# Patient Record
Sex: MALE | Race: WHITE | NOT HISPANIC OR LATINO | Employment: FULL TIME | URBAN - METROPOLITAN AREA
[De-identification: names, ages, dates, MRNs, and addresses within clinical notes are randomized per-mention and may not be internally consistent; named-entity substitution may affect disease eponyms.]

---

## 2017-01-03 RX ORDER — METOPROLOL SUCCINATE 25 MG/1
TABLET, EXTENDED RELEASE ORAL
Qty: 30 TABLET | Refills: 0 | Status: SHIPPED | OUTPATIENT
Start: 2017-01-03 | End: 2017-03-06 | Stop reason: SDUPTHER

## 2017-02-20 DIAGNOSIS — E55.9 VITAMIN D DEFICIENCY: ICD-10-CM

## 2017-02-20 RX ORDER — ERGOCALCIFEROL 1.25 MG/1
CAPSULE ORAL
Qty: 12 CAPSULE | Refills: 0 | OUTPATIENT
Start: 2017-02-20

## 2017-02-20 RX ORDER — FEBUXOSTAT 80 MG/1
TABLET ORAL
Qty: 30 TABLET | Refills: 0 | OUTPATIENT
Start: 2017-02-20

## 2017-02-20 RX ORDER — METOPROLOL SUCCINATE 25 MG/1
TABLET, EXTENDED RELEASE ORAL
Qty: 30 TABLET | Refills: 0 | OUTPATIENT
Start: 2017-02-20

## 2017-03-06 DIAGNOSIS — E78.5 HYPERLIPIDEMIA, UNSPECIFIED HYPERLIPIDEMIA TYPE: ICD-10-CM

## 2017-03-06 RX ORDER — METOPROLOL SUCCINATE 25 MG/1
25 TABLET, EXTENDED RELEASE ORAL DAILY
Qty: 30 TABLET | Refills: 0 | Status: SHIPPED | OUTPATIENT
Start: 2017-03-06 | End: 2017-03-10 | Stop reason: SDUPTHER

## 2017-03-06 RX ORDER — EZETIMIBE AND SIMVASTATIN 10; 80 MG/1; MG/1
1 TABLET ORAL NIGHTLY
Qty: 30 TABLET | Refills: 0 | Status: SHIPPED | OUTPATIENT
Start: 2017-03-06 | End: 2017-03-10 | Stop reason: SDUPTHER

## 2017-03-10 DIAGNOSIS — E55.9 VITAMIN D DEFICIENCY: ICD-10-CM

## 2017-03-10 DIAGNOSIS — E78.5 HYPERLIPIDEMIA, UNSPECIFIED HYPERLIPIDEMIA TYPE: ICD-10-CM

## 2017-03-10 RX ORDER — FEBUXOSTAT 80 MG/1
TABLET ORAL
Qty: 30 TABLET | Refills: 0 | Status: SHIPPED | OUTPATIENT
Start: 2017-03-10 | End: 2017-03-10 | Stop reason: SDUPTHER

## 2017-03-10 RX ORDER — ERGOCALCIFEROL 1.25 MG/1
CAPSULE ORAL
Qty: 12 CAPSULE | Refills: 0 | Status: SHIPPED | OUTPATIENT
Start: 2017-03-10 | End: 2017-06-07 | Stop reason: SDUPTHER

## 2017-03-10 RX ORDER — METOPROLOL SUCCINATE 25 MG/1
25 TABLET, EXTENDED RELEASE ORAL DAILY
Qty: 30 TABLET | Refills: 0 | Status: SHIPPED | OUTPATIENT
Start: 2017-03-10 | End: 2017-06-07 | Stop reason: SDUPTHER

## 2017-03-10 RX ORDER — CHOLECALCIFEROL (VITAMIN D3) 1250 MCG
CAPSULE ORAL
Qty: 12 CAPSULE | Refills: 0 | Status: SHIPPED | OUTPATIENT
Start: 2017-03-10 | End: 2019-02-17 | Stop reason: SDUPTHER

## 2017-03-10 RX ORDER — FEBUXOSTAT 80 MG/1
80 TABLET, FILM COATED ORAL DAILY
Qty: 30 TABLET | Refills: 0 | Status: SHIPPED | OUTPATIENT
Start: 2017-03-10 | End: 2017-04-20 | Stop reason: SDUPTHER

## 2017-03-10 RX ORDER — EZETIMIBE AND SIMVASTATIN 10; 80 MG/1; MG/1
1 TABLET ORAL NIGHTLY
Qty: 30 TABLET | Refills: 0 | Status: SHIPPED | OUTPATIENT
Start: 2017-03-10 | End: 2017-03-13

## 2017-03-13 ENCOUNTER — TELEPHONE (OUTPATIENT)
Dept: INTERNAL MEDICINE | Facility: CLINIC | Age: 47
End: 2017-03-13

## 2017-03-13 RX ORDER — SILDENAFIL CITRATE 100 MG
TABLET ORAL
Qty: 10 TABLET | Refills: 3 | OUTPATIENT
Start: 2017-03-13 | End: 2019-01-08 | Stop reason: SDUPTHER

## 2017-03-13 RX ORDER — EZETIMIBE AND SIMVASTATIN 10; 40 MG/1; MG/1
1 TABLET ORAL NIGHTLY
Qty: 90 TABLET | Refills: 1 | Status: SHIPPED | OUTPATIENT
Start: 2017-03-13 | End: 2017-06-08 | Stop reason: SDUPTHER

## 2017-03-13 NOTE — TELEPHONE ENCOUNTER
Pt insurance will not cover the Vytorin 10/80 1/2 tablet qd.  Pt only takes vytorin 10/40 anyway.  He has to pay whatever his part is of the medicine but it does not need PA

## 2017-03-14 ENCOUNTER — CONVERSION ENCOUNTER (OUTPATIENT)
Dept: INTERNAL MEDICINE | Facility: CLINIC | Age: 47
End: 2017-03-14

## 2017-03-17 LAB
CONV COMMENT: ABNORMAL
TESTOST SERPL-MCNC: 347 NG/DL (ref 348–1197)
TESTOSTERONE.FREE+WB MFR SERPL: 22.2 % (ref 9–46)
TESTOSTERONE.FREE+WB SERPL-MCNC: 77 NG/DL (ref 40–250)

## 2017-03-21 ENCOUNTER — OFFICE VISIT (OUTPATIENT)
Dept: INTERNAL MEDICINE | Facility: CLINIC | Age: 47
End: 2017-03-21

## 2017-03-21 VITALS
WEIGHT: 299 LBS | HEART RATE: 98 BPM | BODY MASS INDEX: 39.63 KG/M2 | SYSTOLIC BLOOD PRESSURE: 112 MMHG | DIASTOLIC BLOOD PRESSURE: 74 MMHG | HEIGHT: 73 IN | OXYGEN SATURATION: 96 %

## 2017-03-21 DIAGNOSIS — Z51.81 THERAPEUTIC DRUG MONITORING: ICD-10-CM

## 2017-03-21 DIAGNOSIS — G47.33 OBSTRUCTIVE SLEEP APNEA: Chronic | ICD-10-CM

## 2017-03-21 DIAGNOSIS — E55.9 VITAMIN D DEFICIENCY: Chronic | ICD-10-CM

## 2017-03-21 DIAGNOSIS — E79.0 ASYMPTOMATIC HYPERURICEMIA: Chronic | ICD-10-CM

## 2017-03-21 DIAGNOSIS — I10 BENIGN ESSENTIAL HYPERTENSION: Chronic | ICD-10-CM

## 2017-03-21 DIAGNOSIS — E29.1 HYPOGONADISM MALE: Primary | Chronic | ICD-10-CM

## 2017-03-21 DIAGNOSIS — R73.01 IMPAIRED FASTING GLUCOSE: Chronic | ICD-10-CM

## 2017-03-21 DIAGNOSIS — E78.5 HYPERLIPIDEMIA, UNSPECIFIED HYPERLIPIDEMIA TYPE: Chronic | ICD-10-CM

## 2017-03-21 DIAGNOSIS — Z23 NEED FOR INFLUENZA VACCINATION: ICD-10-CM

## 2017-03-21 DIAGNOSIS — Z00.00 ROUTINE PHYSICAL EXAMINATION: ICD-10-CM

## 2017-03-21 DIAGNOSIS — N52.9 MALE ERECTILE DISORDER: Chronic | ICD-10-CM

## 2017-03-21 DIAGNOSIS — Z23 NEED FOR TDAP VACCINATION: ICD-10-CM

## 2017-03-21 PROCEDURE — 99213 OFFICE O/P EST LOW 20 MIN: CPT | Performed by: INTERNAL MEDICINE

## 2017-03-21 PROCEDURE — 90656 IIV3 VACC NO PRSV 0.5 ML IM: CPT | Performed by: INTERNAL MEDICINE

## 2017-03-21 PROCEDURE — 90471 IMMUNIZATION ADMIN: CPT | Performed by: INTERNAL MEDICINE

## 2017-03-21 PROCEDURE — 90472 IMMUNIZATION ADMIN EACH ADD: CPT | Performed by: INTERNAL MEDICINE

## 2017-03-21 PROCEDURE — 90715 TDAP VACCINE 7 YRS/> IM: CPT | Performed by: INTERNAL MEDICINE

## 2017-03-21 NOTE — PROGRESS NOTES
03/21/2017    Patient Information  Victoriano Rowe                                                                                          [email or web address]  [facility] [address]      1970  [phone]    Chief Complaint:     Follow-up questionable hypogonadism, erectile dysfunction.  No new acute complaints.    History of Present Illness:    Patient with a history of allergic rhinitis/environmental allergies, hyperuricemia, hypertension, hyperlipidemia, hypogonadism, impaired fasting glucose, male erectile disorder, sleep apnea, history of nephrolithiasis.  He presents today to follow-up questionable hypogonadism and the history regarding this will be described in detail below.  Patient is currently feeling well and has no new acute complaints.  Past medical history extensively reviewed including health maintenance parameters and this reveals that patient needs his influenza vaccination and also needs a TDaP.    Review of Systems   Constitution: Negative.   HENT: Negative.    Eyes: Negative.    Cardiovascular: Negative.    Respiratory: Negative.    Endocrine: Negative.    Hematologic/Lymphatic: Negative.    Skin: Negative.    Musculoskeletal: Negative.    Gastrointestinal: Negative.    Genitourinary: Negative.    Neurological: Negative.    Psychiatric/Behavioral: Negative.    Allergic/Immunologic: Negative.        Active Problems:    Patient Active Problem List   Diagnosis   • Allergic rhinitis   • Asymptomatic hyperuricemia   • Benign essential hypertension   • Chronic lower back pain   • Hyperlipidemia   • Borderline Hypogonadism male   • Impaired fasting glucose   • Male erectile disorder   • Morbid obesity   • Obstructive sleep apnea, 2008--tolerates CPAP well.   • Vitamin D deficiency   • History of nephrolithiasis   • Therapeutic drug monitoring   • Routine physical examination   • Multiple environmental allergies         Past Medical History   Diagnosis Date   • History  of cardiovascular stress test 02/15/2014     02/15/2014--Patient subsequently discontinued caffeine from his diet and all of his symptoms have resolved. 07/27/2012--patient was seen in evaluation by the cardiologist. He was complaining of some dizziness and his metoprolol was decreased from 25 mg per day to 12.5 mg per day which resulted in improvement of his symptoms. He was continuing to experience chronic chest discomfort as well as palp   • History of chest pain 10/03/2014     10/03/2014--Patient subsequently discontinued caffeine from his diet and all of his symptoms have resolved.   07/27/2012--patient was seen in evaluation by the cardiologist. He was complaining of some dizziness and his metoprolol was decreased from 25 mg per day to 12.5 mg per day which resulted in improvement of his symptoms. He was continuing to experience chronic chest discomfort as well as pa   • History of echocardiogram 10/01/2008     10/01/2008--echocardiogram reveals left ventricular chamber dimensions normal with mild concentric hypertrophy. Wall motion appears normal. Left atrium mildly dilated. Right ventricle not well seen but appears to contract normally. Right atrium is normal size by visual estimate. The mitral valve leaflets are mobile. The inflow pattern suggests abnormal relaxation. No significant regurgitation appr   • History of gastroesophageal reflux (GERD) 12/28/2011 12/28/2011--treatment for gastroesophageal reflux begun with Nexium. This was later discontinued and patient remains asymptomatic.   • History of Holter monitoring 07/27/2012 07/27/2012--Holter monitor revealed 10 isolated PVCs without couplets or episodes of ventricular tachycardia. There were rare PACs. No heart block. Heart palpitations were noted by the patient on 3 occasions. No arrhythmia was noted at any of these times. There were no ST segment changes.   03/04/2012--Holter monitor revealed rare ventricular ectopy without evidence of  ventricular tachycardia. Rar   • History of nephrolithiasis 7/19/2011 08/25/2011-- allopurinol initiated for hyperuricemia.  07/19/2011--patient presented with right flank pain with radiation into the right back. CT scan of the abdomen revealed a 4 mm stone in the dependent aspect of the urinary bladder.  Likely recent passage from the right ureter where there is mild right hydronephrosis.  1.6 cm nonobstructing stone at the lower pole of the left kidney and a punctate nonobstructing stone at the upper pole of the left kidney.  Patient passed the stone spontaneously.  Calcium oxalate.   • History of orthostatic hypotension 1/25/2016 03/16/2016--patient seen in follow-up and reports his orthostatic symptoms have totally resolved off of Benicar.  Blood pressure is excellent at 120/80.  02/16/2016--patient seen in follow-up and feels much better.  Blood pressure is still somewhat low at 104/64.  I personally checked his blood pressure and it returned 110/69.  Benicar discontinued altogether.  We will give it about one month to wash out and then reassess.  01/25/2016--patient has lost 60 pounds over the past year and he reports that he gets lightheaded when he stands up from time to time.  Indeed his blood pressure is low at 100/60.  Benicar HCT 40/25 reduced to a half a pill per day.  Reassess in 3 weeks.   • History of palpitations 02/15/2014     02/15/2014--Patient subsequently discontinued caffeine from his diet and all of his symptoms have resolved.   07/27/2012--patient was seen in evaluation by the cardiologist. He was complaining of some dizziness and his metoprolol was decreased from 25 mg per day to 12.5 mg per day which resulted in improvement of his symptoms. He was continuing to experience chronic chest discomfort as well as pa         Past Surgical History   Procedure Laterality Date   • Kidney surgery Left      Age [age].  Patient had some sort of swelling of his left kidney, possibly a cyst.   Surgical resection.  No further problems.         Allergies   Allergen Reactions   • Penicillins            Current Outpatient Prescriptions:   •  aspirin 81 MG tablet, Take 1 tablet by mouth daily., Disp: , Rfl:   •  Cholecalciferol (VITAMIN D3) 82034 UNITS capsule, Take 1 by mouth 3 times a week on Monday Wednesday and Friday., Disp: 12 capsule, Rfl: 0  •  ezetimibe-simvastatin (VYTORIN) 10-40 MG per tablet, Take 1 tablet by mouth Every Night., Disp: 90 tablet, Rfl: 1  •  febuxostat (ULORIC) 80 MG tablet tablet, Take 1 tablet by mouth Daily., Disp: 30 tablet, Rfl: 0  •  metoprolol succinate XL (TOPROL-XL) 25 MG 24 hr tablet, Take 1 tablet by mouth Daily., Disp: 30 tablet, Rfl: 0  •  Multiple Vitamins tablet, Take 1 tablet by mouth daily., Disp: , Rfl:   •  sildenafil (VIAGRA) 100 MG tablet, Take 1 tablet by mouth as needed. TAKE APPROXIMATELY ONE HOUR BEFORE NEEDED, Disp: , Rfl:   •  sildenafil (VIAGRA) 100 MG tablet, Take 1 tablet (100 mg total) by mouth daily as needed for erectile dysfunction, Disp: 40 tablet, Rfl: 3  •  tadalafil (CIALIS) 5 MG tablet, Take 1 tablet (5 mg total) by mouth daily as needed for erectile dysfunction, Disp: 30 tablet, Rfl: 1  •  VIAGRA 100 MG tablet, TAKE ONE TABLET BY MOUTH APPROXIMATELY ONE HOUR BEFORE NEEDED, Disp: 10 tablet, Rfl: 3  •  vitamin D (ERGOCALCIFEROL) 82072 UNITS capsule capsule, TAKE ONE CAPSULE BY MOUTH THREE TIMES A WEEK ON MONDAY, WEDNESDAY AND FRIDAY, Disp: 12 capsule, Rfl: 0      Family History   Problem Relation Age of Onset   • Alcohol abuse Father    • Heart failure Father    • Emphysema Father    • Diabetes Father    • Diabetes Sister          Social History     Social History   • Marital status:      Spouse name: N/A   • Number of children: N/A   • Years of education: N/A     Occupational History   •  for an [employer]      Social History Main Topics   • Smoking status: Never Smoker   • Smokeless tobacco: Never Used   • Alcohol use Yes       "Comment: Social alcohol consumption   • Drug use: No   • Sexual activity: Yes     Partners: Female     Other Topics Concern   • Not on file     Social History Narrative         Vitals:    03/21/17 1440   BP: 112/74   Pulse: 98   SpO2: 96%   Weight: 299 lb (136 kg)   Height: 73\" (185.4 cm)          Physical Exam:    General: Alert and oriented x 3.  No acute distress. Obese. Normal affect.  HEENT: Pupils equal, round, reactive to light; extraocular movements intact; sclerae nonicteric; pharynx, ear canals and TMs normal.  Neck: Without JVD, thyromegaly, bruit, or adenopathy.  Lungs: Clear to auscultation in all fields.  Heart: Regular rate and rhythm without murmur, rub, gallop, or click.  Abdomen: Soft, nontender, without hepatosplenomegaly or hernia.  Bowel sounds normal.  : Deferred.  Rectal: Deferred.  Extremities: Without clubbing, cyanosis, edema, or pulse deficit.  Neurologic: Intact without focal deficit.  Normal station and gait observed during ingress and egress from the examination room.  Skin: Without significant lesion.  Musculoskeletal: Unremarkable.      Lab/other results:    Total testosterone is borderline low at 347.  Free and weakly bound testosterone normal at 77.0.    Assessment/Plan:     Diagnosis Plan   1. Borderline Hypogonadism male     2. Benign essential hypertension     3. Hyperlipidemia, unspecified hyperlipidemia type     4. Impaired fasting glucose     5. Male erectile disorder     6. Obstructive sleep apnea, 2008--tolerates CPAP well.     7. Vitamin D deficiency     8. Therapeutic drug monitoring     9. Asymptomatic hyperuricemia     10. Routine physical examination         Patient has borderline hypogonadism and TRT is not indicated.  He has hypertension and it appears to be in good control.  Also note that several diagnoses were added to the assessment and plan that were not formally evaluated today.  This was done to facilitate ordering his lab work for his physical in the near " future.    Plan is as follows: No change in current medical regimen.  TDaP and influenza vaccinations given today.  Patient will follow-up after 06/30/2017 for his annual exam with lab prior.          Procedures

## 2017-03-22 ENCOUNTER — RESULTS ENCOUNTER (OUTPATIENT)
Dept: INTERNAL MEDICINE | Facility: CLINIC | Age: 47
End: 2017-03-22

## 2017-03-22 DIAGNOSIS — E78.5 HYPERLIPIDEMIA, UNSPECIFIED HYPERLIPIDEMIA TYPE: Chronic | ICD-10-CM

## 2017-03-22 DIAGNOSIS — R73.01 IMPAIRED FASTING GLUCOSE: Chronic | ICD-10-CM

## 2017-03-22 DIAGNOSIS — E55.9 VITAMIN D DEFICIENCY: Chronic | ICD-10-CM

## 2017-03-22 DIAGNOSIS — Z00.00 ROUTINE PHYSICAL EXAMINATION: ICD-10-CM

## 2017-03-22 DIAGNOSIS — E79.0 ASYMPTOMATIC HYPERURICEMIA: Chronic | ICD-10-CM

## 2017-03-22 DIAGNOSIS — E29.1 HYPOGONADISM MALE: Chronic | ICD-10-CM

## 2017-04-20 RX ORDER — FEBUXOSTAT 80 MG/1
TABLET ORAL
Qty: 30 TABLET | Refills: 1 | Status: SHIPPED | OUTPATIENT
Start: 2017-04-20 | End: 2017-07-05 | Stop reason: SDUPTHER

## 2017-06-07 DIAGNOSIS — E55.9 VITAMIN D DEFICIENCY: ICD-10-CM

## 2017-06-07 RX ORDER — METOPROLOL SUCCINATE 25 MG/1
TABLET, EXTENDED RELEASE ORAL
Qty: 30 TABLET | Refills: 0 | Status: SHIPPED | OUTPATIENT
Start: 2017-06-07 | End: 2017-07-05 | Stop reason: SDUPTHER

## 2017-06-07 RX ORDER — ERGOCALCIFEROL 1.25 MG/1
CAPSULE ORAL
Qty: 12 CAPSULE | Refills: 0 | Status: SHIPPED | OUTPATIENT
Start: 2017-06-07 | End: 2017-07-18 | Stop reason: SDUPTHER

## 2017-06-08 RX ORDER — EZETIMIBE AND SIMVASTATIN 10; 40 MG/1; MG/1
1 TABLET ORAL NIGHTLY
Qty: 90 TABLET | Refills: 3 | Status: SHIPPED | OUTPATIENT
Start: 2017-06-08 | End: 2018-02-01 | Stop reason: SDUPTHER

## 2017-07-05 DIAGNOSIS — E55.9 VITAMIN D DEFICIENCY: ICD-10-CM

## 2017-07-05 RX ORDER — METOPROLOL SUCCINATE 25 MG/1
TABLET, EXTENDED RELEASE ORAL
Qty: 30 TABLET | Refills: 0 | Status: SHIPPED | OUTPATIENT
Start: 2017-07-05 | End: 2017-08-10 | Stop reason: SDUPTHER

## 2017-07-05 RX ORDER — FEBUXOSTAT 80 MG/1
TABLET ORAL
Qty: 30 TABLET | Refills: 0 | Status: SHIPPED | OUTPATIENT
Start: 2017-07-05 | End: 2017-08-10 | Stop reason: SDUPTHER

## 2017-07-06 RX ORDER — ERGOCALCIFEROL 1.25 MG/1
CAPSULE ORAL
Qty: 12 CAPSULE | Refills: 0 | OUTPATIENT
Start: 2017-07-06

## 2017-07-18 DIAGNOSIS — E55.9 VITAMIN D DEFICIENCY: ICD-10-CM

## 2017-07-19 RX ORDER — ERGOCALCIFEROL 1.25 MG/1
CAPSULE ORAL
Qty: 12 CAPSULE | Refills: 0 | Status: SHIPPED | OUTPATIENT
Start: 2017-07-19 | End: 2017-08-10 | Stop reason: SDUPTHER

## 2017-07-25 LAB
25(OH)D3+25(OH)D2 SERPL-MCNC: 39.5 NG/ML (ref 30–100)
ALBUMIN SERPL-MCNC: 4.6 G/DL (ref 3.5–5.2)
ALBUMIN/GLOB SERPL: 1.5 G/DL
ALP SERPL-CCNC: 58 U/L (ref 39–117)
ALT SERPL-CCNC: 97 U/L (ref 1–41)
APPEARANCE UR: CLEAR
AST SERPL-CCNC: 52 U/L (ref 1–40)
BILIRUB SERPL-MCNC: 0.5 MG/DL (ref 0.1–1.2)
BILIRUB UR QL STRIP: NEGATIVE
BUN SERPL-MCNC: 15 MG/DL (ref 6–20)
BUN/CREAT SERPL: 16.1 (ref 7–25)
CALCIUM SERPL-MCNC: 9.7 MG/DL (ref 8.6–10.5)
CHLORIDE SERPL-SCNC: 104 MMOL/L (ref 98–107)
CHOLEST SERPL-MCNC: 92 MG/DL (ref 100–199)
CK SERPL-CCNC: 125 U/L (ref 20–200)
CO2 SERPL-SCNC: 27.5 MMOL/L (ref 22–29)
COLOR UR: YELLOW
CREAT SERPL-MCNC: 0.93 MG/DL (ref 0.76–1.27)
ERYTHROCYTE [DISTWIDTH] IN BLOOD BY AUTOMATED COUNT: 13.8 % (ref 11.5–14.5)
GLOBULIN SER CALC-MCNC: 3.1 GM/DL
GLUCOSE SERPL-MCNC: 100 MG/DL (ref 65–99)
GLUCOSE UR QL: NEGATIVE
HBA1C MFR BLD: 5.2 % (ref 4.8–5.6)
HCT VFR BLD AUTO: 45.7 % (ref 40.4–52.2)
HDL SERPL-SCNC: 33.7 UMOL/L
HDLC SERPL-MCNC: 36 MG/DL
HGB BLD-MCNC: 14.4 G/DL (ref 13.7–17.6)
HGB UR QL STRIP: NEGATIVE
KETONES UR QL STRIP: NEGATIVE
LDL SERPL QN: 19.7 NM
LDL SERPL-SCNC: 464 NMOL/L
LDL SMALL SERPL-SCNC: 364 NMOL/L
LDLC SERPL CALC-MCNC: 30 MG/DL (ref 0–99)
LEUKOCYTE ESTERASE UR QL STRIP: NEGATIVE
MCH RBC QN AUTO: 29.1 PG (ref 27–32.7)
MCHC RBC AUTO-ENTMCNC: 31.5 G/DL (ref 32.6–36.4)
MCV RBC AUTO: 92.3 FL (ref 79.8–96.2)
NITRITE UR QL STRIP: NEGATIVE
PH UR STRIP: 5.5 [PH] (ref 5–8)
PLATELET # BLD AUTO: 168 10*3/MM3 (ref 140–500)
POTASSIUM SERPL-SCNC: 4.8 MMOL/L (ref 3.5–5.2)
PROT SERPL-MCNC: 7.7 G/DL (ref 6–8.5)
PROT UR QL STRIP: ABNORMAL
PSA SERPL-MCNC: 0.42 NG/ML (ref 0–4)
RBC # BLD AUTO: 4.95 10*6/MM3 (ref 4.6–6)
SODIUM SERPL-SCNC: 144 MMOL/L (ref 136–145)
SP GR UR: 1.02 (ref 1–1.03)
T3FREE SERPL-MCNC: 3.2 PG/ML (ref 2–4.4)
T4 FREE SERPL-MCNC: 1.26 NG/DL (ref 0.93–1.7)
TESTOST SERPL-MCNC: 292 NG/DL (ref 264–916)
TESTOSTERONE.FREE+WB MFR SERPL: 26.9 % (ref 9–46)
TESTOSTERONE.FREE+WB SERPL-MCNC: 78.5 NG/DL (ref 40–250)
TRIGL SERPL-MCNC: 128 MG/DL (ref 0–149)
TSH SERPL DL<=0.005 MIU/L-ACNC: 3.96 MIU/ML (ref 0.27–4.2)
URATE SERPL-MCNC: 4.6 MG/DL (ref 3.4–7)
UROBILINOGEN UR STRIP-MCNC: ABNORMAL MG/DL
WBC # BLD AUTO: 5.1 10*3/MM3 (ref 4.5–10.7)

## 2017-07-27 ENCOUNTER — OFFICE VISIT (OUTPATIENT)
Dept: INTERNAL MEDICINE | Facility: CLINIC | Age: 47
End: 2017-07-27

## 2017-07-27 VITALS
SYSTOLIC BLOOD PRESSURE: 116 MMHG | BODY MASS INDEX: 41.07 KG/M2 | OXYGEN SATURATION: 93 % | HEIGHT: 73 IN | DIASTOLIC BLOOD PRESSURE: 64 MMHG | HEART RATE: 108 BPM | WEIGHT: 309.9 LBS

## 2017-07-27 DIAGNOSIS — E79.0 ASYMPTOMATIC HYPERURICEMIA: Chronic | ICD-10-CM

## 2017-07-27 DIAGNOSIS — E29.1 HYPOGONADISM MALE: Chronic | ICD-10-CM

## 2017-07-27 DIAGNOSIS — M54.5 CHRONIC LOW BACK PAIN, UNSPECIFIED BACK PAIN LATERALITY, WITH SCIATICA PRESENCE UNSPECIFIED: Chronic | ICD-10-CM

## 2017-07-27 DIAGNOSIS — I10 BENIGN ESSENTIAL HYPERTENSION: Chronic | ICD-10-CM

## 2017-07-27 DIAGNOSIS — E55.9 VITAMIN D DEFICIENCY: Chronic | ICD-10-CM

## 2017-07-27 DIAGNOSIS — G47.33 OBSTRUCTIVE SLEEP APNEA: Chronic | ICD-10-CM

## 2017-07-27 DIAGNOSIS — Z51.81 THERAPEUTIC DRUG MONITORING: ICD-10-CM

## 2017-07-27 DIAGNOSIS — R73.01 IMPAIRED FASTING GLUCOSE: Chronic | ICD-10-CM

## 2017-07-27 DIAGNOSIS — E78.5 HYPERLIPIDEMIA, UNSPECIFIED HYPERLIPIDEMIA TYPE: Chronic | ICD-10-CM

## 2017-07-27 DIAGNOSIS — R74.8 ELEVATED LIVER ENZYMES: ICD-10-CM

## 2017-07-27 DIAGNOSIS — Z87.442 HISTORY OF NEPHROLITHIASIS: Chronic | ICD-10-CM

## 2017-07-27 DIAGNOSIS — G89.29 CHRONIC LOW BACK PAIN, UNSPECIFIED BACK PAIN LATERALITY, WITH SCIATICA PRESENCE UNSPECIFIED: Chronic | ICD-10-CM

## 2017-07-27 DIAGNOSIS — Z00.00 ROUTINE PHYSICAL EXAMINATION: Primary | ICD-10-CM

## 2017-07-27 DIAGNOSIS — Z91.09 MULTIPLE ENVIRONMENTAL ALLERGIES: Chronic | ICD-10-CM

## 2017-07-27 PROCEDURE — 99396 PREV VISIT EST AGE 40-64: CPT | Performed by: INTERNAL MEDICINE

## 2017-07-27 PROCEDURE — 99213 OFFICE O/P EST LOW 20 MIN: CPT | Performed by: INTERNAL MEDICINE

## 2017-07-27 NOTE — PROGRESS NOTES
07/27/2017    Patient Information  Victoriano Rowe                                                                                          [id number]  [facility] [address]      1970  [phone]    Chief Complaint:     Routine physical examination and follow-up lab work.  No new acute complaints.    History of Present Illness:    Patient with a history of impaired fasting glucose, hyperlipidemia, borderline hypogonadism, hypertension, asymptomatic hyperuricemia and history of kidney stones, sleep apnea, environmental allergies, vitamin D deficiency, chronic lower back pain.  He presents today for a routine physical with lab prior in order to monitor his chronic medical issues.  He is tolerating his medications well and has no new acute complaints.  Past medical history reviewed and updated where necessary including health maintenance parameters.  This reveals he is up-to-date.    Review of Systems   Constitution: Negative.   HENT: Negative.    Eyes: Negative.    Cardiovascular: Negative.    Respiratory: Negative.    Endocrine: Negative.    Hematologic/Lymphatic: Negative.    Skin: Negative.    Musculoskeletal: Negative.    Gastrointestinal: Negative.    Genitourinary: Negative.    Neurological: Negative.    Psychiatric/Behavioral: Negative.    Allergic/Immunologic: Negative.        Active Problems:    Patient Active Problem List   Diagnosis   • Allergic rhinitis   • Asymptomatic hyperuricemia   • Benign essential hypertension   • Chronic lower back pain   • Hyperlipidemia   • Borderline Hypogonadism male   • Impaired fasting glucose   • Male erectile disorder   • Morbid obesity   • Obstructive sleep apnea, 2008--tolerates CPAP well.   • Vitamin D deficiency   • History of nephrolithiasis   • Therapeutic drug monitoring   • Routine physical examination   • Multiple environmental allergies   • Elevated liver enzymes         Past Medical History:   Diagnosis Date   • History  of cardiovascular stress test 02/15/2014    02/15/2014--Patient subsequently discontinued caffeine from his diet and all of his symptoms have resolved. 07/27/2012--patient was seen in evaluation by the cardiologist. He was complaining of some dizziness and his metoprolol was decreased from 25 mg per day to 12.5 mg per day which resulted in improvement of his symptoms. He was continuing to experience chronic chest discomfort as well as palp   • History of chest pain 10/03/2014    10/03/2014--Patient subsequently discontinued caffeine from his diet and all of his symptoms have resolved.   07/27/2012--patient was seen in evaluation by the cardiologist. He was complaining of some dizziness and his metoprolol was decreased from 25 mg per day to 12.5 mg per day which resulted in improvement of his symptoms. He was continuing to experience chronic chest discomfort as well as pa   • History of echocardiogram 10/01/2008    10/01/2008--echocardiogram reveals left ventricular chamber dimensions normal with mild concentric hypertrophy. Wall motion appears normal. Left atrium mildly dilated. Right ventricle not well seen but appears to contract normally. Right atrium is normal size by visual estimate. The mitral valve leaflets are mobile. The inflow pattern suggests abnormal relaxation. No significant regurgitation appr   • History of gastroesophageal reflux (GERD) 12/28/2011 12/28/2011--treatment for gastroesophageal reflux begun with Nexium. This was later discontinued and patient remains asymptomatic.   • History of Holter monitoring 07/27/2012 07/27/2012--Holter monitor revealed 10 isolated PVCs without couplets or episodes of ventricular tachycardia. There were rare PACs. No heart block. Heart palpitations were noted by the patient on 3 occasions. No arrhythmia was noted at any of these times. There were no ST segment changes.   03/04/2012--Holter monitor revealed rare ventricular ectopy without evidence of  ventricular tachycardia. Rar   • History of nephrolithiasis 7/19/2011 08/25/2011-- allopurinol initiated for hyperuricemia.  07/19/2011--patient presented with right flank pain with radiation into the right back. CT scan of the abdomen revealed a 4 mm stone in the dependent aspect of the urinary bladder.  Likely recent passage from the right ureter where there is mild right hydronephrosis.  1.6 cm nonobstructing stone at the lower pole of the left kidney and a punctate nonobstructing stone at the upper pole of the left kidney.  Patient passed the stone spontaneously.  Calcium oxalate.   • History of orthostatic hypotension 1/25/2016 03/16/2016--patient seen in follow-up and reports his orthostatic symptoms have totally resolved off of Benicar.  Blood pressure is excellent at 120/80.  02/16/2016--patient seen in follow-up and feels much better.  Blood pressure is still somewhat low at 104/64.  I personally checked his blood pressure and it returned 110/69.  Benicar discontinued altogether.  We will give it about one month to wash out and then reassess.  01/25/2016--patient has lost 60 pounds over the past year and he reports that he gets lightheaded when he stands up from time to time.  Indeed his blood pressure is low at 100/60.  Benicar HCT 40/25 reduced to a half a pill per day.  Reassess in 3 weeks.   • History of palpitations 02/15/2014    02/15/2014--Patient subsequently discontinued caffeine from his diet and all of his symptoms have resolved.   07/27/2012--patient was seen in evaluation by the cardiologist. He was complaining of some dizziness and his metoprolol was decreased from 25 mg per day to 12.5 mg per day which resulted in improvement of his symptoms. He was continuing to experience chronic chest discomfort as well as pa         Past Surgical History:   Procedure Laterality Date   • KIDNEY SURGERY Left     Age [age].  Patient had some sort of swelling of his left kidney, possibly a cyst.   Surgical resection.  No further problems.         Allergies   Allergen Reactions   • Penicillins            Current Outpatient Prescriptions:   •  aspirin 81 MG tablet, Take 1 tablet by mouth daily., Disp: , Rfl:   •  Cholecalciferol (VITAMIN D3) 66552 UNITS capsule, Take 1 by mouth 3 times a week on Monday Wednesday and Friday., Disp: 12 capsule, Rfl: 0  •  ezetimibe-simvastatin (VYTORIN) 10-40 MG per tablet, Take 1 tablet by mouth Every Night., Disp: 90 tablet, Rfl: 3  •  metoprolol succinate XL (TOPROL-XL) 25 MG 24 hr tablet, TAKE ONE TABLET BY MOUTH ONCE DAILY, Disp: 30 tablet, Rfl: 0  •  Multiple Vitamins tablet, Take 1 tablet by mouth daily., Disp: , Rfl:   •  sildenafil (VIAGRA) 100 MG tablet, Take 1 tablet (100 mg total) by mouth daily as needed for erectile dysfunction, Disp: 40 tablet, Rfl: 3  •  ULORIC 80 MG tablet tablet, TAKE ONE TABLET BY MOUTH ONCE DAILY, Disp: 30 tablet, Rfl: 0  •  tadalafil (CIALIS) 5 MG tablet, Take 1 tablet (5 mg total) by mouth daily as needed for erectile dysfunction, Disp: 30 tablet, Rfl: 1  •  VIAGRA 100 MG tablet, TAKE ONE TABLET BY MOUTH APPROXIMATELY ONE HOUR BEFORE NEEDED, Disp: 10 tablet, Rfl: 3  •  vitamin D (ERGOCALCIFEROL) 27939 UNITS capsule capsule, TAKE ONE CAPSULE BY MOUTH THREE TIMES A WEEK ON MONDAY, WEDNESDAY AND FRIDAY, Disp: 12 capsule, Rfl: 0      Family History   Problem Relation Age of Onset   • Alcohol abuse Father    • Heart failure Father    • Emphysema Father    • Diabetes Father    • Diabetes Sister          Social History     Social History   • Marital status:      Spouse name: N/A   • Number of children: N/A   • Years of education: N/A     Occupational History   •  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      Social History Main Topics   • Smoking status: Never Smoker   • Smokeless tobacco: Never Used   • Alcohol use Yes      Comment: Social alcohol consumption   • Drug use: No   • Sexual activity: Yes     Partners: Female     Other Topics Concern   •  "Not on file     Social History Narrative         Vitals:    07/27/17 0739   BP: 116/64   BP Location: Right arm   Patient Position: Sitting   Cuff Size: Large Adult   Pulse: 108   SpO2: 93%   Weight: (!) 309 lb 14.4 oz (141 kg)   Height: 73\" (185.4 cm)          Physical Exam:    General: Alert and oriented x 3, with appropriate affect; no acute distress.  HEENT: pupils equal, round, and reactive to light; extraocular movements intact; sclera nonicteric; nasal mucosa normal; pharynx normal; tympanic membranes and ear canals normal.  Neck: without JVD, thyromegaly, bruit, or adenopathy.  Lungs: clear to auscultation in all fields.  Heart: auscultation reveals regular rate and rhythm without murmur, rub, gallop, or click.  Abdomen: is soft and nontender, without hepato-splenomegaly, mass or hernia. Normal bowel sounds; .  Urologic exam: reveals normal male genitalia without testicular mass or penile/scrotal lesion.  Digital rectal exam and Prostate: deferred.  Extremities: are without clubbing, cyanosis, or edema.  Vascular: no signs of peripheral arterial disease or venous insufficiency/varicosities.  Neurological: intact without focal deficit, including cranial and peripheral nerves.  Station and gait observed to be normal during ingress and egress from the examination area.  Sensation and deep tendon reflexes tested if clinically indicated and are normal.  Musculoskeletal: exam is normal, without signs of synovitis, significant degeneration or deformity. Skin examination: without rash or significant lesions.      Lab/other results:    NMR reveals total cholesterol 92.  Triglycerides 128.  LDL particle number excellent at 464.  Small LDL particle number excellent at 364.  HDL particle number normal at 33.7.  CMP normal except blood sugar elevated at 100, AST elevated at 52, ALT elevated at 97.  Urinalysis normal.  CBC normal.  Total testosterone normal at 292.  Free and weakly bound testosterone normal at 78.5.  " Hemoglobin A1c 5.2.  Thyroid function tests normal.  PSA normal at 0.417.  Vitamin D normal.  Uric acid normal at 4.6.  CPK normal.    Assessment/Plan:     Diagnosis Plan   1. Routine physical examination     2. Impaired fasting glucose     3. Hyperlipidemia, unspecified hyperlipidemia type     4. Borderline Hypogonadism male     5. Benign essential hypertension     6. Asymptomatic hyperuricemia     7. Obstructive sleep apnea, 2008--tolerates CPAP well.     8. Multiple environmental allergies     9. History of nephrolithiasis     10. Vitamin D deficiency     11. Therapeutic drug monitoring     12. Chronic low back pain, unspecified back pain laterality, with sciatica presence unspecified     13. Elevated liver enzymes         Patient presents with essentially normal physical except for the following issues: He has very mild impaired fasting glucose it does not require medication.  Hyperlipidemia is under excellent control.  His testosterone levels are now not borderline given the fact that they recently lowered the reference range.  Uric acid levels are in the normal range which is important given his nephrolithiasis.  He has not had recurrence of kidney stones in some time now.  He has sleep apnea and tolerate CPAP well.  Environmental allergies are currently not much of an issue.  Vitamin D is therapeutic.  This back pain is much better as well.  Patient is a new elevation of liver enzymes that needs further evaluation.    Plan is as follows: Check hepatitis C antibody screening, hepatitis B surface antigen, repeat CMP.  Ultrasound liver ordered.  I'll have patient follow-up on the phone for the results unless there is a reason we need to make face-to-face.  The results of the studies will also determine if we will see him in one year for his annual exam or check him in 6 months with lab prior.          Procedures

## 2017-07-28 LAB
ALBUMIN SERPL-MCNC: 4.7 G/DL (ref 3.5–5.2)
ALBUMIN/GLOB SERPL: 1.6 G/DL
ALP SERPL-CCNC: 62 U/L (ref 39–117)
ALT SERPL-CCNC: 49 U/L (ref 1–41)
AST SERPL-CCNC: 25 U/L (ref 1–40)
BILIRUB SERPL-MCNC: 0.6 MG/DL (ref 0.1–1.2)
BUN SERPL-MCNC: 16 MG/DL (ref 6–20)
BUN/CREAT SERPL: 17.2 (ref 7–25)
CALCIUM SERPL-MCNC: 10 MG/DL (ref 8.6–10.5)
CHLORIDE SERPL-SCNC: 103 MMOL/L (ref 98–107)
CO2 SERPL-SCNC: 27.3 MMOL/L (ref 22–29)
CREAT SERPL-MCNC: 0.93 MG/DL (ref 0.76–1.27)
GLOBULIN SER CALC-MCNC: 3 GM/DL
GLUCOSE SERPL-MCNC: 109 MG/DL (ref 65–99)
HBV SURFACE AG SERPL QL IA: NEGATIVE
HCV AB S/CO SERPL IA: 0.1 S/CO RATIO (ref 0–0.9)
POTASSIUM SERPL-SCNC: 4.6 MMOL/L (ref 3.5–5.2)
PROT SERPL-MCNC: 7.7 G/DL (ref 6–8.5)
SODIUM SERPL-SCNC: 144 MMOL/L (ref 136–145)

## 2017-08-06 ENCOUNTER — HOSPITAL ENCOUNTER (OUTPATIENT)
Dept: ULTRASOUND IMAGING | Facility: HOSPITAL | Age: 47
Discharge: HOME | End: 2017-08-06
Admitting: INTERNAL MEDICINE

## 2017-08-06 DIAGNOSIS — R74.8 ELEVATED LIVER ENZYMES: ICD-10-CM

## 2017-08-06 PROCEDURE — 76705 ECHO EXAM OF ABDOMEN: CPT

## 2017-08-10 DIAGNOSIS — E55.9 VITAMIN D DEFICIENCY: ICD-10-CM

## 2017-08-11 RX ORDER — METOPROLOL SUCCINATE 25 MG/1
TABLET, EXTENDED RELEASE ORAL
Qty: 30 TABLET | Refills: 5 | Status: SHIPPED | OUTPATIENT
Start: 2017-08-11 | End: 2018-02-09 | Stop reason: SDUPTHER

## 2017-08-11 RX ORDER — FEBUXOSTAT 80 MG/1
TABLET ORAL
Qty: 30 TABLET | Refills: 5 | Status: SHIPPED | OUTPATIENT
Start: 2017-08-11 | End: 2018-04-23 | Stop reason: SDUPTHER

## 2017-08-11 RX ORDER — ERGOCALCIFEROL 1.25 MG/1
CAPSULE ORAL
Qty: 12 CAPSULE | Refills: 5 | Status: SHIPPED | OUTPATIENT
Start: 2017-08-11 | End: 2018-02-09 | Stop reason: SDUPTHER

## 2018-02-01 RX ORDER — EZETIMIBE AND SIMVASTATIN 10; 40 MG/1; MG/1
1 TABLET ORAL NIGHTLY
Qty: 90 TABLET | Refills: 1 | Status: SHIPPED | OUTPATIENT
Start: 2018-02-01 | End: 2018-07-24 | Stop reason: SDUPTHER

## 2018-02-09 DIAGNOSIS — E55.9 VITAMIN D DEFICIENCY: ICD-10-CM

## 2018-02-09 RX ORDER — ERGOCALCIFEROL 1.25 MG/1
50000 CAPSULE ORAL 3 TIMES WEEKLY
Qty: 36 CAPSULE | Refills: 1 | Status: SHIPPED | OUTPATIENT
Start: 2018-02-09 | End: 2018-02-13 | Stop reason: SDUPTHER

## 2018-02-09 RX ORDER — METOPROLOL SUCCINATE 25 MG/1
25 TABLET, EXTENDED RELEASE ORAL DAILY
Qty: 90 TABLET | Refills: 1 | Status: SHIPPED | OUTPATIENT
Start: 2018-02-09 | End: 2018-02-13 | Stop reason: SDUPTHER

## 2018-02-13 DIAGNOSIS — E55.9 VITAMIN D DEFICIENCY: ICD-10-CM

## 2018-02-13 RX ORDER — ERGOCALCIFEROL 1.25 MG/1
50000 CAPSULE ORAL 3 TIMES WEEKLY
Qty: 36 CAPSULE | Refills: 1 | Status: SHIPPED | OUTPATIENT
Start: 2018-02-14 | End: 2018-04-26 | Stop reason: SDUPTHER

## 2018-02-13 RX ORDER — METOPROLOL SUCCINATE 25 MG/1
25 TABLET, EXTENDED RELEASE ORAL DAILY
Qty: 90 TABLET | Refills: 1 | Status: SHIPPED | OUTPATIENT
Start: 2018-02-13 | End: 2019-05-13 | Stop reason: SDUPTHER

## 2018-02-15 ENCOUNTER — TELEPHONE (OUTPATIENT)
Dept: INTERNAL MEDICINE | Facility: CLINIC | Age: 48
End: 2018-02-15

## 2018-04-23 RX ORDER — FEBUXOSTAT 80 MG/1
TABLET ORAL
Qty: 30 TABLET | Refills: 3 | Status: SHIPPED | OUTPATIENT
Start: 2018-04-23 | End: 2018-11-20 | Stop reason: SDUPTHER

## 2018-04-26 DIAGNOSIS — E55.9 VITAMIN D DEFICIENCY: ICD-10-CM

## 2018-04-26 RX ORDER — ERGOCALCIFEROL 1.25 MG/1
CAPSULE ORAL
Qty: 24 CAPSULE | Refills: 1 | Status: SHIPPED | OUTPATIENT
Start: 2018-04-26 | End: 2018-05-25 | Stop reason: SDUPTHER

## 2018-05-25 DIAGNOSIS — E55.9 VITAMIN D DEFICIENCY: ICD-10-CM

## 2018-05-25 RX ORDER — ERGOCALCIFEROL 1.25 MG/1
50000 CAPSULE ORAL 3 TIMES WEEKLY
Qty: 24 CAPSULE | Refills: 1 | Status: SHIPPED | OUTPATIENT
Start: 2018-05-25 | End: 2018-06-20 | Stop reason: SDUPTHER

## 2018-06-20 DIAGNOSIS — E55.9 VITAMIN D DEFICIENCY: ICD-10-CM

## 2018-06-20 RX ORDER — ERGOCALCIFEROL 1.25 MG/1
50000 CAPSULE ORAL 3 TIMES WEEKLY
Qty: 36 CAPSULE | Refills: 1 | Status: SHIPPED | OUTPATIENT
Start: 2018-06-20 | End: 2019-02-06 | Stop reason: SDUPTHER

## 2018-07-24 RX ORDER — EZETIMIBE AND SIMVASTATIN 10; 40 MG/1; MG/1
1 TABLET ORAL NIGHTLY
Qty: 90 TABLET | Refills: 1 | Status: SHIPPED | OUTPATIENT
Start: 2018-07-24 | End: 2019-05-13 | Stop reason: SDUPTHER

## 2018-07-24 RX ORDER — METOPROLOL SUCCINATE 25 MG/1
TABLET, EXTENDED RELEASE ORAL
Qty: 90 TABLET | Refills: 1 | Status: SHIPPED | OUTPATIENT
Start: 2018-07-24 | End: 2019-02-06 | Stop reason: SDUPTHER

## 2018-11-21 RX ORDER — FEBUXOSTAT 80 MG/1
80 TABLET, FILM COATED ORAL DAILY
Qty: 30 TABLET | Refills: 0 | Status: SHIPPED | OUTPATIENT
Start: 2018-11-21 | End: 2019-01-08 | Stop reason: SDUPTHER

## 2018-12-19 RX ORDER — FEBUXOSTAT 80 MG/1
TABLET ORAL
Qty: 30 TABLET | Refills: 0 | OUTPATIENT
Start: 2018-12-19

## 2019-01-08 RX ORDER — SILDENAFIL 100 MG/1
100 TABLET, FILM COATED ORAL AS NEEDED
Qty: 10 TABLET | Refills: 0 | Status: SHIPPED | OUTPATIENT
Start: 2019-01-08 | End: 2019-02-06 | Stop reason: SDUPTHER

## 2019-01-08 RX ORDER — FEBUXOSTAT 80 MG/1
80 TABLET, FILM COATED ORAL DAILY
Qty: 30 TABLET | Refills: 0 | Status: SHIPPED | OUTPATIENT
Start: 2019-01-08 | End: 2019-02-06

## 2019-01-29 DIAGNOSIS — E55.9 VITAMIN D DEFICIENCY: Primary | Chronic | ICD-10-CM

## 2019-01-29 DIAGNOSIS — E29.1 HYPOGONADISM MALE: Chronic | ICD-10-CM

## 2019-01-29 DIAGNOSIS — Z00.00 ROUTINE PHYSICAL EXAMINATION: ICD-10-CM

## 2019-01-29 DIAGNOSIS — E55.9 VITAMIN D DEFICIENCY: Chronic | ICD-10-CM

## 2019-01-29 DIAGNOSIS — R73.01 IMPAIRED FASTING GLUCOSE: Chronic | ICD-10-CM

## 2019-02-01 LAB
25(OH)D3+25(OH)D2 SERPL-MCNC: 44.9 NG/ML (ref 30–100)
ALBUMIN SERPL-MCNC: 4.7 G/DL (ref 3.5–5.5)
ALBUMIN/GLOB SERPL: 1.5 {RATIO} (ref 1.2–2.2)
ALP SERPL-CCNC: 75 IU/L (ref 39–117)
ALT SERPL-CCNC: 60 IU/L (ref 0–44)
AST SERPL-CCNC: 28 IU/L (ref 0–40)
BASOPHILS # BLD AUTO: 0 X10E3/UL (ref 0–0.2)
BASOPHILS NFR BLD AUTO: 0 %
BILIRUB SERPL-MCNC: 0.4 MG/DL (ref 0–1.2)
BUN SERPL-MCNC: 17 MG/DL (ref 6–24)
BUN/CREAT SERPL: 18 (ref 9–20)
CALCIUM SERPL-MCNC: 9.8 MG/DL (ref 8.7–10.2)
CHLORIDE SERPL-SCNC: 102 MMOL/L (ref 96–106)
CHOLEST SERPL-MCNC: 127 MG/DL (ref 100–199)
CK SERPL-CCNC: 103 U/L (ref 24–204)
CO2 SERPL-SCNC: 24 MMOL/L (ref 20–29)
CREAT SERPL-MCNC: 0.92 MG/DL (ref 0.76–1.27)
EOSINOPHIL # BLD AUTO: 0.1 X10E3/UL (ref 0–0.4)
EOSINOPHIL NFR BLD AUTO: 2 %
ERYTHROCYTE [DISTWIDTH] IN BLOOD BY AUTOMATED COUNT: 14.1 % (ref 12.3–15.4)
GLOBULIN SER CALC-MCNC: 3.1 G/DL (ref 1.5–4.5)
GLUCOSE SERPL-MCNC: 115 MG/DL (ref 65–99)
HBA1C MFR BLD: 5.4 % (ref 4.8–5.6)
HCT VFR BLD AUTO: 44.7 % (ref 37.5–51)
HDL SERPL-SCNC: 28.6 UMOL/L
HDLC SERPL-MCNC: 33 MG/DL
HGB BLD-MCNC: 15.3 G/DL (ref 13–17.7)
IMM GRANULOCYTES # BLD AUTO: 0 X10E3/UL (ref 0–0.1)
IMM GRANULOCYTES NFR BLD AUTO: 0 %
LDL SERPL QN: 19.6 NM
LDL SERPL-SCNC: 674 NMOL/L
LDL SMALL SERPL-SCNC: 573 NMOL/L
LDLC SERPL CALC-MCNC: 54 MG/DL (ref 0–99)
LYMPHOCYTES # BLD AUTO: 2 X10E3/UL (ref 0.7–3.1)
LYMPHOCYTES NFR BLD AUTO: 37 %
MCH RBC QN AUTO: 29.7 PG (ref 26.6–33)
MCHC RBC AUTO-ENTMCNC: 34.2 G/DL (ref 31.5–35.7)
MCV RBC AUTO: 87 FL (ref 79–97)
MONOCYTES # BLD AUTO: 0.4 X10E3/UL (ref 0.1–0.9)
MONOCYTES NFR BLD AUTO: 8 %
NEUTROPHILS # BLD AUTO: 2.8 X10E3/UL (ref 1.4–7)
NEUTROPHILS NFR BLD AUTO: 53 %
PLATELET # BLD AUTO: 182 X10E3/UL (ref 150–379)
POTASSIUM SERPL-SCNC: 4.3 MMOL/L (ref 3.5–5.2)
PROT SERPL-MCNC: 7.8 G/DL (ref 6–8.5)
PSA SERPL-MCNC: 0.4 NG/ML (ref 0–4)
RBC # BLD AUTO: 5.16 X10E6/UL (ref 4.14–5.8)
SODIUM SERPL-SCNC: 142 MMOL/L (ref 134–144)
T3FREE SERPL-MCNC: 3.2 PG/ML (ref 2–4.4)
T4 FREE SERPL-MCNC: 1.16 NG/DL (ref 0.82–1.77)
TESTOST SERPL-MCNC: 262 NG/DL (ref 264–916)
TESTOSTERONE.FREE+WB MFR SERPL: 30.2 % (ref 9–46)
TESTOSTERONE.FREE+WB SERPL-MCNC: 79.1 NG/DL (ref 40–250)
TRIGL SERPL-MCNC: 200 MG/DL (ref 0–149)
TSH SERPL DL<=0.005 MIU/L-ACNC: 4.53 UIU/ML (ref 0.45–4.5)
WBC # BLD AUTO: 5.5 X10E3/UL (ref 3.4–10.8)

## 2019-02-06 ENCOUNTER — OFFICE VISIT (OUTPATIENT)
Dept: INTERNAL MEDICINE | Facility: CLINIC | Age: 49
End: 2019-02-06

## 2019-02-06 VITALS
BODY MASS INDEX: 41.75 KG/M2 | WEIGHT: 315 LBS | DIASTOLIC BLOOD PRESSURE: 72 MMHG | OXYGEN SATURATION: 94 % | SYSTOLIC BLOOD PRESSURE: 110 MMHG | HEIGHT: 73 IN | HEART RATE: 94 BPM

## 2019-02-06 DIAGNOSIS — E78.5 HYPERLIPIDEMIA, UNSPECIFIED HYPERLIPIDEMIA TYPE: Chronic | ICD-10-CM

## 2019-02-06 DIAGNOSIS — Z51.81 THERAPEUTIC DRUG MONITORING: ICD-10-CM

## 2019-02-06 DIAGNOSIS — E55.9 VITAMIN D DEFICIENCY: Chronic | ICD-10-CM

## 2019-02-06 DIAGNOSIS — I10 BENIGN ESSENTIAL HYPERTENSION: Chronic | ICD-10-CM

## 2019-02-06 DIAGNOSIS — R73.01 IMPAIRED FASTING GLUCOSE: Chronic | ICD-10-CM

## 2019-02-06 DIAGNOSIS — E03.8 SUBCLINICAL HYPOTHYROIDISM: ICD-10-CM

## 2019-02-06 DIAGNOSIS — Z23 NEED FOR INFLUENZA VACCINATION: ICD-10-CM

## 2019-02-06 DIAGNOSIS — G89.29 CHRONIC LOW BACK PAIN, UNSPECIFIED BACK PAIN LATERALITY, WITH SCIATICA PRESENCE UNSPECIFIED: Chronic | ICD-10-CM

## 2019-02-06 DIAGNOSIS — E79.0 ASYMPTOMATIC HYPERURICEMIA: Chronic | ICD-10-CM

## 2019-02-06 DIAGNOSIS — R74.8 ELEVATED LIVER ENZYMES: Chronic | ICD-10-CM

## 2019-02-06 DIAGNOSIS — M54.5 CHRONIC LOW BACK PAIN, UNSPECIFIED BACK PAIN LATERALITY, WITH SCIATICA PRESENCE UNSPECIFIED: Chronic | ICD-10-CM

## 2019-02-06 DIAGNOSIS — Z00.00 ROUTINE PHYSICAL EXAMINATION: Primary | ICD-10-CM

## 2019-02-06 DIAGNOSIS — E66.01 MORBID OBESITY (HCC): Chronic | ICD-10-CM

## 2019-02-06 DIAGNOSIS — Z87.442 HISTORY OF NEPHROLITHIASIS: Chronic | ICD-10-CM

## 2019-02-06 DIAGNOSIS — G47.33 OBSTRUCTIVE SLEEP APNEA: Chronic | ICD-10-CM

## 2019-02-06 DIAGNOSIS — E29.1 HYPOGONADISM MALE: Chronic | ICD-10-CM

## 2019-02-06 PROCEDURE — 90471 IMMUNIZATION ADMIN: CPT | Performed by: INTERNAL MEDICINE

## 2019-02-06 PROCEDURE — 99396 PREV VISIT EST AGE 40-64: CPT | Performed by: INTERNAL MEDICINE

## 2019-02-06 PROCEDURE — 90674 CCIIV4 VAC NO PRSV 0.5 ML IM: CPT | Performed by: INTERNAL MEDICINE

## 2019-02-06 RX ORDER — FEBUXOSTAT 80 MG/1
TABLET, FILM COATED ORAL
Qty: 30 TABLET | Refills: 11 | Status: SHIPPED | OUTPATIENT
Start: 2019-02-06 | End: 2019-03-15 | Stop reason: SDUPTHER

## 2019-02-06 NOTE — PROGRESS NOTES
02/06/2019    Patient Information  Victoriano Rowe                                                                                          [id number]  [place] [id number]      1970  [unfilled]  [phone] (work)    Chief Complaint:     Routine annual physical examination.  Follow-up impaired fasting glucose, hyperlipidemia, hypertension, hyperuricemia, chronic lower back pain, borderline hypogonadism, sleep apnea, history of kidney stones, vitamin D deficiency, elevated liver enzymes.  No new acute complaints.    History of Present Illness:    Patient with history of medical problems as outlined in chief complaint of been fairly stable for at least the past year and a half.  He presents today for his routine annual exam and follow-up lab work.  His past medical history reviewed and updated where necessary including health maintenance parameters.  This reveals he is up-to-date or account for after today's visit.    Review of Systems   Constitution: Negative.   HENT: Negative.    Eyes: Negative.    Cardiovascular: Negative.    Respiratory: Negative.    Endocrine: Negative.    Hematologic/Lymphatic: Negative.    Skin: Negative.    Musculoskeletal: Negative.    Gastrointestinal: Negative.    Genitourinary: Negative.    Neurological: Negative.    Psychiatric/Behavioral: Negative.    Allergic/Immunologic: Negative.        Active Problems:    Patient Active Problem List   Diagnosis   • Allergic rhinitis   • Asymptomatic hyperuricemia   • Benign essential hypertension   • Chronic lower back pain   • Hyperlipidemia   • Borderline Hypogonadism male   • Impaired fasting glucose   • Male erectile disorder   • Morbid obesity (CMS/HCC)   • Obstructive sleep apnea, 2008--tolerates CPAP well.   • Vitamin D deficiency   • History of nephrolithiasis   • Therapeutic drug monitoring   • Routine physical examination   • Multiple environmental allergies   • Elevated liver enzymes   • Subclinical  hypothyroidism         Past Medical History:   Diagnosis Date   • Allergic rhinitis 1/22/2016    Patient has never had allergy testing.  Symptoms are seasonal.   • Asymptomatic hyperuricemia 8/25/2011 08/08/2015--patient seen in follow-up tolerated the lower well.  Uric acid upper limit of normal at 6.7.  Uloric increased to 80 mg per day.  06/08/2015--uric acid not at goal.  Started on Uloric 40 mg per day.  08/25/2011--patient was noted to have elevated uric acid but has never had an episode of gout.  Allopurinol was initiated in hopes that this would reduce kidney stones.   • Benign essential hypertension 5/28/2008 02/16/2016--patient has lost about 60 pounds and his blood pressure is running low on half a dose of Benicar HCT 40/25.  Blood pressure medications discontinued except for metoprolol.  05/28/2008--treatment for hypertension begun.   • Borderline Hypogonadism male 8/25/2011 03/21/2017--patient seen in follow-up and he feels well and does not feel any different off of TRT.  His total testosterone is borderline low at 347.  His free and weakly bound testosterone is in the normal range at 77.0.  Patient needs to stay off of testosterone replacement therapy and we will continue to monitor.  06/30/2016--patient seen for a routine physical examination.  He reports that TRT   • Chronic lower back pain 6/19/2010    10/17/2014--patient presents with a five-day history of low back pain without radiation into the lower extremities.  No known trauma or injury.  He was making up the bed when this occurred.  No bowel or bladder symptoms.  Patient has a known history of herniated nucleus pulposus and bulging disks.  He has been taking some generic Aleve and this has not really helped much.  He had some leftover hyd   • Elevated liver enzymes 7/27/2017 02/06/2019--routine physical.  AST normal at 28, ALT mildly elevated at 60.  Suspect early PAGE despite normal liver ultrasound.  07/27/2017--routine  physical.  AST elevated at 52, ALT elevated at 97.  Hepatitis C antibody screening and hepatitis B surface antigen ordered.  Repeat CMP ordered.  Ultrasound liver.  Hepatitis C and hepatitis B returned negative.  Repeat CMP showed improved liver enzy   • History of cardiovascular stress test 02/15/2014    02/15/2014--Patient subsequently discontinued caffeine from his diet and all of his symptoms have resolved. 07/27/2012--patient was seen in evaluation by the cardiologist. He was complaining of some dizziness and his metoprolol was decreased from 25 mg per day to 12.5 mg per day which resulted in improvement of his symptoms. He was continuing to experience chronic chest discomfort as well as palp   • History of echocardiogram 10/01/2008    10/01/2008--echocardiogram reveals left ventricular chamber dimensions normal with mild concentric hypertrophy. Wall motion appears normal. Left atrium mildly dilated. Right ventricle not well seen but appears to contract normally. Right atrium is normal size by visual estimate. The mitral valve leaflets are mobile. The inflow pattern suggests abnormal relaxation. No significant regurgitation appr   • History of nephrolithiasis 7/19/2011 08/25/2011-- allopurinol initiated for hyperuricemia.  07/19/2011--patient presented with right flank pain with radiation into the right back. CT scan of the abdomen revealed a 4 mm stone in the dependent aspect of the urinary bladder.  Likely recent passage from the right ureter where there is mild right hydronephrosis.  1.6 cm nonobstructing stone at the lower pole of the left kidney and a punctate nonobstructing stone at the upper pole of the left kidney.  Patient passed the stone spontaneously.  Calcium oxalate.   • History of palpitations 02/15/2014    02/15/2014--Patient subsequently discontinued caffeine from his diet and all of his symptoms have resolved.   07/27/2012--patient was seen in evaluation by the cardiologist. He was  complaining of some dizziness and his metoprolol was decreased from 25 mg per day to 12.5 mg per day which resulted in improvement of his symptoms. He was continuing to experience chronic chest discomfort as well as pa   • Hyperlipidemia 9/28/2008 09/28/2008--treatment for hyperlipidemia begun   • Impaired fasting glucose 4/1/2011 04/01/2011--initial diagnosis of impaired fasting glucose.  Initial hemoglobin A1c normal at 5.5.  A few months later 5.7.  Has never been significantly elevated.   • Male erectile disorder 1/22/2016   • Morbid obesity (CMS/HCC) 1/22/2016   • Multiple environmental allergies 1/22/2016    Patient has never had allergy testing.  Symptoms are seasonal.   • Obstructive sleep apnea, 2008--tolerates CPAP well. 1/22/2016    Diagnosed in spring of 2008.  Tolerates CPAP well.   • Subclinical hypothyroidism 2/6/2019 02/06/2019--routine physical.  TSH is slightly elevated at 4.53.  Free T3 and free T4 are normal.  Close observation.   • Vitamin D deficiency 1/22/2016         Past Surgical History:   Procedure Laterality Date   • KIDNEY SURGERY Left     Age [age].  Patient had some sort of swelling of his left kidney, possibly a cyst.  Surgical resection.  No further problems.         Allergies   Allergen Reactions   • Penicillins            Current Outpatient Medications:   •  aspirin 81 MG tablet, Take 1 tablet by mouth daily., Disp: , Rfl:   •  Cholecalciferol (VITAMIN D3) 60557 UNITS capsule, Take 1 by mouth 3 times a week on Monday Wednesday and Friday., Disp: 12 capsule, Rfl: 0  •  ezetimibe-simvastatin (VYTORIN) 10-40 MG per tablet, TAKE 1 TABLET BY MOUTH EVERY NIGHT., Disp: 90 tablet, Rfl: 1  •  febuxostat (ULORIC) 80 MG tablet tablet, Take 1 tablet by mouth Daily. PT NEEDS LABS AND PHYSICAL ASAP !, Disp: 30 tablet, Rfl: 0  •  metoprolol succinate XL (TOPROL-XL) 25 MG 24 hr tablet, Take 1 tablet by mouth Daily., Disp: 90 tablet, Rfl: 1  •  Multiple Vitamins tablet, Take 1 tablet by  "mouth daily., Disp: , Rfl:   •  sildenafil (VIAGRA) 100 MG tablet, Take 1 tablet (100 mg total) by mouth daily as needed for erectile dysfunction, Disp: 40 tablet, Rfl: 3      Family History   Problem Relation Age of Onset   • Alcohol abuse Father    • Heart failure Father    • Emphysema Father    • Diabetes Father    • Diabetes Sister          Social History     Socioeconomic History   • Marital status:      Spouse name: Not on file   • Number of children: 0   • Years of education: Not on file   • Highest education level: Some college, no degree   Social Needs   • Financial resource strain: Not hard at all   • Food insecurity - worry: Never true   • Food insecurity - inability: Never true   • Transportation needs - medical: No   • Transportation needs - non-medical: No   Occupational History   • 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   Tobacco Use   • Smoking status: Never Smoker   • Smokeless tobacco: Never Used   Substance and Sexual Activity   • Alcohol use: Yes     Frequency: 2-4 times a month     Drinks per session: 1 or 2     Comment: Social alcohol consumption   • Drug use: No   • Sexual activity: Yes     Partners: Female   Other Topics Concern   • Not on file   Social History Narrative   • Not on file         Vitals:    02/06/19 1021   BP: 110/72   Pulse: 94   SpO2: 94%   Weight: (!) 154 kg (340 lb)   Height: 185 cm (72.84\")          Physical Exam:    General: Alert and oriented x 3, with appropriate affect; no acute distress. Obese. HEENT: pupils equal, round, and reactive to light; extraocular movements intact; sclera nonicteric; nasal mucosa normal; pharynx normal; tympanic membranes and ear canals normal.  Neck: without JVD, thyromegaly, bruit, or adenopathy.  Lungs: clear to auscultation in all fields.  Heart: auscultation reveals regular rate and rhythm without murmur, rub, gallop, or click.  Abdomen: is soft and nontender, without hepatosplenomegaly, mass or hernia. Normal bowel sounds.  " GYN, Digital rectal exam, Breast; deferred to gynecologist.  Extremities: are without clubbing, cyanosis, or edema.  Vascular: no signs of peripheral arterial disease or venous insufficiency/varicosities.  Neurological: intact without focal deficit, including cranial and peripheral nerves.  Station and gait observed to be normal during ingress and egress from the examination area.  Sensation and deep tendon reflexes tested if clinically indicated and are normal.  Musculoskeletal: exam is normal, without signs of synovitis, significant degeneration or deformity. Skin examination: without rash or significant lesions.    Lab/other results:    NMR reveals a total cholesterol 127.  Triglycerides mildly elevated at 200.  LDL particle number excellent at 674.  Small LDL particle number slightly elevated at 573.  HDL particle number is low at 28.6.  CBC is normal.  CMP normal except blood sugar elevated 115 and ALT slightly elevated at 60.  Total testosterone was low at 262 but free and weakly bound testosterone is normal at 79.1.  Hemoglobin A1c normal at 5.4.  TSH is mildly elevated at 4.53.  T3 and T4 are normal.  PSA normal at 0.4.  Vitamin D normal at 44.9.  CPK normal.    Assessment/Plan:     Diagnosis Plan   1. Routine physical examination  CBC (No Diff)    CK    Comprehensive Metabolic Panel    Hemoglobin A1c    NMR LipoProfile    Vitamin D 25 Hydroxy    Urinalysis With Microscopic If Indicated (No Culture) - Urine, Clean Catch    TSH    T4, Free    T3, Free    PSA DIAGNOSTIC    Uric Acid   2. Impaired fasting glucose  Hemoglobin A1c   3. Hyperlipidemia, unspecified hyperlipidemia type  CK    NMR LipoProfile   4. Benign essential hypertension     5. Asymptomatic hyperuricemia  Uric Acid   6. Chronic low back pain, unspecified back pain laterality, with sciatica presence unspecified     7. Borderline Hypogonadism male     8. Morbid obesity (CMS/HCC)     9. Obstructive sleep apnea, 2008--tolerates CPAP well.     10.  Vitamin D deficiency     11. History of nephrolithiasis     12. Elevated liver enzymes     13. Therapeutic drug monitoring     14. Need for influenza vaccination     15. Subclinical hypothyroidism  TSH    T4, Free    T3, Free    Thyroid Antibodies       Patient presents with essentially normal annual physical except following issues: He has very mild impaired fasting glucose but a normal hemoglobin A1c.  He is currently following a low carbohydrate diet which I strongly recommended.  Hyperlipidemia is under good control on the current regimen.  Blood pressure seems to be controlled with metoprolol.  Chronic lower back pain is currently not much of an issue.  Hyperuricemia is treated with Uloric and he is tolerating it well.  The hypogonadism is borderline and not bad enough to warrant any treatment.  He has sleep apnea and tolerate CPAP well.  Vitamin D is therapeutic.  His liver enzymes are persistent but improved and I suspect early PAGE.  No recent kidney stones.  Possible new diagnosis of subclinical hypothyroidism needs close monitoring.    Several preventative health issues discussed including review of vaccinations and recommendations, including dietary issues, exercise and weight loss.  Safe sex practices discussed.  Patient advised to wear seatbelt whenever driving and avoid texting and driving.  Also advised to look both ways before crossing the street.  Colon cancer prevention discussed and is up-to-date with colonoscopy.  Advised to avoid tobacco products and minimize alcohol consumption.    Plan is as follows: No change in current medical regimen.  We will schedule routine physical for 1 year.  I will have patient come in and obtain nonfasting lab work to check his thyroid status in about 2 months.  He can follow-up on the phone for the results and possible further instructions.  Influenza vaccine given today.      Procedures

## 2019-02-13 DIAGNOSIS — N52.9 MALE ERECTILE DISORDER: ICD-10-CM

## 2019-02-15 RX ORDER — SILDENAFIL 100 MG/1
100 TABLET, FILM COATED ORAL DAILY PRN
Qty: 5 TABLET | Refills: 5 | OUTPATIENT
Start: 2019-02-15

## 2019-02-17 DIAGNOSIS — E55.9 VITAMIN D DEFICIENCY: ICD-10-CM

## 2019-02-18 RX ORDER — CHOLECALCIFEROL (VITAMIN D3) 1250 MCG
CAPSULE ORAL
Qty: 12 CAPSULE | Refills: 2 | Status: SHIPPED | OUTPATIENT
Start: 2019-02-18 | End: 2020-01-31 | Stop reason: SDUPTHER

## 2019-03-14 RX ORDER — ERGOCALCIFEROL 1.25 MG/1
CAPSULE ORAL
Qty: 12 CAPSULE | Refills: 3 | Status: SHIPPED | OUTPATIENT
Start: 2019-03-14 | End: 2019-03-19 | Stop reason: SDUPTHER

## 2019-03-15 DIAGNOSIS — E79.0 ASYMPTOMATIC HYPERURICEMIA: Chronic | ICD-10-CM

## 2019-03-15 RX ORDER — FEBUXOSTAT 80 MG/1
TABLET, FILM COATED ORAL
Qty: 30 TABLET | Refills: 11 | Status: SHIPPED | OUTPATIENT
Start: 2019-03-15 | End: 2019-03-15 | Stop reason: SDUPTHER

## 2019-03-15 RX ORDER — FEBUXOSTAT 80 MG/1
TABLET, FILM COATED ORAL
Qty: 30 TABLET | Refills: 11 | Status: SHIPPED | OUTPATIENT
Start: 2019-03-15 | End: 2019-09-10

## 2019-03-19 RX ORDER — ERGOCALCIFEROL 1.25 MG/1
50000 CAPSULE ORAL 3 TIMES WEEKLY
Qty: 12 CAPSULE | Refills: 3 | Status: SHIPPED | OUTPATIENT
Start: 2019-03-20 | End: 2020-02-07

## 2019-04-08 LAB
T3FREE SERPL-MCNC: 3.3 PG/ML (ref 2–4.4)
T4 FREE SERPL-MCNC: 0.99 NG/DL (ref 0.93–1.7)
THYROGLOB AB SERPL-ACNC: <1 IU/ML (ref 0–0.9)
THYROPEROXIDASE AB SERPL-ACNC: 9 IU/ML (ref 0–34)
TSH SERPL DL<=0.005 MIU/L-ACNC: 5 MIU/ML (ref 0.27–4.2)

## 2019-05-13 RX ORDER — METOPROLOL SUCCINATE 25 MG/1
25 TABLET, EXTENDED RELEASE ORAL DAILY
Qty: 30 TABLET | Refills: 3 | Status: SHIPPED | OUTPATIENT
Start: 2019-05-13 | End: 2019-12-10 | Stop reason: SDUPTHER

## 2019-05-13 RX ORDER — EZETIMIBE AND SIMVASTATIN 10; 40 MG/1; MG/1
1 TABLET ORAL NIGHTLY
Qty: 30 TABLET | Refills: 3 | Status: SHIPPED | OUTPATIENT
Start: 2019-05-13 | End: 2019-05-14 | Stop reason: SDUPTHER

## 2019-05-14 RX ORDER — EZETIMIBE AND SIMVASTATIN 10; 40 MG/1; MG/1
1 TABLET ORAL NIGHTLY
Qty: 30 TABLET | Refills: 5 | Status: SHIPPED | OUTPATIENT
Start: 2019-05-14 | End: 2019-05-30 | Stop reason: SDUPTHER

## 2019-05-30 RX ORDER — EZETIMIBE AND SIMVASTATIN 10; 40 MG/1; MG/1
1 TABLET ORAL NIGHTLY
Qty: 30 TABLET | Refills: 5 | Status: SHIPPED | OUTPATIENT
Start: 2019-05-30 | End: 2019-06-04 | Stop reason: SDUPTHER

## 2019-06-04 RX ORDER — EZETIMIBE AND SIMVASTATIN 10; 40 MG/1; MG/1
1 TABLET ORAL NIGHTLY
Qty: 30 TABLET | Refills: 5 | Status: SHIPPED | OUTPATIENT
Start: 2019-06-04 | End: 2019-06-21 | Stop reason: SDUPTHER

## 2019-06-21 RX ORDER — EZETIMIBE AND SIMVASTATIN 10; 40 MG/1; MG/1
1 TABLET ORAL NIGHTLY
Qty: 30 TABLET | Refills: 5 | Status: SHIPPED | OUTPATIENT
Start: 2019-06-21 | End: 2019-07-02 | Stop reason: SDUPTHER

## 2019-07-02 ENCOUNTER — TELEPHONE (OUTPATIENT)
Dept: INTERNAL MEDICINE | Facility: CLINIC | Age: 49
End: 2019-07-02

## 2019-07-02 RX ORDER — EZETIMIBE AND SIMVASTATIN 10; 40 MG/1; MG/1
1 TABLET ORAL NIGHTLY
Qty: 30 TABLET | Refills: 5 | Status: SHIPPED | OUTPATIENT
Start: 2019-07-02 | End: 2020-01-28 | Stop reason: SDUPTHER

## 2019-09-10 ENCOUNTER — TELEPHONE (OUTPATIENT)
Dept: INTERNAL MEDICINE | Facility: CLINIC | Age: 49
End: 2019-09-10

## 2019-09-10 DIAGNOSIS — E79.0 ASYMPTOMATIC HYPERURICEMIA: Chronic | ICD-10-CM

## 2019-09-10 DIAGNOSIS — E79.0 ASYMPTOMATIC HYPERURICEMIA: Primary | Chronic | ICD-10-CM

## 2019-09-10 RX ORDER — ALLOPURINOL 300 MG/1
TABLET ORAL
Qty: 90 TABLET | Refills: 3 | Status: SHIPPED | OUTPATIENT
Start: 2019-09-10 | End: 2019-09-10 | Stop reason: SDUPTHER

## 2019-09-10 RX ORDER — ALLOPURINOL 300 MG/1
TABLET ORAL
Qty: 90 TABLET | Refills: 3 | Status: SHIPPED | OUTPATIENT
Start: 2019-09-10 | End: 2020-01-22 | Stop reason: SDUPTHER

## 2019-09-10 RX ORDER — ERGOCALCIFEROL 1.25 MG/1
CAPSULE ORAL
Qty: 12 CAPSULE | Refills: 3 | Status: SHIPPED | OUTPATIENT
Start: 2019-09-10 | End: 2020-02-07

## 2019-09-10 NOTE — TELEPHONE ENCOUNTER
Inform patient that I have been taking patients off of Uloric due to recent data that it may increase the risk of heart problems.  I sent a prescription for allopurinol 300 mg/day to the [facility] mail order pharmacy.  This is much less expensive.

## 2019-09-10 NOTE — TELEPHONE ENCOUNTER
Pt isnt with [facility] anymore, says he uses the [facility] pharmacy on [address]

## 2019-09-10 NOTE — TELEPHONE ENCOUNTER
Regarding: Prescription Question  Contact: [phone]  ----- Message from [employer], Generic sent at 9/10/2019 11:10 AM EDT -----    I have a prescription for Uloric 80mg and the discount card program I have been using has been eliminated. I found that there is now a generic alternative to Uloric that is available, Febuxostat. I was wondering if switching my prescription to that is viable. Do I need to come in for an office visit to do that? Thanks very much.

## 2019-12-11 RX ORDER — METOPROLOL SUCCINATE 25 MG/1
TABLET, EXTENDED RELEASE ORAL
Qty: 30 TABLET | Refills: 3 | Status: SHIPPED | OUTPATIENT
Start: 2019-12-11 | End: 2020-01-28 | Stop reason: SDUPTHER

## 2020-01-22 ENCOUNTER — TELEPHONE (OUTPATIENT)
Dept: INTERNAL MEDICINE | Facility: CLINIC | Age: 50
End: 2020-01-22

## 2020-01-22 DIAGNOSIS — E79.0 ASYMPTOMATIC HYPERURICEMIA: Chronic | ICD-10-CM

## 2020-01-22 RX ORDER — ALLOPURINOL 300 MG/1
TABLET ORAL
Qty: 30 TABLET | Refills: 0 | Status: SHIPPED | OUTPATIENT
Start: 2020-01-22 | End: 2020-01-28 | Stop reason: SDUPTHER

## 2020-01-22 NOTE — TELEPHONE ENCOUNTER
----- Message from Victoriano Rowe sent at 1/22/2020 11:43 AM EST -----  Regarding: RE: Prescription Question  Contact: [phone]  [facility] on [address]. Thanks so much for your help.  ----- Message -----  From: MIHAELA BRINK  Sent: 1/22/2020 11:31 AM EST  To: Victoriano Rowe  Subject: RE: Prescription Question  Which pharmacy would you like the 30 day sent to?    ----- Message -----  From: Victoriano Rowe  Sent: 1/22/2020  11:12 AM EST  To: [facility]  Subject: Prescription Question                            Is it possible to get my allopurinol 300 MG tablet prescription in a 30 day quantity instead of 90? I am in the process of moving all of my prescriptions to [facility], but they won't have me set up in time for my next refill, so I would like to get a 30-day supply of everything.  The allopurinol is the only one that currently has a 90 day prescription. Thanks very much.

## 2020-01-28 DIAGNOSIS — E79.0 ASYMPTOMATIC HYPERURICEMIA: Chronic | ICD-10-CM

## 2020-01-28 RX ORDER — METOPROLOL SUCCINATE 25 MG/1
25 TABLET, EXTENDED RELEASE ORAL DAILY
Qty: 90 TABLET | Refills: 0 | Status: SHIPPED | OUTPATIENT
Start: 2020-01-28 | End: 2020-04-06

## 2020-01-28 RX ORDER — ALLOPURINOL 300 MG/1
TABLET ORAL
Qty: 90 TABLET | Refills: 0 | Status: SHIPPED | OUTPATIENT
Start: 2020-01-28 | End: 2020-04-06

## 2020-01-28 RX ORDER — EZETIMIBE AND SIMVASTATIN 10; 40 MG/1; MG/1
1 TABLET ORAL NIGHTLY
Qty: 90 TABLET | Refills: 0 | Status: SHIPPED | OUTPATIENT
Start: 2020-01-28 | End: 2020-04-06

## 2020-01-31 DIAGNOSIS — R73.01 IMPAIRED FASTING GLUCOSE: Chronic | ICD-10-CM

## 2020-01-31 DIAGNOSIS — E55.9 VITAMIN D DEFICIENCY: ICD-10-CM

## 2020-01-31 DIAGNOSIS — E79.0 ASYMPTOMATIC HYPERURICEMIA: Chronic | ICD-10-CM

## 2020-01-31 DIAGNOSIS — E78.5 HYPERLIPIDEMIA, UNSPECIFIED HYPERLIPIDEMIA TYPE: Chronic | ICD-10-CM

## 2020-01-31 DIAGNOSIS — Z00.00 ROUTINE PHYSICAL EXAMINATION: ICD-10-CM

## 2020-01-31 RX ORDER — CHOLECALCIFEROL (VITAMIN D3) 1250 MCG
CAPSULE ORAL
Qty: 12 CAPSULE | Refills: 2 | Status: SHIPPED | OUTPATIENT
Start: 2020-01-31 | End: 2020-03-05

## 2020-02-01 LAB
25(OH)D3+25(OH)D2 SERPL-MCNC: 49.3 NG/ML (ref 30–100)
ALBUMIN SERPL-MCNC: 4.7 G/DL (ref 3.5–5.2)
ALBUMIN/GLOB SERPL: 1.9 G/DL
ALP SERPL-CCNC: 64 U/L (ref 39–117)
ALT SERPL-CCNC: 59 U/L (ref 1–41)
AST SERPL-CCNC: 31 U/L (ref 1–40)
BILIRUB SERPL-MCNC: 0.4 MG/DL (ref 0.2–1.2)
BUN SERPL-MCNC: 16 MG/DL (ref 6–20)
BUN/CREAT SERPL: 16.8 (ref 7–25)
CALCIUM SERPL-MCNC: 9.2 MG/DL (ref 8.6–10.5)
CHLORIDE SERPL-SCNC: 104 MMOL/L (ref 98–107)
CHOLEST SERPL-MCNC: 98 MG/DL (ref 100–199)
CK SERPL-CCNC: 84 U/L (ref 20–200)
CO2 SERPL-SCNC: 28.7 MMOL/L (ref 22–29)
CREAT SERPL-MCNC: 0.95 MG/DL (ref 0.76–1.27)
ERYTHROCYTE [DISTWIDTH] IN BLOOD BY AUTOMATED COUNT: 13.4 % (ref 12.3–15.4)
GLOBULIN SER CALC-MCNC: 2.5 GM/DL
GLUCOSE SERPL-MCNC: 107 MG/DL (ref 65–99)
HBA1C MFR BLD: 5.7 % (ref 4.8–5.6)
HCT VFR BLD AUTO: 43.7 % (ref 37.5–51)
HDL SERPL-SCNC: 26.9 UMOL/L
HDLC SERPL-MCNC: 30 MG/DL
HGB BLD-MCNC: 14.5 G/DL (ref 13–17.7)
LDL SERPL QN: 19.7 NM
LDL SERPL-SCNC: 456 NMOL/L
LDL SMALL SERPL-SCNC: 323 NMOL/L
LDLC SERPL CALC-MCNC: 38 MG/DL (ref 0–99)
MCH RBC QN AUTO: 29.1 PG (ref 26.6–33)
MCHC RBC AUTO-ENTMCNC: 33.2 G/DL (ref 31.5–35.7)
MCV RBC AUTO: 87.6 FL (ref 79–97)
PLATELET # BLD AUTO: 182 10*3/MM3 (ref 140–450)
POTASSIUM SERPL-SCNC: 4.4 MMOL/L (ref 3.5–5.2)
PROT SERPL-MCNC: 7.2 G/DL (ref 6–8.5)
PSA SERPL-MCNC: 0.37 NG/ML (ref 0–4)
RBC # BLD AUTO: 4.99 10*6/MM3 (ref 4.14–5.8)
SODIUM SERPL-SCNC: 147 MMOL/L (ref 136–145)
T3FREE SERPL-MCNC: 3.4 PG/ML (ref 2–4.4)
T4 FREE SERPL-MCNC: 1.2 NG/DL (ref 0.93–1.7)
TRIGL SERPL-MCNC: 149 MG/DL (ref 0–149)
TSH SERPL DL<=0.005 MIU/L-ACNC: 4.91 UIU/ML (ref 0.27–4.2)
URATE SERPL-MCNC: 5.5 MG/DL (ref 3.4–7)
WBC # BLD AUTO: 6 10*3/MM3 (ref 3.4–10.8)

## 2020-02-07 ENCOUNTER — OFFICE VISIT (OUTPATIENT)
Dept: INTERNAL MEDICINE | Facility: CLINIC | Age: 50
End: 2020-02-07

## 2020-02-07 VITALS
WEIGHT: 315 LBS | HEIGHT: 75 IN | SYSTOLIC BLOOD PRESSURE: 142 MMHG | OXYGEN SATURATION: 93 % | BODY MASS INDEX: 39.17 KG/M2 | DIASTOLIC BLOOD PRESSURE: 90 MMHG | HEART RATE: 90 BPM

## 2020-02-07 DIAGNOSIS — Z87.442 HISTORY OF NEPHROLITHIASIS: Chronic | ICD-10-CM

## 2020-02-07 DIAGNOSIS — E79.0 ASYMPTOMATIC HYPERURICEMIA: Chronic | ICD-10-CM

## 2020-02-07 DIAGNOSIS — R73.01 IMPAIRED FASTING GLUCOSE: Chronic | ICD-10-CM

## 2020-02-07 DIAGNOSIS — Z00.00 ROUTINE PHYSICAL EXAMINATION: Primary | ICD-10-CM

## 2020-02-07 DIAGNOSIS — E55.9 VITAMIN D DEFICIENCY: Chronic | ICD-10-CM

## 2020-02-07 DIAGNOSIS — G47.33 OBSTRUCTIVE SLEEP APNEA: Chronic | ICD-10-CM

## 2020-02-07 DIAGNOSIS — Z91.09 MULTIPLE ENVIRONMENTAL ALLERGIES: Chronic | ICD-10-CM

## 2020-02-07 DIAGNOSIS — I10 BENIGN ESSENTIAL HYPERTENSION: Chronic | ICD-10-CM

## 2020-02-07 DIAGNOSIS — E66.01 MORBID OBESITY (HCC): Chronic | ICD-10-CM

## 2020-02-07 DIAGNOSIS — R74.8 ELEVATED LIVER ENZYMES: Chronic | ICD-10-CM

## 2020-02-07 DIAGNOSIS — Z51.81 THERAPEUTIC DRUG MONITORING: ICD-10-CM

## 2020-02-07 DIAGNOSIS — E03.8 SUBCLINICAL HYPOTHYROIDISM: Chronic | ICD-10-CM

## 2020-02-07 DIAGNOSIS — E78.2 MIXED HYPERLIPIDEMIA: Chronic | ICD-10-CM

## 2020-02-07 DIAGNOSIS — Z23 NEED FOR INFLUENZA VACCINATION: ICD-10-CM

## 2020-02-07 DIAGNOSIS — Z12.11 COLON CANCER SCREENING: ICD-10-CM

## 2020-02-07 PROCEDURE — 99396 PREV VISIT EST AGE 40-64: CPT | Performed by: INTERNAL MEDICINE

## 2020-02-07 PROCEDURE — 90674 CCIIV4 VAC NO PRSV 0.5 ML IM: CPT | Performed by: INTERNAL MEDICINE

## 2020-02-07 PROCEDURE — 90471 IMMUNIZATION ADMIN: CPT | Performed by: INTERNAL MEDICINE

## 2020-02-07 NOTE — PROGRESS NOTES
02/07/2020    Patient Information  Victoriano Rowe                                                                                          [id number]  [place] [address]      1970  [unfilled]  [phone] (work)    Chief Complaint:     Routine annual physical examination and follow-up lab work.  No new acute complaints.    History of Present Illness:    Patient with history of impaired fasting glucose, hyperlipidemia, hypertension, hyperuricemia, morbid obesity, sleep apnea, vitamin D deficiency, history of kidney stones, environmental allergies, elevated liver enzymes, subclinical hypothyroidism.  He presents today for his routine annual physical exam and follow-up lab work.  His past medical history reviewed and updated were necessary including health maintenance parameters.  This reveals he will be up-to-date or else accounted for after today's visit.    Review of Systems   Constitution: Negative.   HENT: Negative.    Eyes: Negative.    Cardiovascular: Negative.    Respiratory: Negative.    Endocrine: Negative.    Hematologic/Lymphatic: Negative.    Skin: Negative.    Musculoskeletal: Negative.    Gastrointestinal: Negative.    Genitourinary: Negative.    Neurological: Negative.    Psychiatric/Behavioral: Negative.    Allergic/Immunologic: Negative.        Active Problems:    Patient Active Problem List   Diagnosis   • Allergic rhinitis   • Asymptomatic hyperuricemia   • Benign essential hypertension   • Chronic bilateral low back pain without sciatica   • Hyperlipidemia   • Borderline Hypogonadism male   • Impaired fasting glucose   • Male erectile disorder   • Morbid obesity (CMS/[facility])   • Obstructive sleep apnea, 2008--tolerates CPAP well.   • Vitamin D deficiency   • History of nephrolithiasis   • Therapeutic drug monitoring   • Routine physical examination   • Multiple environmental allergies   • Elevated liver enzymes   • Subclinical hypothyroidism         Past Medical  History:   Diagnosis Date   • Allergic rhinitis 1/22/2016    Patient has never had allergy testing.  Symptoms are seasonal.   • Asymptomatic hyperuricemia 8/25/2011 08/08/2015--patient seen in follow-up tolerated the lower well.  Uric acid upper limit of normal at 6.7.  Uloric increased to 80 mg per day.  06/08/2015--uric acid not at goal.  Started on Uloric 40 mg per day.  08/25/2011--patient was noted to have elevated uric acid but has never had an episode of gout.  Allopurinol was initiated in hopes that this would reduce kidney stones.   • Benign essential hypertension 5/28/2008 02/16/2016--patient has lost about 60 pounds and his blood pressure is running low on half a dose of Benicar HCT 40/25.  Blood pressure medications discontinued except for metoprolol.  05/28/2008--treatment for hypertension begun.   • Borderline Hypogonadism male 8/25/2011 03/21/2017--patient seen in follow-up and he feels well and does not feel any different off of TRT.  His total testosterone is borderline low at 347.  His free and weakly bound testosterone is in the normal range at 77.0.  Patient needs to stay off of testosterone replacement therapy and we will continue to monitor.  06/30/2016--patient seen for a routine physical examination.  He reports that TRT   • Chronic bilateral low back pain without sciatica 6/19/2010    10/17/2014--patient presents with a five-day history of low back pain without radiation into the lower extremities.  No known trauma or injury.  He was making up the bed when this occurred.  No bowel or bladder symptoms.  Patient has a known history of herniated nucleus pulposus and bulging disks.  He has been taking some generic Aleve and this has not really helped much.  He had some leftover hyd   • Elevated liver enzymes 7/27/2017 02/06/2019--routine physical.  AST normal at 28, ALT mildly elevated at 60.  Suspect early PAGE despite normal liver ultrasound.  07/27/2017--routine physical.  AST  elevated at 52, ALT elevated at 97.  Hepatitis C antibody screening and hepatitis B surface antigen ordered.  Repeat CMP ordered.  Ultrasound liver.  Hepatitis C and hepatitis B returned negative.  Repeat CMP showed improved liver enzy   • History of nephrolithiasis 7/19/2011 08/25/2011-- allopurinol initiated for hyperuricemia.  07/19/2011--patient presented with right flank pain with radiation into the right back. CT scan of the abdomen revealed a 4 mm stone in the dependent aspect of the urinary bladder.  Likely recent passage from the right ureter where there is mild right hydronephrosis.  1.6 cm nonobstructing stone at the lower pole of the left kidney and a punctate nonobstructing stone at the upper pole of the left kidney.  Patient passed the stone spontaneously.  Calcium oxalate.   • Hyperlipidemia 9/28/2008 09/28/2008--treatment for hyperlipidemia begun   • Impaired fasting glucose 4/1/2011 04/01/2011--initial diagnosis of impaired fasting glucose.  Initial hemoglobin A1c normal at 5.5.  A few months later 5.7.  Has never been significantly elevated.   • Male erectile disorder 1/22/2016   • Morbid obesity (CMS/HCC) 1/22/2016   • Multiple environmental allergies 1/22/2016    Patient has never had allergy testing.  Symptoms are seasonal.   • Obstructive sleep apnea, 2008--tolerates CPAP well. 1/22/2016    Diagnosed in spring of 2008.  Tolerates CPAP well.   • Subclinical hypothyroidism 2/6/2019 02/06/2019--routine physical.  TSH is slightly elevated at 4.53.  Free T3 and free T4 are normal.  Close observation.   • Vitamin D deficiency 1/22/2016         Past Surgical History:   Procedure Laterality Date   • KIDNEY SURGERY Left     Age [age].  Patient had some sort of swelling of his left kidney, possibly a cyst.  Surgical resection.  No further problems.         Allergies   Allergen Reactions   • Penicillins            Current Outpatient Medications:   •  allopurinol (ZYLOPRIM) 300 MG tablet, Take  1 p.o. daily for gout, Disp: 90 tablet, Rfl: 0  •  aspirin 81 MG tablet, Take 1 tablet by mouth Daily., Disp: 90 tablet, Rfl: 0  •  Cholecalciferol (VITAMIN D3) 1.25 MG (45011 UT) capsule, Take 1 by mouth 3 times a week on Monday Wednesday and Friday., Disp: 12 capsule, Rfl: 2  •  ezetimibe-simvastatin (VYTORIN) 10-40 MG per tablet, Take 1 tablet by mouth Every Night., Disp: 90 tablet, Rfl: 0  •  metoprolol succinate XL (TOPROL-XL) 25 MG 24 hr tablet, Take 1 tablet by mouth Daily., Disp: 90 tablet, Rfl: 0  •  Multiple Vitamins tablet, Take 1 tablet by mouth daily., Disp: , Rfl:   •  sildenafil (VIAGRA) 100 MG tablet, Take 1 tablet by mouth Daily As Needed for erectile dysfunction., Disp: 5 tablet, Rfl: 5      Family History   Problem Relation Age of Onset   • Alcohol abuse Father    • Heart failure Father    • Emphysema Father    • Diabetes Father    • Diabetes Sister          Social History     Socioeconomic History   • Marital status:      Spouse name: Not on file   • Number of children: 0   • Years of education: Not on file   • Highest education level: Some college, no degree   Occupational History   • 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   Social Needs   • Financial resource strain: Not hard at all   • Food insecurity:     Worry: Never true     Inability: Never true   • Transportation needs:     Medical: No     Non-medical: No   Tobacco Use   • Smoking status: Never Smoker   • Smokeless tobacco: Never Used   Substance and Sexual Activity   • Alcohol use: Yes     Frequency: 2-4 times a month     Drinks per session: 1 or 2     Binge frequency: Never     Comment: Social alcohol consumption   • Drug use: No   • Sexual activity: Yes     Partners: Female   Lifestyle   • Physical activity:     Days per week: 0 days     Minutes per session: 0 min   • Stress: Not at all   Relationships   • Social connections:     Talks on phone: Twice a week     Gets together: Once a week     Attends [employer] service: Never  "    Active member of club or organization: Yes     Attends meetings of clubs or organizations: More than 4 times per year     Relationship status:          Vitals:    02/07/20 0709   BP: 142/90   BP Location: Left arm   Pulse: 90   SpO2: 93%   Weight: (!) 156 kg (343 lb)   Height: 189.2 cm (74.5\")        Body mass index is 43.45 kg/m².      Physical Exam:    General: Alert and oriented x 3.  No acute distress.  Normal affect.  Morbidly obese.  HEENT: Pupils equal, round, reactive to light; extraocular movements intact; sclerae nonicteric; pharynx, ear canals and TMs normal.  Neck: Without JVD, thyromegaly, bruit, or adenopathy.  Lungs: Clear to auscultation in all fields.  Heart: Regular rate and rhythm without murmur, rub, gallop, or click.  Abdomen: Soft, nontender, without hepatosplenomegaly or hernia.  Bowel sounds normal.  : Deferred.  Rectal: Deferred.  Extremities: Without clubbing, cyanosis, edema, or pulse deficit.  Neurologic: Intact without focal deficit.  Normal station and gait observed during ingress and egress from the examination room.  Skin: Without significant lesion.  Musculoskeletal: Unremarkable.    Lab/other results:    NMR reveals a total cholesterol of 98.  Triglycerides 149.  LDL particle number excellent at 456.  Small LDL particle number excellent at 323.  HDL particle number low at 26.9.  CMP normal except glucose 107, ALT elevated at 59.  CBC normal.  Hemoglobin A1c 5.7.  TSH mildly elevated at 4.91.  PSA normal at 0.368.  Vitamin D normal.  Uric acid normal.  CPK normal.    Assessment/Plan:     Diagnosis Plan   1. Routine physical examination     2. Impaired fasting glucose     3. Hyperlipidemia     4. Benign essential hypertension     5. Asymptomatic hyperuricemia     6. Morbid obesity (CMS/HCC)     7. Obstructive sleep apnea, 2008--tolerates CPAP well.     8. Vitamin D deficiency     9. History of nephrolithiasis     10. Multiple environmental allergies     11. Elevated liver " enzymes     12. Subclinical hypothyroidism     13. Therapeutic drug monitoring     14. Need for influenza vaccination  Flucelvax Quad=>4Years ([id number])   15. Colon cancer screening  Ambulatory Referral For Screening Colonoscopy     Patient presents with essentially normal annual except for the following issues: He has mild impaired fasting glucose that does not require medication.  Strongly recommend low carbohydrate diet and weight loss.  Cholesterol profile looks good.  Blood pressure is elevated today and this needs to be reassessed again in the near future.  Hyperuricemia is under good control with the allopurinol and I was hoping that this would help prevent or reduce recurrence of kidney stones.  Patient reports he passed a kidney stone here recently and is currently asymptomatic.  Patient has sleep apnea and tolerates CPAP well.  His vitamin D is in the normal range.  Environmental allergies currently not an issue.  Mildly elevated liver enzymes have been evaluated and are stable.  Patient continues to have evidence of subclinical hypothyroidism and I would have a low threshold for treating him with thyroid supplementation.  However, further evaluation needs to be performed.    Several preventative health issues discussed including review of vaccinations and recommendations, including dietary issues, exercise and weight loss.  Safe sex practices discussed.  Patient advised to wear seatbelt whenever driving and avoid texting and driving.  Also advised to look both ways before crossing the street.  Colon cancer prevention discussed and colonoscopy ordered.  Advised to avoid tobacco products and minimize alcohol consumption.    Plan is as follows: Repeat TSH and order thyroid antibodies today.  Thyroid ultrasound.  I will have patient follow-up in 3 to 4 weeks after the results of the ultrasound are known so that we can reassess his blood pressure as well.  Influenza vaccine given.    Procedures

## 2020-02-10 LAB
THYROGLOB AB SERPL-ACNC: <1 IU/ML (ref 0–0.9)
THYROPEROXIDASE AB SERPL-ACNC: 6 IU/ML (ref 0–34)
TSH SERPL DL<=0.005 MIU/L-ACNC: 3.38 UIU/ML (ref 0.27–4.2)

## 2020-02-13 ENCOUNTER — TELEPHONE (OUTPATIENT)
Dept: INTERNAL MEDICINE | Facility: CLINIC | Age: 50
End: 2020-02-13

## 2020-02-13 NOTE — TELEPHONE ENCOUNTER
Yes, it is necessary.  Thyroid function test tend to fluctuate, particularly early on in the development of hypothyroidism and for completeness we need to do the thyroid ultrasound.

## 2020-02-13 NOTE — TELEPHONE ENCOUNTER
----- Message from Victoriano Rowe sent at 2/12/2020  4:42 PM EST -----  Regarding: Test Results Question  Contact: [phone]  I just received the results of my Thyroid retest from 2/7/20 and it looks like my retest results came back normal. I have a thyroid ultrasound scheduled for Monday. Should I still keep that appointment? Is the ultrasound still necessary?    Thanks.

## 2020-02-17 ENCOUNTER — APPOINTMENT (OUTPATIENT)
Dept: ULTRASOUND IMAGING | Facility: HOSPITAL | Age: 50
End: 2020-02-17

## 2020-03-05 DIAGNOSIS — E55.9 VITAMIN D DEFICIENCY: ICD-10-CM

## 2020-03-09 ENCOUNTER — HOSPITAL ENCOUNTER (OUTPATIENT)
Dept: ULTRASOUND IMAGING | Facility: HOSPITAL | Age: 50
Discharge: HOME | End: 2020-03-09
Admitting: INTERNAL MEDICINE

## 2020-03-09 PROCEDURE — 76536 US EXAM OF HEAD AND NECK: CPT

## 2020-04-04 DIAGNOSIS — E79.0 ASYMPTOMATIC HYPERURICEMIA: Chronic | ICD-10-CM

## 2020-04-06 RX ORDER — ALLOPURINOL 300 MG/1
TABLET ORAL
Qty: 90 TABLET | Refills: 0 | Status: SHIPPED | OUTPATIENT
Start: 2020-04-06 | End: 2020-07-06

## 2020-04-06 RX ORDER — METOPROLOL SUCCINATE 25 MG/1
25 TABLET, EXTENDED RELEASE ORAL DAILY
Qty: 90 TABLET | Refills: 0 | Status: SHIPPED | OUTPATIENT
Start: 2020-04-06 | End: 2020-07-06

## 2020-04-06 RX ORDER — ASPIRIN 81 MG/1
TABLET ORAL
Qty: 90 TABLET | Refills: 0 | Status: SHIPPED | OUTPATIENT
Start: 2020-04-06 | End: 2020-07-06

## 2020-04-06 RX ORDER — EZETIMIBE AND SIMVASTATIN 10; 40 MG/1; MG/1
1 TABLET ORAL NIGHTLY
Qty: 90 TABLET | Refills: 0 | Status: SHIPPED | OUTPATIENT
Start: 2020-04-06 | End: 2020-07-06

## 2020-05-04 DIAGNOSIS — E55.9 VITAMIN D DEFICIENCY: ICD-10-CM

## 2020-06-03 DIAGNOSIS — E55.9 VITAMIN D DEFICIENCY: ICD-10-CM

## 2020-07-03 DIAGNOSIS — E55.9 VITAMIN D DEFICIENCY: ICD-10-CM

## 2020-07-03 DIAGNOSIS — E79.0 ASYMPTOMATIC HYPERURICEMIA: Chronic | ICD-10-CM

## 2020-07-06 RX ORDER — EZETIMIBE AND SIMVASTATIN 10; 40 MG/1; MG/1
1 TABLET ORAL NIGHTLY
Qty: 90 TABLET | Refills: 0 | Status: SHIPPED | OUTPATIENT
Start: 2020-07-06 | End: 2020-09-28

## 2020-07-06 RX ORDER — ASPIRIN 81 MG/1
TABLET ORAL
Qty: 90 TABLET | Refills: 0 | Status: SHIPPED | OUTPATIENT
Start: 2020-07-06 | End: 2020-09-28

## 2020-07-06 RX ORDER — ALLOPURINOL 300 MG/1
TABLET ORAL
Qty: 90 TABLET | Refills: 0 | Status: SHIPPED | OUTPATIENT
Start: 2020-07-06 | End: 2020-09-28

## 2020-07-06 RX ORDER — METOPROLOL SUCCINATE 25 MG/1
25 TABLET, EXTENDED RELEASE ORAL DAILY
Qty: 90 TABLET | Refills: 0 | Status: SHIPPED | OUTPATIENT
Start: 2020-07-06 | End: 2020-09-28

## 2020-08-21 DIAGNOSIS — E55.9 VITAMIN D DEFICIENCY: ICD-10-CM

## 2020-09-26 DIAGNOSIS — E79.0 ASYMPTOMATIC HYPERURICEMIA: Chronic | ICD-10-CM

## 2020-09-28 RX ORDER — ALLOPURINOL 300 MG/1
TABLET ORAL
Qty: 90 TABLET | Refills: 0 | Status: SHIPPED | OUTPATIENT
Start: 2020-09-28 | End: 2020-12-28

## 2020-09-28 RX ORDER — EZETIMIBE AND SIMVASTATIN 10; 40 MG/1; MG/1
1 TABLET ORAL NIGHTLY
Qty: 90 TABLET | Refills: 0 | Status: SHIPPED | OUTPATIENT
Start: 2020-09-28 | End: 2020-12-28

## 2020-09-28 RX ORDER — ASPIRIN 81 MG/1
TABLET ORAL
Qty: 90 TABLET | Refills: 0 | Status: SHIPPED | OUTPATIENT
Start: 2020-09-28 | End: 2020-12-28

## 2020-09-28 RX ORDER — METOPROLOL SUCCINATE 25 MG/1
25 TABLET, EXTENDED RELEASE ORAL DAILY
Qty: 90 TABLET | Refills: 0 | Status: SHIPPED | OUTPATIENT
Start: 2020-09-28 | End: 2020-12-28

## 2020-10-26 DIAGNOSIS — E55.9 VITAMIN D DEFICIENCY: ICD-10-CM

## 2020-10-26 RX ORDER — METHOCARBAMOL 750 MG/1
TABLET ORAL
Qty: 12 CAPSULE | Refills: 1 | Status: SHIPPED | OUTPATIENT
Start: 2020-10-26 | End: 2020-11-25

## 2020-11-25 DIAGNOSIS — E55.9 VITAMIN D DEFICIENCY: ICD-10-CM

## 2020-11-25 RX ORDER — METHOCARBAMOL 750 MG/1
TABLET ORAL
Qty: 12 CAPSULE | Refills: 0 | Status: SHIPPED | OUTPATIENT
Start: 2020-11-25 | End: 2021-02-02

## 2020-12-25 DIAGNOSIS — E79.0 ASYMPTOMATIC HYPERURICEMIA: Chronic | ICD-10-CM

## 2020-12-25 DIAGNOSIS — I10 BENIGN ESSENTIAL HYPERTENSION: Chronic | ICD-10-CM

## 2020-12-25 DIAGNOSIS — E78.2 MIXED HYPERLIPIDEMIA: Primary | Chronic | ICD-10-CM

## 2020-12-28 RX ORDER — ALLOPURINOL 300 MG/1
TABLET ORAL
Qty: 90 TABLET | Refills: 1 | Status: SHIPPED | OUTPATIENT
Start: 2020-12-28 | End: 2021-06-23

## 2020-12-28 RX ORDER — EZETIMIBE AND SIMVASTATIN 10; 40 MG/1; MG/1
TABLET ORAL
Qty: 90 TABLET | Refills: 1 | Status: SHIPPED | OUTPATIENT
Start: 2020-12-28 | End: 2021-06-23

## 2020-12-28 RX ORDER — METOPROLOL SUCCINATE 25 MG/1
25 TABLET, EXTENDED RELEASE ORAL DAILY
Qty: 90 TABLET | Refills: 1 | Status: SHIPPED | OUTPATIENT
Start: 2020-12-28 | End: 2021-04-20

## 2020-12-28 RX ORDER — ASPIRIN 81 MG/1
TABLET ORAL
Qty: 90 TABLET | Refills: 1 | Status: SHIPPED | OUTPATIENT
Start: 2020-12-28 | End: 2021-06-23

## 2021-02-02 ENCOUNTER — TELEPHONE (OUTPATIENT)
Dept: INTERNAL MEDICINE | Facility: CLINIC | Age: 51
End: 2021-02-02

## 2021-02-02 DIAGNOSIS — E55.9 VITAMIN D DEFICIENCY: ICD-10-CM

## 2021-02-02 RX ORDER — METHOCARBAMOL 750 MG/1
TABLET ORAL
Qty: 12 CAPSULE | Refills: 0 | Status: SHIPPED | OUTPATIENT
Start: 2021-02-02 | End: 2021-02-22

## 2021-02-02 NOTE — TELEPHONE ENCOUNTER
Caller: [facility] [place], [place] - [address] [facility] [phone] [facility] [phone] FX    Relationship: Pharmacy    Best call back number: [phone]     Medication needed: D3-50 1.25 MG (79546 UT) capsule      When do you need the refill by: 02/02/2021    What details did the patient provide when requesting the medication: REQUESTING A NEW PRESCRIPTION RUNNING OUT OF REFILLS    Does the patient have less than a 3 day supply:  [] Yes  [] No    What is the patient's preferred pharmacy: [facility] [place], [place] - [address] [facility] - [phone] [facility] [phone] FX<br>[address] [phone] [facility] [phone] FX

## 2021-02-22 DIAGNOSIS — E55.9 VITAMIN D DEFICIENCY: ICD-10-CM

## 2021-03-25 DIAGNOSIS — E55.9 VITAMIN D DEFICIENCY: ICD-10-CM

## 2021-03-26 NOTE — TELEPHONE ENCOUNTER
Caller: [facility] BY [facility], [place] - [address] [facility] [phone] [facility] [phone] FX    Relationship: Pharmacy    Best call back number: [phone]    Medication needed:   Requested Prescriptions     Pending Prescriptions Disp Refills   • cholecalciferol (D3-50) 1.25 MG (61675 UT) capsule 12 capsule 1     Sig: Take 1 capsule by mouth three times a week on Monday, Wednesday, and Friday.       When do you need the refill by: 03/26    What additional details did the patient provide when requesting the medication: PATIENT OUT    Does the patient have less than a 3 day supply:  [x] Yes  [] No    What is the patient's preferred pharmacy: [facility] [place] [address] [address] [facility] [phone] [facility] [phone] FX

## 2021-03-30 ENCOUNTER — BULK ORDERING (OUTPATIENT)
Dept: CASE MANAGEMENT | Facility: OTHER | Age: 51
End: 2021-03-30

## 2021-03-30 DIAGNOSIS — Z23 IMMUNIZATION DUE: ICD-10-CM

## 2021-04-07 ENCOUNTER — LAB (OUTPATIENT)
Dept: LAB | Facility: HOSPITAL | Age: 51
End: 2021-04-07

## 2021-04-07 DIAGNOSIS — E55.9 VITAMIN D DEFICIENCY: Chronic | ICD-10-CM

## 2021-04-07 DIAGNOSIS — E03.8 SUBCLINICAL HYPOTHYROIDISM: Chronic | ICD-10-CM

## 2021-04-07 DIAGNOSIS — R74.8 ELEVATED LIVER ENZYMES: Chronic | ICD-10-CM

## 2021-04-07 DIAGNOSIS — E78.2 MIXED HYPERLIPIDEMIA: Chronic | ICD-10-CM

## 2021-04-07 DIAGNOSIS — E29.1 HYPOGONADISM MALE: Chronic | ICD-10-CM

## 2021-04-07 DIAGNOSIS — R73.01 IMPAIRED FASTING GLUCOSE: Chronic | ICD-10-CM

## 2021-04-07 DIAGNOSIS — I10 BENIGN ESSENTIAL HYPERTENSION: Primary | Chronic | ICD-10-CM

## 2021-04-07 DIAGNOSIS — Z51.81 THERAPEUTIC DRUG MONITORING: ICD-10-CM

## 2021-04-07 DIAGNOSIS — Z00.00 ROUTINE PHYSICAL EXAMINATION: ICD-10-CM

## 2021-04-07 LAB
25(OH)D3 SERPL-MCNC: 74.8 NG/ML (ref 30–100)
ALBUMIN SERPL-MCNC: 4.4 G/DL (ref 3.5–5.2)
ALBUMIN/GLOB SERPL: 1.5 G/DL
ALP SERPL-CCNC: 67 U/L (ref 39–117)
ALT SERPL W P-5'-P-CCNC: 71 U/L (ref 1–41)
ANION GAP SERPL CALCULATED.3IONS-SCNC: 11.7 MMOL/L (ref 5–15)
AST SERPL-CCNC: 39 U/L (ref 1–40)
BILIRUB SERPL-MCNC: 0.4 MG/DL (ref 0–1.2)
BUN SERPL-MCNC: 11 MG/DL (ref 6–20)
BUN/CREAT SERPL: 11.5 (ref 7–25)
CALCIUM SPEC-SCNC: 9.6 MG/DL (ref 8.6–10.5)
CHLORIDE SERPL-SCNC: 102 MMOL/L (ref 98–107)
CK SERPL-CCNC: 125 U/L (ref 20–200)
CO2 SERPL-SCNC: 28.3 MMOL/L (ref 22–29)
CREAT SERPL-MCNC: 0.96 MG/DL (ref 0.76–1.27)
DEPRECATED RDW RBC AUTO: 41.7 FL (ref 37–54)
ERYTHROCYTE [DISTWIDTH] IN BLOOD BY AUTOMATED COUNT: 13.1 % (ref 12.3–15.4)
GFR SERPL CREATININE-BSD FRML MDRD: 83 ML/MIN/1.73
GLOBULIN UR ELPH-MCNC: 2.9 GM/DL
GLUCOSE SERPL-MCNC: 134 MG/DL (ref 65–99)
HBA1C MFR BLD: 5.93 % (ref 4.8–5.6)
HCT VFR BLD AUTO: 44.1 % (ref 37.5–51)
HGB BLD-MCNC: 15 G/DL (ref 13–17.7)
MCH RBC QN AUTO: 29.7 PG (ref 26.6–33)
MCHC RBC AUTO-ENTMCNC: 34 G/DL (ref 31.5–35.7)
MCV RBC AUTO: 87.3 FL (ref 79–97)
PLATELET # BLD AUTO: 176 10*3/MM3 (ref 140–450)
PMV BLD AUTO: 10.7 FL (ref 6–12)
POTASSIUM SERPL-SCNC: 4.4 MMOL/L (ref 3.5–5.2)
PROT SERPL-MCNC: 7.3 G/DL (ref 6–8.5)
PSA SERPL-MCNC: 0.35 NG/ML (ref 0–4)
RBC # BLD AUTO: 5.05 10*6/MM3 (ref 4.14–5.8)
SODIUM SERPL-SCNC: 142 MMOL/L (ref 136–145)
T3FREE SERPL-MCNC: 3.15 PG/ML (ref 2–4.4)
T4 FREE SERPL-MCNC: 1.07 NG/DL (ref 0.93–1.7)
TSH SERPL DL<=0.05 MIU/L-ACNC: 4 UIU/ML (ref 0.27–4.2)
WBC # BLD AUTO: 6.45 10*3/MM3 (ref 3.4–10.8)

## 2021-04-07 PROCEDURE — 83036 HEMOGLOBIN GLYCOSYLATED A1C: CPT | Performed by: INTERNAL MEDICINE

## 2021-04-07 PROCEDURE — 84153 ASSAY OF PSA TOTAL: CPT | Performed by: INTERNAL MEDICINE

## 2021-04-07 PROCEDURE — 80061 LIPID PANEL: CPT | Performed by: INTERNAL MEDICINE

## 2021-04-07 PROCEDURE — 84481 FREE ASSAY (FT-3): CPT | Performed by: INTERNAL MEDICINE

## 2021-04-07 PROCEDURE — 80053 COMPREHEN METABOLIC PANEL: CPT | Performed by: INTERNAL MEDICINE

## 2021-04-07 PROCEDURE — 84443 ASSAY THYROID STIM HORMONE: CPT | Performed by: INTERNAL MEDICINE

## 2021-04-07 PROCEDURE — 84410 TESTOSTERONE BIOAVAILABLE: CPT | Performed by: INTERNAL MEDICINE

## 2021-04-07 PROCEDURE — 83704 LIPOPROTEIN BLD QUAN PART: CPT | Performed by: INTERNAL MEDICINE

## 2021-04-07 PROCEDURE — 36415 COLL VENOUS BLD VENIPUNCTURE: CPT | Performed by: INTERNAL MEDICINE

## 2021-04-07 PROCEDURE — 82550 ASSAY OF CK (CPK): CPT | Performed by: INTERNAL MEDICINE

## 2021-04-07 PROCEDURE — 82306 VITAMIN D 25 HYDROXY: CPT | Performed by: INTERNAL MEDICINE

## 2021-04-07 PROCEDURE — 85027 COMPLETE CBC AUTOMATED: CPT | Performed by: INTERNAL MEDICINE

## 2021-04-07 PROCEDURE — 84439 ASSAY OF FREE THYROXINE: CPT | Performed by: INTERNAL MEDICINE

## 2021-04-09 LAB
CHOLEST SERPL-MCNC: 132 MG/DL (ref 100–199)
HDL SERPL-SCNC: 35 UMOL/L
HDLC SERPL-MCNC: 33 MG/DL
LDL SERPL QN: 19.7 NM
LDL SERPL-SCNC: 576 NMOL/L
LDL SMALL SERPL-SCNC: 482 NMOL/L
LDLC SERPL CALC-MCNC: 54 MG/DL (ref 0–99)
TESTOST SERPL-MCNC: 193 NG/DL (ref 264–916)
TESTOSTERONE.FREE+WB MFR SERPL: 31.3 % (ref 9–46)
TESTOSTERONE.FREE+WB SERPL-MCNC: 60.4 NG/DL (ref 40–250)
TRIGL SERPL-MCNC: 291 MG/DL (ref 0–149)

## 2021-04-20 ENCOUNTER — OFFICE VISIT (OUTPATIENT)
Dept: INTERNAL MEDICINE | Facility: CLINIC | Age: 51
End: 2021-04-20

## 2021-04-20 VITALS
HEART RATE: 95 BPM | SYSTOLIC BLOOD PRESSURE: 132 MMHG | OXYGEN SATURATION: 98 % | WEIGHT: 315 LBS | BODY MASS INDEX: 41.75 KG/M2 | RESPIRATION RATE: 16 BRPM | HEIGHT: 73 IN | TEMPERATURE: 97.4 F | DIASTOLIC BLOOD PRESSURE: 84 MMHG

## 2021-04-20 DIAGNOSIS — E66.01 MORBID OBESITY (HCC): Chronic | ICD-10-CM

## 2021-04-20 DIAGNOSIS — G47.33 OBSTRUCTIVE SLEEP APNEA: Chronic | ICD-10-CM

## 2021-04-20 DIAGNOSIS — E03.8 SUBCLINICAL HYPOTHYROIDISM: Chronic | ICD-10-CM

## 2021-04-20 DIAGNOSIS — E78.2 MIXED HYPERLIPIDEMIA: Chronic | ICD-10-CM

## 2021-04-20 DIAGNOSIS — Z87.442 HISTORY OF NEPHROLITHIASIS: Chronic | ICD-10-CM

## 2021-04-20 DIAGNOSIS — R73.01 IMPAIRED FASTING GLUCOSE: Chronic | ICD-10-CM

## 2021-04-20 DIAGNOSIS — E79.0 ASYMPTOMATIC HYPERURICEMIA: Chronic | ICD-10-CM

## 2021-04-20 DIAGNOSIS — I10 BENIGN ESSENTIAL HYPERTENSION: Chronic | ICD-10-CM

## 2021-04-20 DIAGNOSIS — Z51.81 THERAPEUTIC DRUG MONITORING: ICD-10-CM

## 2021-04-20 DIAGNOSIS — Z12.11 COLON CANCER SCREENING: ICD-10-CM

## 2021-04-20 DIAGNOSIS — G89.29 CHRONIC BILATERAL LOW BACK PAIN WITHOUT SCIATICA: Chronic | ICD-10-CM

## 2021-04-20 DIAGNOSIS — Z91.09 MULTIPLE ENVIRONMENTAL ALLERGIES: Chronic | ICD-10-CM

## 2021-04-20 DIAGNOSIS — Z00.00 ROUTINE PHYSICAL EXAMINATION: Primary | ICD-10-CM

## 2021-04-20 DIAGNOSIS — R74.8 ELEVATED LIVER ENZYMES: Chronic | ICD-10-CM

## 2021-04-20 DIAGNOSIS — E55.9 VITAMIN D DEFICIENCY: Chronic | ICD-10-CM

## 2021-04-20 DIAGNOSIS — M54.50 CHRONIC BILATERAL LOW BACK PAIN WITHOUT SCIATICA: Chronic | ICD-10-CM

## 2021-04-20 PROCEDURE — 99396 PREV VISIT EST AGE 40-64: CPT | Performed by: INTERNAL MEDICINE

## 2021-04-20 RX ORDER — METOPROLOL SUCCINATE 50 MG/1
TABLET, EXTENDED RELEASE ORAL
Qty: 90 TABLET | Refills: 3 | Status: SHIPPED | OUTPATIENT
Start: 2021-04-20 | End: 2021-08-05

## 2021-04-20 NOTE — PROGRESS NOTES
04/20/2021    Patient Information  Victoriano Rowe                                                                                          [id number] [place] [address]      1970  [unfilled]  [phone] (work)    Chief Complaint:     Routine annual physical examination and follow-up blood work.  No new acute complaints.    History of Present Illness:    Patient with a history of impaired fasting glucose, hyperlipidemia, hypertension, hyperuricemia, chronic lower back pain, morbid obesity, sleep apnea, vitamin D deficiency, history of kidney stones, environmental allergies, elevated liver enzymes, subclinical hypothyroidism.  He presents today for his routine annual physical exam and follow-up lab work.  His past medical history reviewed and updated were necessary including health maintenance parameters.  This reveals he needs a colonoscopy and he also needs COVID-19 vaccine as well as the new Shingrix vaccine.  I encouraged him to check with his insurance company regarding coverage of the Shingrix vaccine.  Also explained to him he should not let this delay him getting COVID-19 vaccine.    Review of Systems   Constitutional: Negative.   HENT: Negative.    Eyes: Negative.    Cardiovascular: Negative.    Respiratory: Negative.    Endocrine: Negative.    Hematologic/Lymphatic: Negative.    Skin: Negative.    Musculoskeletal: Positive for arthritis and back pain.   Gastrointestinal: Negative.    Genitourinary: Negative.    Neurological: Negative.    Psychiatric/Behavioral: Negative.    Allergic/Immunologic: Negative.        Active Problems:    Patient Active Problem List   Diagnosis   • Allergic rhinitis   • Asymptomatic hyperuricemia   • Benign essential hypertension   • Chronic bilateral low back pain without sciatica   • Hyperlipidemia   • Borderline Hypogonadism male   • Impaired fasting glucose   • Male erectile disorder   • Morbid obesity (CMS/HCC)   • Obstructive sleep apnea,  2008--tolerates CPAP well.   • Vitamin D deficiency   • History of nephrolithiasis   • Therapeutic drug monitoring   • Routine physical examination   • Multiple environmental allergies   • Elevated liver enzymes   • Subclinical hypothyroidism         Past Medical History:   Diagnosis Date   • Allergic rhinitis 1/22/2016    Patient has never had allergy testing.  Symptoms are seasonal.   • Asymptomatic hyperuricemia 8/25/2011 08/08/2015--patient seen in follow-up tolerated the lower well.  Uric acid upper limit of normal at 6.7.  Uloric increased to 80 mg per day.  06/08/2015--uric acid not at goal.  Started on Uloric 40 mg per day.  08/25/2011--patient was noted to have elevated uric acid but has never had an episode of gout.  Allopurinol was initiated in hopes that this would reduce kidney stones.   • Benign essential hypertension 5/28/2008 02/16/2016--patient has lost about 60 pounds and his blood pressure is running low on half a dose of Benicar HCT 40/25.  Blood pressure medications discontinued except for metoprolol.  05/28/2008--treatment for hypertension begun.   • Borderline Hypogonadism male 8/25/2011 03/21/2017--patient seen in follow-up and he feels well and does not feel any different off of TRT.  His total testosterone is borderline low at 347.  His free and weakly bound testosterone is in the normal range at 77.0.  Patient needs to stay off of testosterone replacement therapy and we will continue to monitor.  06/30/2016--patient seen for a routine physical examination.  He reports that TRT   • Chronic bilateral low back pain without sciatica 6/19/2010    10/17/2014--patient presents with a five-day history of low back pain without radiation into the lower extremities.  No known trauma or injury.  He was making up the bed when this occurred.  No bowel or bladder symptoms.  Patient has a known history of herniated nucleus pulposus and bulging disks.  He has been taking some generic Aleve and  this has not really helped much.  He had some leftover hyd   • Elevated liver enzymes 7/27/2017 02/06/2019--routine physical.  AST normal at 28, ALT mildly elevated at 60.  Suspect early PAGE despite normal liver ultrasound.  07/27/2017--routine physical.  AST elevated at 52, ALT elevated at 97.  Hepatitis C antibody screening and hepatitis B surface antigen ordered.  Repeat CMP ordered.  Ultrasound liver.  Hepatitis C and hepatitis B returned negative.  Repeat CMP showed improved liver enzy   • History of nephrolithiasis 7/19/2011 08/25/2011-- allopurinol initiated for hyperuricemia.  07/19/2011--patient presented with right flank pain with radiation into the right back. CT scan of the abdomen revealed a 4 mm stone in the dependent aspect of the urinary bladder.  Likely recent passage from the right ureter where there is mild right hydronephrosis.  1.6 cm nonobstructing stone at the lower pole of the left kidney and a punctate nonobstructing stone at the upper pole of the left kidney.  Patient passed the stone spontaneously.  Calcium oxalate.   • Hyperlipidemia 9/28/2008 09/28/2008--treatment for hyperlipidemia begun   • Impaired fasting glucose 4/1/2011 04/01/2011--initial diagnosis of impaired fasting glucose.  Initial hemoglobin A1c normal at 5.5.  A few months later 5.7.  Has never been significantly elevated.   • Male erectile disorder 1/22/2016   • Morbid obesity (CMS/HCC) 1/22/2016   • Multiple environmental allergies 1/22/2016    Patient has never had allergy testing.  Symptoms are seasonal.   • Obstructive sleep apnea, 2008--tolerates CPAP well. 1/22/2016    Diagnosed in spring of 2008.  Tolerates CPAP well.   • Subclinical hypothyroidism 2/6/2019 02/06/2019--routine physical.  TSH is slightly elevated at 4.53.  Free T3 and free T4 are normal.  Close observation.   • Vitamin D deficiency 1/22/2016         Past Surgical History:   Procedure Laterality Date   • KIDNEY SURGERY Left     Age 6 or  "7.  Patient had some sort of swelling of his left kidney, possibly a cyst.  Surgical resection.  No further problems.         Allergies   Allergen Reactions   • Penicillins            Current Outpatient Medications:   •  allopurinol (ZYLOPRIM) 300 MG tablet, Take 1 tablet by mouth daily for gout., Disp: 90 tablet, Rfl: 1  •  Aspirin Adult Low Strength 81 MG EC tablet, Take 1 tablet by mouth daily., Disp: 90 tablet, Rfl: 1  •  cholecalciferol (D3-50) 1.25 MG (35824 UT) capsule, Take 1 capsule by mouth three times a week on Monday, Wednesday, and Friday., Disp: 12 capsule, Rfl: 1  •  ezetimibe-simvastatin (VYTORIN) 10-40 MG per tablet, Take 1 tablet by mouth nightly., Disp: 90 tablet, Rfl: 1  •  Multiple Vitamins tablet, Take 1 tablet by mouth daily., Disp: , Rfl:   •  sildenafil (VIAGRA) 100 MG tablet, Take 1 tablet by mouth Daily As Needed for erectile dysfunction., Disp: 5 tablet, Rfl: 5  •  metoprolol succinate XL (Toprol XL) 50 MG 24 hr tablet, Take 1 p.o. every morning for high blood pressure, Disp: 90 tablet, Rfl: 3      Family History   Problem Relation Age of Onset   • Alcohol abuse Father    • Heart failure Father    • Emphysema Father    • Diabetes Father    • Diabetes Sister          Social History     Socioeconomic History   • Marital status:      Spouse name: Not on file   • Number of children: 0   • Years of education: Not on file   • Highest education level: Some college, no degree   Tobacco Use   • Smoking status: Never Smoker   • Smokeless tobacco: Never Used   Vaping Use   • Vaping Use: Never used   Substance and Sexual Activity   • Alcohol use: Yes     Comment: Social alcohol consumption   • Drug use: No   • Sexual activity: Yes     Partners: Female         Vitals:    04/20/21 0928   BP: 132/84   Pulse: 95   Resp: 16   Temp: 97.4 °F (36.3 °C)   SpO2: 98%   Weight: (!) 163 kg (360 lb)   Height: 185.4 cm (73\")        Body mass index is 47.5 kg/m².      Physical Exam:    General: Alert and " oriented x 3.  No acute distress.  Normal affect.  Morbidly obese.  HEENT: Pupils equal, round, reactive to light; extraocular movements intact; sclerae nonicteric; pharynx, ear canals and TMs normal.  Neck: Without JVD, thyromegaly, bruit, or adenopathy.  Lungs: Clear to auscultation in all fields.  Heart: Regular rate and rhythm without murmur, rub, gallop, or click.  Abdomen: Soft, nontender, without hepatosplenomegaly or hernia.  Bowel sounds normal.  : Deferred.  Rectal: Deferred.  Extremities: Without clubbing, cyanosis, edema, or pulse deficit.  Neurologic: Intact without focal deficit.  Normal station and gait observed during ingress and egress from the examination room.  Skin: Without significant lesion.  Musculoskeletal: Unremarkable.    Lab/other results:    NMR reveals a total cholesterol of 132.  Triglycerides elevated at 291.  LDL particle number excellent 576.  Small LDL particle number excellent at 482.  HDL particle number normal at 35.0.  CMP normal except glucose 134, ALT elevated at 71.  Hemoglobin A1c 5.93.  Vitamin D normal at 74.8.  Thyroid function test normal.  Total testosterone is low at 193.  Free and weakly bound testosterone normal at 60.4.  PSA normal at 0.347.  CPK normal.  CBC normal.    Assessment/Plan:     Diagnosis Plan   1. Routine physical examination     2. Impaired fasting glucose  Comprehensive Metabolic Panel    Hemoglobin A1c   3. Hyperlipidemia  CK    Comprehensive Metabolic Panel    NMR LipoProfile   4. Benign essential hypertension  metoprolol succinate XL (Toprol XL) 50 MG 24 hr tablet   5. Asymptomatic hyperuricemia  Uric Acid   6. Chronic bilateral low back pain without sciatica     7. Morbid obesity (CMS/HCC)     8. Obstructive sleep apnea, 2008--tolerates CPAP well.     9. Vitamin D deficiency  Vitamin D 25 Hydroxy   10. History of nephrolithiasis     11. Multiple environmental allergies     12. Elevated liver enzymes     13. Subclinical hypothyroidism  TSH    T4,  Free    T3, Free   14. Therapeutic drug monitoring     15. Colon cancer screening  Ambulatory Referral For Screening Colonoscopy     Patient presents with a normal annual physical except for several issues: He has impaired fasting glucose and due to his morbid obesity he is at risk for development of overt diabetes.  I have strongly recommended low carbohydrate diet, exercise, and weight loss.  Hyperlipidemia is under reasonable control with Vytorin.  Blood pressure has been controlled with metoprolol.  He has asymptomatic hyperuricemia which we are treating with allopurinol and he is tolerating it well.  He has intermittent chronic lower back pain and his obesity is definitely playing a role.  He has sleep apnea and tolerates CPAP well.  Vitamin D is in the normal range.  Patient has a history of kidney stones remotely but none recent.  Environmental allergies currently not an issue.  There is no evidence of subclinical hypothyroidism but we will continue to monitor.    Several preventative health issues discussed including review of vaccinations and recommendations, including dietary issues, exercise and weight loss.  Safe sex practices discussed.  Patient advised to wear seatbelt whenever driving and avoid texting and driving.  Also advised to look both ways before crossing the street.  Colon cancer screening discussed with patient at length..  Advised to avoid tobacco products and minimize alcohol consumption.    Plan is as follows: Colonoscopy ordered.  Strongly recommend low carbohydrate diet, exercise, and weight loss.  Patient will follow-up in 6 months with lab prior or follow-up as needed.  I have recommended that he check with his insurance company regarding the Shingrix vaccine.  He should not take this vaccine with any other vaccines including Covid.    Addendum: Blood pressure is little elevated and therefore I will increase his metoprolol to 50 mg/day and have him follow-up in about 4 weeks to  reassess blood pressure.    Procedures

## 2021-04-24 DIAGNOSIS — E55.9 VITAMIN D DEFICIENCY: ICD-10-CM

## 2021-05-25 ENCOUNTER — OFFICE VISIT (OUTPATIENT)
Dept: INTERNAL MEDICINE | Facility: CLINIC | Age: 51
End: 2021-05-25

## 2021-05-25 VITALS
OXYGEN SATURATION: 98 % | WEIGHT: 315 LBS | BODY MASS INDEX: 41.75 KG/M2 | SYSTOLIC BLOOD PRESSURE: 132 MMHG | DIASTOLIC BLOOD PRESSURE: 80 MMHG | HEART RATE: 78 BPM | HEIGHT: 73 IN

## 2021-05-25 DIAGNOSIS — I10 BENIGN ESSENTIAL HYPERTENSION: Primary | Chronic | ICD-10-CM

## 2021-05-25 PROCEDURE — 99213 OFFICE O/P EST LOW 20 MIN: CPT | Performed by: INTERNAL MEDICINE

## 2021-05-25 NOTE — PROGRESS NOTES
05/25/2021    Patient Information  Victoriano Rowe                                                                                          [id number] [place] [address]      1970  [unfilled]  [phone] (work)    Chief Complaint:     Follow-up hypertension.    History of Present Illness:    Patient with a history of hypertension, hyperlipidemia, impaired fasting glucose, sleep apnea, history of kidney stones, subclinical hypothyroidism.  He presents today to follow-up on his blood pressure after we increased his metoprolol succinate from 25 mg/day to 50 mg/day.  His blood pressure was little elevated at his recent physical a few weeks ago.  He seemed to tolerate this well.  Past medical history reviewed and updated were necessary including health maintenance parameters.  We ordered a colonoscopy recently and patient has the paperwork now.  That is in the process of being scheduled.    Review of Systems   Constitutional: Negative. Negative for malaise/fatigue.   HENT: Negative.    Eyes: Negative.  Negative for blurred vision.   Cardiovascular: Negative.  Negative for chest pain, orthopnea, palpitations and paroxysmal nocturnal dyspnea.   Respiratory: Negative.  Negative for shortness of breath.    Endocrine: Negative.    Hematologic/Lymphatic: Negative.    Skin: Negative.    Musculoskeletal: Negative.  Negative for neck pain.   Gastrointestinal: Negative.    Genitourinary: Negative.    Neurological: Negative.  Negative for headaches.   Psychiatric/Behavioral: Negative.    Allergic/Immunologic: Negative.        Active Problems:    Patient Active Problem List   Diagnosis   • Allergic rhinitis   • Asymptomatic hyperuricemia   • Benign essential hypertension   • Chronic bilateral low back pain without sciatica   • Hyperlipidemia   • Borderline Hypogonadism male   • Impaired fasting glucose   • Male erectile disorder   • Morbid obesity (CMS/HCC)   • Obstructive sleep apnea,  2008--tolerates CPAP well.   • Vitamin D deficiency   • History of nephrolithiasis   • Therapeutic drug monitoring   • Routine physical examination   • Multiple environmental allergies   • Elevated liver enzymes   • Subclinical hypothyroidism         Past Medical History:   Diagnosis Date   • Allergic rhinitis 1/22/2016    Patient has never had allergy testing.  Symptoms are seasonal.   • Asymptomatic hyperuricemia 8/25/2011 08/08/2015--patient seen in follow-up tolerated the lower well.  Uric acid upper limit of normal at 6.7.  Uloric increased to 80 mg per day.  06/08/2015--uric acid not at goal.  Started on Uloric 40 mg per day.  08/25/2011--patient was noted to have elevated uric acid but has never had an episode of gout.  Allopurinol was initiated in hopes that this would reduce kidney stones.   • Benign essential hypertension 5/28/2008 02/16/2016--patient has lost about 60 pounds and his blood pressure is running low on half a dose of Benicar HCT 40/25.  Blood pressure medications discontinued except for metoprolol.  05/28/2008--treatment for hypertension begun.   • Borderline Hypogonadism male 8/25/2011 03/21/2017--patient seen in follow-up and he feels well and does not feel any different off of TRT.  His total testosterone is borderline low at 347.  His free and weakly bound testosterone is in the normal range at 77.0.  Patient needs to stay off of testosterone replacement therapy and we will continue to monitor.  06/30/2016--patient seen for a routine physical examination.  He reports that TRT   • Chronic bilateral low back pain without sciatica 6/19/2010    10/17/2014--patient presents with a five-day history of low back pain without radiation into the lower extremities.  No known trauma or injury.  He was making up the bed when this occurred.  No bowel or bladder symptoms.  Patient has a known history of herniated nucleus pulposus and bulging disks.  He has been taking some generic Aleve and  this has not really helped much.  He had some leftover hyd   • Elevated liver enzymes 7/27/2017 02/06/2019--routine physical.  AST normal at 28, ALT mildly elevated at 60.  Suspect early PAGE despite normal liver ultrasound.  07/27/2017--routine physical.  AST elevated at 52, ALT elevated at 97.  Hepatitis C antibody screening and hepatitis B surface antigen ordered.  Repeat CMP ordered.  Ultrasound liver.  Hepatitis C and hepatitis B returned negative.  Repeat CMP showed improved liver enzy   • History of nephrolithiasis 7/19/2011 08/25/2011-- allopurinol initiated for hyperuricemia.  07/19/2011--patient presented with right flank pain with radiation into the right back. CT scan of the abdomen revealed a 4 mm stone in the dependent aspect of the urinary bladder.  Likely recent passage from the right ureter where there is mild right hydronephrosis.  1.6 cm nonobstructing stone at the lower pole of the left kidney and a punctate nonobstructing stone at the upper pole of the left kidney.  Patient passed the stone spontaneously.  Calcium oxalate.   • Hyperlipidemia 9/28/2008 09/28/2008--treatment for hyperlipidemia begun   • Impaired fasting glucose 4/1/2011 04/01/2011--initial diagnosis of impaired fasting glucose.  Initial hemoglobin A1c normal at 5.5.  A few months later 5.7.  Has never been significantly elevated.   • Male erectile disorder 1/22/2016   • Morbid obesity (CMS/HCC) 1/22/2016   • Multiple environmental allergies 1/22/2016    Patient has never had allergy testing.  Symptoms are seasonal.   • Obstructive sleep apnea, 2008--tolerates CPAP well. 1/22/2016    Diagnosed in spring of 2008.  Tolerates CPAP well.   • Subclinical hypothyroidism 2/6/2019 02/06/2019--routine physical.  TSH is slightly elevated at 4.53.  Free T3 and free T4 are normal.  Close observation.   • Vitamin D deficiency 1/22/2016         Past Surgical History:   Procedure Laterality Date   • KIDNEY SURGERY Left     Age [age].  Patient had some sort of swelling of his left kidney, possibly a cyst.  Surgical resection.  No further problems.         Allergies   Allergen Reactions   • Penicillins            Current Outpatient Medications:   •  allopurinol (ZYLOPRIM) 300 MG tablet, Take 1 tablet by mouth daily for gout., Disp: 90 tablet, Rfl: 1  •  Aspirin Adult Low Strength 81 MG EC tablet, Take 1 tablet by mouth daily., Disp: 90 tablet, Rfl: 1  •  cholecalciferol (D3-50) 1.25 MG (42031 UT) capsule, Take 1 capsule by mouth three times a week on Monday, Wednesday, and Friday., Disp: 12 capsule, Rfl: 0  •  ezetimibe-simvastatin (VYTORIN) 10-40 MG per tablet, Take 1 tablet by mouth nightly., Disp: 90 tablet, Rfl: 1  •  metoprolol succinate XL (Toprol XL) 50 MG 24 hr tablet, Take 1 p.o. every morning for high blood pressure, Disp: 90 tablet, Rfl: 3  •  Multiple Vitamins tablet, Take 1 tablet by mouth daily., Disp: , Rfl:   •  sildenafil (VIAGRA) 100 MG tablet, Take 1 tablet by mouth Daily As Needed for erectile dysfunction., Disp: 5 tablet, Rfl: 5      Family History   Problem Relation Age of Onset   • Alcohol abuse Father    • Heart failure Father    • Emphysema Father    • Diabetes Father    • Diabetes Sister          Social History     Socioeconomic History   • Marital status:      Spouse name: Not on file   • Number of children: 0   • Years of education: Not on file   • Highest education level: Some college, no degree   Tobacco Use   • Smoking status: Never Smoker   • Smokeless tobacco: Never Used   Vaping Use   • Vaping Use: Never used   Substance and Sexual Activity   • Alcohol use: Yes     Comment: Social alcohol consumption   • Drug use: No   • Sexual activity: Yes     Partners: Female         There were no vitals filed for this visit.     There is no height or weight on file to calculate BMI.      Physical Exam:    General: Alert and oriented x 3.  No acute distress.  Normal affect. Obese. HEENT: Pupils equal, round, reactive  to light; extraocular movements intact; sclerae nonicteric; pharynx, ear canals and TMs normal.  Neck: Without JVD, thyromegaly, bruit, or adenopathy.  Lungs: Clear to auscultation in all fields.  Heart: Regular rate and rhythm without murmur, rub, gallop, or click.  Abdomen: Soft, nontender, without hepatosplenomegaly or hernia.  Bowel sounds normal.  : Deferred.  Rectal: Deferred.  Extremities: Without clubbing, cyanosis, edema, or pulse deficit.  Neurologic: Intact without focal deficit.  Normal station and gait observed during ingress and egress from the examination room.  Skin: Without significant lesion.  Musculoskeletal: Unremarkable.    Lab/other results:      Assessment/Plan:     Diagnosis Plan   1. Benign essential hypertension       Patient blood pressure is under adequate control after increasing his metoprolol.  He is tolerating it well.  Patient has several other medical issues as noted above which have been fairly stable.  Have strongly recommended low carbohydrate diet and weight loss.    Plan is as follows: No change in current medical regimen.  Patient will follow-up in about 5 months with lab 1 week prior to reassess.        Procedures

## 2021-06-04 DIAGNOSIS — E55.9 VITAMIN D DEFICIENCY: ICD-10-CM

## 2021-06-23 DIAGNOSIS — E78.2 MIXED HYPERLIPIDEMIA: Chronic | ICD-10-CM

## 2021-06-23 DIAGNOSIS — E79.0 ASYMPTOMATIC HYPERURICEMIA: Chronic | ICD-10-CM

## 2021-06-23 RX ORDER — ALLOPURINOL 300 MG/1
TABLET ORAL
Qty: 90 TABLET | Refills: 1 | Status: SHIPPED | OUTPATIENT
Start: 2021-06-23 | End: 2021-12-23

## 2021-06-23 RX ORDER — ASPIRIN 81 MG/1
TABLET, COATED ORAL
Qty: 90 TABLET | Refills: 1 | Status: SHIPPED | OUTPATIENT
Start: 2021-06-23 | End: 2021-12-23

## 2021-06-23 RX ORDER — EZETIMIBE AND SIMVASTATIN 10; 40 MG/1; MG/1
TABLET ORAL
Qty: 90 TABLET | Refills: 1 | Status: SHIPPED | OUTPATIENT
Start: 2021-06-23 | End: 2021-12-23

## 2021-08-05 ENCOUNTER — HOSPITAL ENCOUNTER (OUTPATIENT)
Dept: GENERAL RADIOLOGY | Facility: HOSPITAL | Age: 51
Discharge: HOME | End: 2021-08-05
Admitting: INTERNAL MEDICINE

## 2021-08-05 ENCOUNTER — OFFICE VISIT (OUTPATIENT)
Dept: INTERNAL MEDICINE | Facility: CLINIC | Age: 51
End: 2021-08-05

## 2021-08-05 VITALS
SYSTOLIC BLOOD PRESSURE: 130 MMHG | HEIGHT: 73 IN | DIASTOLIC BLOOD PRESSURE: 70 MMHG | WEIGHT: 315 LBS | OXYGEN SATURATION: 97 % | RESPIRATION RATE: 16 BRPM | BODY MASS INDEX: 41.75 KG/M2 | HEART RATE: 105 BPM

## 2021-08-05 DIAGNOSIS — E55.9 VITAMIN D DEFICIENCY: Chronic | ICD-10-CM

## 2021-08-05 DIAGNOSIS — E66.01 MORBID OBESITY (HCC): Chronic | ICD-10-CM

## 2021-08-05 DIAGNOSIS — M25.562 ACUTE PAIN OF LEFT KNEE: ICD-10-CM

## 2021-08-05 DIAGNOSIS — I10 BENIGN ESSENTIAL HYPERTENSION: Chronic | ICD-10-CM

## 2021-08-05 DIAGNOSIS — Z51.81 THERAPEUTIC DRUG MONITORING: ICD-10-CM

## 2021-08-05 DIAGNOSIS — E79.0 ASYMPTOMATIC HYPERURICEMIA: Chronic | ICD-10-CM

## 2021-08-05 DIAGNOSIS — R74.8 ELEVATED LIVER ENZYMES: Chronic | ICD-10-CM

## 2021-08-05 DIAGNOSIS — R41.3 MEMORY LOSS OF UNKNOWN CAUSE: ICD-10-CM

## 2021-08-05 DIAGNOSIS — N52.9 MALE ERECTILE DISORDER: Chronic | ICD-10-CM

## 2021-08-05 DIAGNOSIS — E29.1 HYPOGONADISM MALE: Chronic | ICD-10-CM

## 2021-08-05 DIAGNOSIS — Z12.11 COLON CANCER SCREENING: ICD-10-CM

## 2021-08-05 DIAGNOSIS — R73.01 IMPAIRED FASTING GLUCOSE: Chronic | ICD-10-CM

## 2021-08-05 DIAGNOSIS — E03.8 SUBCLINICAL HYPOTHYROIDISM: Chronic | ICD-10-CM

## 2021-08-05 DIAGNOSIS — R51.9 NEW ONSET OF HEADACHES AFTER AGE 50: Primary | ICD-10-CM

## 2021-08-05 DIAGNOSIS — E78.2 MIXED HYPERLIPIDEMIA: Chronic | ICD-10-CM

## 2021-08-05 PROCEDURE — 73562 X-RAY EXAM OF KNEE 3: CPT

## 2021-08-05 PROCEDURE — 99214 OFFICE O/P EST MOD 30 MIN: CPT | Performed by: INTERNAL MEDICINE

## 2021-08-05 RX ORDER — METOPROLOL SUCCINATE 25 MG/1
TABLET, EXTENDED RELEASE ORAL
Qty: 30 TABLET | Refills: 11 | Status: SHIPPED | OUTPATIENT
Start: 2021-08-05 | End: 2022-06-21

## 2021-08-05 NOTE — PROGRESS NOTES
08/05/2021    Patient Information  Victoriano Rowe                                                                                          [id number] [address] [place] [address]      1970  [unfilled]  [phone] (work)    Chief Complaint:     Complaining of possible side effects from increase metoprolol.  Complaining of problems with memory.    History of Present Illness:    Patient with a history of impaired fasting glucose, hyperlipidemia, sleep apnea, history of kidney stones, environmental allergies, subclinical hypothyroidism, hyperuricemia and hypertension.  He presents today with problems with memory as well as headaches as described below.  Past medical history reviewed and updated were necessary including health maintenance parameters.  This reveals he needs colon cancer screening.    History regarding new onset headaches and memory loss:    August 5, 2021--patient reports approximately 2-month history of headachesapproximately 2 times per week.  He has had no previous history of headaches.  He questions whether or not it may be side effects from increase metoprolol which we increased from 25 mg/day to 50 mg/day about 2 months ago just before he started having the headaches.  The headaches are located in the frontal region as well as bitemporal.  Described as a pressure sensation.  It is not throbbing.  No nausea or vomiting.  He has no visual changes such as scintillating scotoma and no aura.  No peripheral neurologic symptoms such as numbness, weakness, or paresthesias.  The headaches last approximately 2 to 3 hours and resolve spontaneously.  Also patient is concerned because he is having problems with his memory.  He cannot recall clients names which he should know by heart.  Physical exam today is not revealing.  He has no focal neurologic deficit.  The exact etiology of his symptoms are not clear.  A little unusual to develop headaches after the age of 51.  The memory issues  are a little concerning as well.  I am not convinced that his symptoms are related to the metoprolol increase but that certainly possible.  I think brain imaging is indicated before we do much of anything such as neuropsychological testing.  Plan is as follows: MRI of the brain ordered.  Decrease metoprolol down to 25 mg/day.  We can follow-up on the phone for the results of the MRI but I want patient to follow back up in 1 month in person so we can reassess his symptoms as well as his blood pressure.    Review of Systems   Constitutional: Negative.   Eyes: Negative.    Cardiovascular: Negative.    Respiratory: Negative.    Endocrine: Negative.    Hematologic/Lymphatic: Negative.    Skin: Negative.    Musculoskeletal: Negative.    Gastrointestinal: Negative.    Genitourinary: Negative.    Neurological: Positive for headaches.   Psychiatric/Behavioral: Positive for memory loss.   Allergic/Immunologic: Negative.        Active Problems:    Patient Active Problem List   Diagnosis   • Allergic rhinitis   • Asymptomatic hyperuricemia   • Benign essential hypertension   • Chronic bilateral low back pain without sciatica   • Hyperlipidemia   • Borderline Hypogonadism male   • Impaired fasting glucose   • Male erectile disorder   • Morbid obesity (CMS/HCC)   • Obstructive sleep apnea, 2008--tolerates CPAP well.   • Vitamin D deficiency   • History of nephrolithiasis   • Therapeutic drug monitoring   • Routine physical examination   • Multiple environmental allergies   • Elevated liver enzymes   • Subclinical hypothyroidism   • New onset of headaches after age 51   • Memory loss of unknown cause   • Acute pain of left knee         Past Medical History:   Diagnosis Date   • Allergic rhinitis 1/22/2016    Patient has never had allergy testing.  Symptoms are seasonal.   • Asymptomatic hyperuricemia 8/25/2011 08/08/2015--patient seen in follow-up tolerated the lower well.  Uric acid upper limit of normal at 6.7.  Uloric  increased to 80 mg per day.  06/08/2015--uric acid not at goal.  Started on Uloric 40 mg per day.  08/25/2011--patient was noted to have elevated uric acid but has never had an episode of gout.  Allopurinol was initiated in hopes that this would reduce kidney stones.   • Benign essential hypertension 5/28/2008 02/16/2016--patient has lost about 60 pounds and his blood pressure is running low on half a dose of Benicar HCT 40/25.  Blood pressure medications discontinued except for metoprolol.  05/28/2008--treatment for hypertension begun.   • Borderline Hypogonadism male 8/25/2011 03/21/2017--patient seen in follow-up and he feels well and does not feel any different off of TRT.  His total testosterone is borderline low at 347.  His free and weakly bound testosterone is in the normal range at 77.0.  Patient needs to stay off of testosterone replacement therapy and we will continue to monitor.  06/30/2016--patient seen for a routine physical examination.  He reports that TRT   • Chronic bilateral low back pain without sciatica 6/19/2010    10/17/2014--patient presents with a five-day history of low back pain without radiation into the lower extremities.  No known trauma or injury.  He was making up the bed when this occurred.  No bowel or bladder symptoms.  Patient has a known history of herniated nucleus pulposus and bulging disks.  He has been taking some generic Aleve and this has not really helped much.  He had some leftover hyd   • Elevated liver enzymes 7/27/2017 02/06/2019--routine physical.  AST normal at 28, ALT mildly elevated at 60.  Suspect early PAGE despite normal liver ultrasound.  07/27/2017--routine physical.  AST elevated at 52, ALT elevated at 97.  Hepatitis C antibody screening and hepatitis B surface antigen ordered.  Repeat CMP ordered.  Ultrasound liver.  Hepatitis C and hepatitis B returned negative.  Repeat CMP showed improved liver enzy   • History of nephrolithiasis 7/19/2011     08/25/2011-- allopurinol initiated for hyperuricemia.  07/19/2011--patient presented with right flank pain with radiation into the right back. CT scan of the abdomen revealed a 4 mm stone in the dependent aspect of the urinary bladder.  Likely recent passage from the right ureter where there is mild right hydronephrosis.  1.6 cm nonobstructing stone at the lower pole of the left kidney and a punctate nonobstructing stone at the upper pole of the left kidney.  Patient passed the stone spontaneously.  Calcium oxalate.   • Hyperlipidemia 9/28/2008 09/28/2008--treatment for hyperlipidemia begun   • Impaired fasting glucose 4/1/2011 04/01/2011--initial diagnosis of impaired fasting glucose.  Initial hemoglobin A1c normal at 5.5.  A few months later 5.7.  Has never been significantly elevated.   • Male erectile disorder 1/22/2016   • Morbid obesity (CMS/HCC) 1/22/2016   • Multiple environmental allergies 1/22/2016    Patient has never had allergy testing.  Symptoms are seasonal.   • Obstructive sleep apnea, 2008--tolerates CPAP well. 1/22/2016    Diagnosed in spring of 2008.  Tolerates CPAP well.   • Subclinical hypothyroidism 2/6/2019 02/06/2019--routine physical.  TSH is slightly elevated at 4.53.  Free T3 and free T4 are normal.  Close observation.   • Vitamin D deficiency 1/22/2016         Past Surgical History:   Procedure Laterality Date   • KIDNEY SURGERY Left     Age 6 or 7.  Patient had some sort of swelling of his left kidney, possibly a cyst.  Surgical resection.  No further problems.         Allergies   Allergen Reactions   • Penicillins            Current Outpatient Medications:   •  allopurinol (ZYLOPRIM) 300 MG tablet, Take 1 tablet by mouth daily for gout., Disp: 90 tablet, Rfl: 1  •  Aspirin Low Dose 81 MG EC tablet, Take 1 tablet by mouth daily., Disp: 90 tablet, Rfl: 1  •  cholecalciferol (D3-50) 1.25 MG (13906 UT) capsule, Take 1 capsule by mouth three times a week on Monday, Wednesday, and  "Friday., Disp: 12 capsule, Rfl: 1  •  ezetimibe-simvastatin (VYTORIN) 10-40 MG per tablet, Take 1 tablet by mouth nightly., Disp: 90 tablet, Rfl: 1  •  Multiple Vitamins tablet, Take 1 tablet by mouth daily., Disp: , Rfl:   •  sildenafil (VIAGRA) 100 MG tablet, Take 1 tablet by mouth Daily As Needed for erectile dysfunction., Disp: 5 tablet, Rfl: 5  •  metoprolol succinate XL (Toprol XL) 25 MG 24 hr tablet, Take 1 p.o. daily for high blood pressure, Disp: 30 tablet, Rfl: 11      Family History   Problem Relation Age of Onset   • Alcohol abuse Father    • Heart failure Father    • Emphysema Father    • Diabetes Father    • Diabetes Sister          Social History     Socioeconomic History   • Marital status:      Spouse name: Not on file   • Number of children: 0   • Years of education: Not on file   • Highest education level: Some college, no degree   Tobacco Use   • Smoking status: Never Smoker   • Smokeless tobacco: Never Used   Vaping Use   • Vaping Use: Never used   Substance and Sexual Activity   • Alcohol use: Yes     Comment: Social alcohol consumption   • Drug use: No   • Sexual activity: Yes     Partners: Female         Vitals:    08/05/21 1406   BP: 130/70   Pulse: 105   Resp: 16   SpO2: 97%   Weight: (!) 166 kg (366 lb)   Height: 185.4 cm (73\")        Body mass index is 48.29 kg/m².      Physical Exam:    General: Alert and oriented x 3.  No acute distress. Morbidly obese. Normal affect.  HEENT: Pupils equal, round, reactive to light; extraocular movements intact; sclerae nonicteric; pharynx, ear canals and TMs normal.  Neck: Without JVD, thyromegaly, bruit, or adenopathy.  Lungs: Clear to auscultation in all fields.  Heart: Regular rate and rhythm without murmur, rub, gallop, or click.  Abdomen: Soft, nontender, without hepatosplenomegaly or hernia.  Bowel sounds normal.  : Deferred.  Rectal: Deferred.  Extremities: Without clubbing, cyanosis, edema, or pulse deficit.  Neurologic: Intact without " focal deficit.  Normal station and gait observed during ingress and egress from the examination room.  Skin: Without significant lesion.  Musculoskeletal: Unremarkable.    Lab/other results:      Assessment/Plan:     Diagnosis Plan   1. New onset of headaches after age 51  MRI Brain Without Contrast   2. Impaired fasting glucose  Comprehensive Metabolic Panel    Hemoglobin A1c   3. Hyperlipidemia  CK    NMR LipoProfile   4. Subclinical hypothyroidism  TSH    T4, Free    T3, Free   5. Benign essential hypertension  metoprolol succinate XL (Toprol XL) 25 MG 24 hr tablet   6. Elevated liver enzymes     7. Vitamin D deficiency  Vitamin D 25 Hydroxy   8. Asymptomatic hyperuricemia     9. Borderline Hypogonadism male     10. Male erectile disorder     11. Morbid obesity (CMS/HCC)     12. Therapeutic drug monitoring  CBC (No Diff)   13. Colon cancer screening  Ambulatory Referral For Screening Colonoscopy   14. Memory loss of unknown cause  MRI Brain Without Contrast   15. Acute pain of left knee  XR Knee 3 View Left     August 5, 2021--patient reports approximately 2-month history of headachesapproximately 2 times per week.  He has had no previous history of headaches.  He questions whether or not it may be side effects from increase metoprolol which we increased from 25 mg/day to 50 mg/day about 2 months ago just before he started having the headaches.  The headaches are located in the frontal region as well as bitemporal.  Described as a pressure sensation.  It is not throbbing.  No nausea or vomiting.  He has no visual changes such as scintillating scotoma and no aura.  No peripheral neurologic symptoms such as numbness, weakness, or paresthesias.  The headaches last approximately 2 to 3 hours and resolve spontaneously.  Also patient is concerned because he is having problems with his memory.  He cannot recall clients names which he should know by heart.  Physical exam today is not revealing.  He has no focal  neurologic deficit.  The exact etiology of his symptoms are not clear.  A little unusual to develop headaches after the age of 51.  The memory issues are a little concerning as well.  I am not convinced that his symptoms are related to the metoprolol increase but that certainly possible.  I think brain imaging is indicated before we do much of anything such as neuropsychological testing.      Plan is as follows: MRI of the brain ordered.  Decrease metoprolol down to 25 mg/day.  We can follow-up on the phone for the results of the MRI but I want patient to follow back up in 1 month in person so we can reassess his symptoms as well as his blood pressure.    August 5, 2021--addendum: At the end of the visit patient reports a 3-week history of left knee pain that is interfering with weightbearing.  No known injury.  Arising from a seated position and climbing stairs, going both up and down stairs, are the primary aggravating factors.  Plan is to obtain x-ray of the left knee first.  May need further imaging such as MRI to rule out internal derangement.      Procedures        Answers for HPI/ROS submitted by the patient on 8/3/2021  What is the primary reason for your visit?: Other  Please describe your symptoms.: Two issues: knee pain in my left knee that has persisted for over 2 weeks and slowly getting worse. Also experiencing some possible side effects from my increased metoprolol prescription, specifically headaches and memory issues.  Have you had these symptoms before?: No  How long have you been having these symptoms?: Greater than 2 weeks  Please list any medications you are currently taking for this condition.: Naproxen for my knee

## 2021-08-06 DIAGNOSIS — M25.562 ACUTE PAIN OF LEFT KNEE: Primary | ICD-10-CM

## 2021-08-06 DIAGNOSIS — M17.12 PRIMARY OSTEOARTHRITIS OF LEFT KNEE: ICD-10-CM

## 2021-08-06 DIAGNOSIS — M23.92 INTERNAL DERANGEMENT OF KNEE JOINT, LEFT: ICD-10-CM

## 2021-08-20 DIAGNOSIS — E55.9 VITAMIN D DEFICIENCY: ICD-10-CM

## 2021-08-31 ENCOUNTER — APPOINTMENT (OUTPATIENT)
Dept: MRI IMAGING | Facility: HOSPITAL | Age: 51
End: 2021-08-31

## 2021-09-08 ENCOUNTER — PREP FOR SURGERY (OUTPATIENT)
Dept: OTHER | Facility: HOSPITAL | Age: 51
End: 2021-09-08

## 2021-09-08 DIAGNOSIS — Z12.11 SCREEN FOR COLON CANCER: Primary | ICD-10-CM

## 2021-09-09 ENCOUNTER — HOSPITAL ENCOUNTER (OUTPATIENT)
Facility: HOSPITAL | Age: 51
Setting detail: HOSPITAL OUTPATIENT SURGERY
End: 2021-09-09
Attending: SURGERY | Admitting: SURGERY

## 2021-09-16 ENCOUNTER — APPOINTMENT (OUTPATIENT)
Dept: MRI IMAGING | Facility: HOSPITAL | Age: 51
End: 2021-09-16

## 2021-09-26 ENCOUNTER — HOSPITAL ENCOUNTER (OUTPATIENT)
Dept: MRI IMAGING | Facility: HOSPITAL | Age: 51
Discharge: HOME | End: 2021-09-26

## 2021-09-26 DIAGNOSIS — R41.3 MEMORY LOSS OF UNKNOWN CAUSE: ICD-10-CM

## 2021-09-26 DIAGNOSIS — M23.92 INTERNAL DERANGEMENT OF KNEE JOINT, LEFT: ICD-10-CM

## 2021-09-26 DIAGNOSIS — M25.562 ACUTE PAIN OF LEFT KNEE: ICD-10-CM

## 2021-09-26 DIAGNOSIS — M17.12 PRIMARY OSTEOARTHRITIS OF LEFT KNEE: ICD-10-CM

## 2021-09-26 DIAGNOSIS — R51.9 NEW ONSET OF HEADACHES AFTER AGE 50: ICD-10-CM

## 2021-10-06 ENCOUNTER — TELEPHONE (OUTPATIENT)
Dept: INTERNAL MEDICINE | Facility: CLINIC | Age: 51
End: 2021-10-06

## 2021-10-22 ENCOUNTER — APPOINTMENT (OUTPATIENT)
Dept: MRI IMAGING | Facility: HOSPITAL | Age: 51
End: 2021-10-22

## 2021-12-08 ENCOUNTER — TELEPHONE (OUTPATIENT)
Dept: INTERNAL MEDICINE | Facility: CLINIC | Age: 51
End: 2021-12-08

## 2021-12-08 NOTE — TELEPHONE ENCOUNTER
Caller: Victoriano Rowe    Relationship: Self    Best call back number: [phone]     What is the medical concern/diagnosis: COLONOSCOPY    What specialty or service is being requested: GASTROENTEROLOGIST     What is the provider, practice or medical service name: [facility]    [facility]     What is the office phone number: [phone]    Any additional details: PATIENT CALLING STATING THAT  DOES NOT TAKE HIS NEW INSURANCE HE STATED HE FOUND THIS PROVIDER THAT DOES TAKE HIS INSURANCE

## 2021-12-09 ENCOUNTER — TELEPHONE (OUTPATIENT)
Dept: SURGERY | Facility: CLINIC | Age: 51
End: 2021-12-09

## 2021-12-09 NOTE — TELEPHONE ENCOUNTER
Hub staff attempted to follow warm transfer process and was unsuccessful     Caller: Victoriano Rowe    Relationship to patient: Self    Best call back number: [phone]    Patient is needing: PT REQUEST TO CANCEL COLONOSCOPY WITH DR. JOSEPH.        HUB UNABLE TO WARM TRANSFER

## 2021-12-23 DIAGNOSIS — E79.0 ASYMPTOMATIC HYPERURICEMIA: Chronic | ICD-10-CM

## 2021-12-23 DIAGNOSIS — E78.2 MIXED HYPERLIPIDEMIA: Chronic | ICD-10-CM

## 2021-12-23 RX ORDER — ASPIRIN 81 MG/1
TABLET, COATED ORAL
Qty: 90 TABLET | Refills: 1 | Status: SHIPPED | OUTPATIENT
Start: 2021-12-23 | End: 2022-06-21

## 2021-12-23 RX ORDER — EZETIMIBE AND SIMVASTATIN 10; 40 MG/1; MG/1
TABLET ORAL
Qty: 90 TABLET | Refills: 1 | Status: SHIPPED | OUTPATIENT
Start: 2021-12-23 | End: 2022-06-21

## 2021-12-23 RX ORDER — ALLOPURINOL 300 MG/1
TABLET ORAL
Qty: 90 TABLET | Refills: 1 | Status: SHIPPED | OUTPATIENT
Start: 2021-12-23 | End: 2022-06-21

## 2022-02-21 DIAGNOSIS — E55.9 VITAMIN D DEFICIENCY: ICD-10-CM

## 2022-03-18 ENCOUNTER — ANESTHESIA EVENT (OUTPATIENT)
Dept: GASTROENTEROLOGY | Facility: HOSPITAL | Age: 52
End: 2022-03-18

## 2022-03-18 ENCOUNTER — HOSPITAL ENCOUNTER (OUTPATIENT)
Facility: HOSPITAL | Age: 52
Setting detail: HOSPITAL OUTPATIENT SURGERY
Discharge: HOME | End: 2022-03-18
Attending: INTERNAL MEDICINE | Admitting: INTERNAL MEDICINE

## 2022-03-18 ENCOUNTER — ANESTHESIA (OUTPATIENT)
Dept: GASTROENTEROLOGY | Facility: HOSPITAL | Age: 52
End: 2022-03-18

## 2022-03-18 ENCOUNTER — ON CAMPUS - OUTPATIENT (OUTPATIENT)
Dept: URBAN - METROPOLITAN AREA HOSPITAL 114 | Facility: HOSPITAL | Age: 52
End: 2022-03-18
Payer: COMMERCIAL

## 2022-03-18 VITALS
BODY MASS INDEX: 41.75 KG/M2 | WEIGHT: 315 LBS | OXYGEN SATURATION: 94 % | DIASTOLIC BLOOD PRESSURE: 77 MMHG | HEIGHT: 73 IN | SYSTOLIC BLOOD PRESSURE: 121 MMHG | RESPIRATION RATE: 16 BRPM | HEART RATE: 90 BPM

## 2022-03-18 DIAGNOSIS — K64.1 SECOND DEGREE HEMORRHOIDS: ICD-10-CM

## 2022-03-18 DIAGNOSIS — K57.30 DIVERTICULOSIS OF LARGE INTESTINE WITHOUT PERFORATION OR ABS: ICD-10-CM

## 2022-03-18 DIAGNOSIS — Z12.11 ENCOUNTER FOR SCREENING FOR MALIGNANT NEOPLASM OF COLON: ICD-10-CM

## 2022-03-18 PROCEDURE — 45378 DIAGNOSTIC COLONOSCOPY: CPT | Mod: 33 | Performed by: INTERNAL MEDICINE

## 2022-03-18 PROCEDURE — 25010000002 PROPOFOL 10 MG/ML EMULSION: Performed by: ANESTHESIOLOGY

## 2022-03-18 RX ORDER — SODIUM CHLORIDE, SODIUM LACTATE, POTASSIUM CHLORIDE, CALCIUM CHLORIDE 600; 310; 30; 20 MG/100ML; MG/100ML; MG/100ML; MG/100ML
1000 INJECTION, SOLUTION INTRAVENOUS CONTINUOUS
Status: DISCONTINUED | OUTPATIENT
Start: 2022-03-18 | End: 2022-03-18 | Stop reason: HOSPADM

## 2022-03-18 RX ORDER — SODIUM CHLORIDE, SODIUM LACTATE, POTASSIUM CHLORIDE, CALCIUM CHLORIDE 600; 310; 30; 20 MG/100ML; MG/100ML; MG/100ML; MG/100ML
INJECTION, SOLUTION INTRAVENOUS CONTINUOUS PRN
Status: DISCONTINUED | OUTPATIENT
Start: 2022-03-18 | End: 2022-03-18 | Stop reason: SURG

## 2022-03-18 RX ORDER — PROPOFOL 10 MG/ML
VIAL (ML) INTRAVENOUS CONTINUOUS PRN
Status: DISCONTINUED | OUTPATIENT
Start: 2022-03-18 | End: 2022-03-18 | Stop reason: SURG

## 2022-03-18 RX ADMIN — SODIUM CHLORIDE, POTASSIUM CHLORIDE, SODIUM LACTATE AND CALCIUM CHLORIDE 1000 ML: 600; 310; 30; 20 INJECTION, SOLUTION INTRAVENOUS at 08:00

## 2022-03-18 RX ADMIN — SODIUM CHLORIDE, POTASSIUM CHLORIDE, SODIUM LACTATE AND CALCIUM CHLORIDE: 600; 310; 30; 20 INJECTION, SOLUTION INTRAVENOUS at 08:10

## 2022-03-18 RX ADMIN — PROPOFOL 200 MCG/KG/MIN: 10 INJECTION, EMULSION INTRAVENOUS at 08:15

## 2022-03-18 NOTE — ANESTHESIA PREPROCEDURE EVALUATION
Anesthesia Evaluation                  Airway   Mallampati: II  TM distance: >3 FB  Neck ROM: full  No difficulty expected  Dental - normal exam     Pulmonary    (+) sleep apnea, wheezes,   Cardiovascular     Rhythm: regular    (+) hypertension, hyperlipidemia,       Neuro/Psych  GI/Hepatic/Renal/Endo    (+)   thyroid problem     Musculoskeletal     Abdominal    Substance History      OB/GYN          Other                        Anesthesia Plan    ASA 3     MAC   (D/W pt. MAC and possible awareness intra op.  Pt understands MAC and GA are not the same and the possibility of GA being required for failed MAC)  intravenous induction           CODE STATUS:

## 2022-03-18 NOTE — H&P
[facility]   HISTORY AND PHYSICAL    Patient Name: Victoriano Rowe  : 1970  MRN: [id number]  Primary Care Physician:  Alex Quintanilla MD  Date of admission: 3/18/2022    Subjective   Subjective     Chief Complaint: screening     History of Present Illness  The patient presents with no gastrointestinal  Symptoms or issues at this time   Review of Systems   All other systems reviewed and are negative.       Personal History     Past Medical History:   Diagnosis Date   • Allergic rhinitis 2016    Patient has never had allergy testing.  Symptoms are seasonal.   • Asymptomatic hyperuricemia 2015--patient seen in follow-up tolerated the lower well.  Uric acid upper limit of normal at 6.7.  Uloric increased to 80 mg per day.  2015--uric acid not at goal.  Started on Uloric 40 mg per day.  2011--patient was noted to have elevated uric acid but has never had an episode of gout.  Allopurinol was initiated in hopes that this would reduce kidney stones.   • Benign essential hypertension 2016--patient has lost about 60 pounds and his blood pressure is running low on half a dose of Benicar HCT 40/25.  Blood pressure medications discontinued except for metoprolol.  2008--treatment for hypertension begun.   • Borderline Hypogonadism male 2017--patient seen in follow-up and he feels well and does not feel any different off of TRT.  His total testosterone is borderline low at 347.  His free and weakly bound testosterone is in the normal range at 77.0.  Patient needs to stay off of testosterone replacement therapy and we will continue to monitor.  2016--patient seen for a routine physical examination.  He reports that TRT   • Chronic bilateral low back pain without sciatica 2010    10/17/2014--patient presents with a five-day history of low back pain without radiation into the lower extremities.  No known trauma or injury.  He was  making up the bed when this occurred.  No bowel or bladder symptoms.  Patient has a known history of herniated nucleus pulposus and bulging disks.  He has been taking some generic Aleve and this has not really helped much.  He had some leftover hyd   • Elevated liver enzymes 7/27/2017 02/06/2019--routine physical.  AST normal at 28, ALT mildly elevated at 60.  Suspect early PAGE despite normal liver ultrasound.  07/27/2017--routine physical.  AST elevated at 52, ALT elevated at 97.  Hepatitis C antibody screening and hepatitis B surface antigen ordered.  Repeat CMP ordered.  Ultrasound liver.  Hepatitis C and hepatitis B returned negative.  Repeat CMP showed improved liver enzy   • History of nephrolithiasis 7/19/2011 08/25/2011-- allopurinol initiated for hyperuricemia.  07/19/2011--patient presented with right flank pain with radiation into the right back. CT scan of the abdomen revealed a 4 mm stone in the dependent aspect of the urinary bladder.  Likely recent passage from the right ureter where there is mild right hydronephrosis.  1.6 cm nonobstructing stone at the lower pole of the left kidney and a punctate nonobstructing stone at the upper pole of the left kidney.  Patient passed the stone spontaneously.  Calcium oxalate.   • Hyperlipidemia 9/28/2008 09/28/2008--treatment for hyperlipidemia begun   • Impaired fasting glucose 4/1/2011 04/01/2011--initial diagnosis of impaired fasting glucose.  Initial hemoglobin A1c normal at 5.5.  A few months later 5.7.  Has never been significantly elevated.   • Male erectile disorder 1/22/2016   • Morbid obesity (HCC) 1/22/2016   • Multiple environmental allergies 1/22/2016    Patient has never had allergy testing.  Symptoms are seasonal.   • Obstructive sleep apnea, 2008--tolerates CPAP well. 1/22/2016    Diagnosed in spring of 2008.  Tolerates CPAP well.   • Subclinical hypothyroidism 2/6/2019 02/06/2019--routine physical.  TSH is slightly elevated at  4.53.  Free T3 and free T4 are normal.  Close observation.   • Vitamin D deficiency 1/22/2016       Past Surgical History:   Procedure Laterality Date   • KIDNEY SURGERY Left     Age [age].  Patient had some sort of swelling of his left kidney, possibly a cyst.  Surgical resection.  No further problems.       Family History: family history includes Alcohol abuse in his father; Diabetes in his father and sister; Emphysema in his father; Heart failure in his father. Otherwise pertinent FHx was reviewed and not pertinent to current issue.    Social History:  reports that he has never smoked. He has never used smokeless tobacco. He reports current alcohol use. He reports that he does not use drugs.    Home Medications:  allopurinol, aspirin, cholecalciferol, ezetimibe-simvastatin, metoprolol succinate XL, multivitamin, and sildenafil    Allergies:  No Known Allergies    Objective    Objective     Vitals:   Heart Rate:  [96] 96  Resp:  [16] 16  BP: (125)/(86) 125/86    Physical Exam  HENT:      Right Ear: External ear normal.      Left Ear: External ear normal.      Mouth/Throat:      Pharynx: Oropharynx is clear.   Eyes:      Conjunctiva/sclera: Conjunctivae normal.   Cardiovascular:      Rate and Rhythm: Normal rate.      Pulses: Normal pulses.   Pulmonary:      Effort: Pulmonary effort is normal.   Abdominal:      General: Abdomen is flat.   Musculoskeletal:      Cervical back: Normal range of motion.   Skin:     General: Skin is warm.   Neurological:      General: No focal deficit present.      Mental Status: He is alert.   Psychiatric:         Mood and Affect: Mood normal.         Result Review    Result Review:  I have personally reviewed the results from the time of this admission to 3/18/2022 08:13 EDT and agree with these findings:  []  Laboratory  []  Microbiology  []  Radiology  []  EKG/Telemetry   []  Cardiology/Vascular   []  Pathology  []  Old records  []  Other:    Assessment/Plan   Assessment / Plan      Brief Patient Summary:  Victoriano Rowe is a 52 y.o. male   Age related colon cancer screening    Active Hospital Problems:  There are no active hospital problems to display for this patient.    Plan: Colonoscopy risks, alternatives and benefits discussed with patient and the patient is agreeable to having procedure done.    DVT prophylaxis:  No DVT prophylaxis order currently exists.    CODE STATUS:       Admission Status:  I believe this patient meets outpatient status.    Mitchell Morrison MD

## 2022-03-18 NOTE — DISCHARGE INSTRUCTIONS
For the next 24 hours patient needs to be with a responsible adult.    For 24 hours DO NOT drive, operate machinery, appliances, drink alcohol, make important decisions or sign legal documents.    Start with a light or bland diet if you are feeling sick to your stomach otherwise advance to regular diet as tolerated.    Follow recommendations on procedure report if provided by your doctor.    Call Dr Morrison for problems [phone]    Problems may include but not limited to: large amounts of bleeding, trouble breathing, repeated vomiting, severe unrelieved pain, fever or chills.

## 2022-03-18 NOTE — ANESTHESIA POSTPROCEDURE EVALUATION
Patient: Victoriano Rowe    Procedure Summary     Date: 03/18/22 Room / Location:  [facility]    Anesthesia Start: 0811 Anesthesia Stop: 0840    Procedure: COLONOSCOPY to CECUM AND TI (N/A ) Diagnosis:     Surgeons: Mitchell Morrison MD Provider: Domingo Sahni MD    Anesthesia Type: MAC ASA Status: 3          Anesthesia Type: MAC    Vitals  Vitals Value Taken Time   /77 03/18/22 0859   Temp     Pulse 90 03/18/22 0859   Resp 16 03/18/22 0859   SpO2 94 % 03/18/22 0908           Post Anesthesia Care and Evaluation    No anesthesia care post op

## 2022-06-21 DIAGNOSIS — E55.9 VITAMIN D DEFICIENCY: ICD-10-CM

## 2022-06-21 DIAGNOSIS — E79.0 ASYMPTOMATIC HYPERURICEMIA: Chronic | ICD-10-CM

## 2022-06-21 DIAGNOSIS — I10 BENIGN ESSENTIAL HYPERTENSION: Chronic | ICD-10-CM

## 2022-06-21 DIAGNOSIS — E78.2 MIXED HYPERLIPIDEMIA: Chronic | ICD-10-CM

## 2022-06-21 RX ORDER — ASPIRIN 81 MG/1
TABLET, COATED ORAL
Qty: 90 TABLET | Refills: 0 | Status: SHIPPED | OUTPATIENT
Start: 2022-06-21

## 2022-06-21 RX ORDER — ALLOPURINOL 300 MG/1
TABLET ORAL
Qty: 90 TABLET | Refills: 0 | Status: SHIPPED | OUTPATIENT
Start: 2022-06-21 | End: 2022-09-19 | Stop reason: SDUPTHER

## 2022-06-21 RX ORDER — METOPROLOL SUCCINATE 25 MG/1
TABLET, EXTENDED RELEASE ORAL
Qty: 30 TABLET | Refills: 0 | Status: SHIPPED | OUTPATIENT
Start: 2022-06-21 | End: 2022-07-25

## 2022-06-21 RX ORDER — EZETIMIBE AND SIMVASTATIN 10; 40 MG/1; MG/1
TABLET ORAL
Qty: 90 TABLET | Refills: 0 | Status: SHIPPED | OUTPATIENT
Start: 2022-06-21 | End: 2022-09-19 | Stop reason: SDUPTHER

## 2022-07-24 DIAGNOSIS — I10 BENIGN ESSENTIAL HYPERTENSION: Chronic | ICD-10-CM

## 2022-07-24 DIAGNOSIS — E55.9 VITAMIN D DEFICIENCY: ICD-10-CM

## 2022-07-25 RX ORDER — METOPROLOL SUCCINATE 25 MG/1
TABLET, EXTENDED RELEASE ORAL
Qty: 30 TABLET | Refills: 0 | Status: SHIPPED | OUTPATIENT
Start: 2022-07-25 | End: 2022-09-19 | Stop reason: SDUPTHER

## 2022-08-19 DIAGNOSIS — E55.9 VITAMIN D DEFICIENCY: ICD-10-CM

## 2022-08-19 DIAGNOSIS — I10 BENIGN ESSENTIAL HYPERTENSION: Chronic | ICD-10-CM

## 2022-08-19 RX ORDER — METOPROLOL SUCCINATE 25 MG/1
TABLET, EXTENDED RELEASE ORAL
Qty: 30 TABLET | Refills: 0 | OUTPATIENT
Start: 2022-08-19

## 2022-09-17 DIAGNOSIS — E78.2 MIXED HYPERLIPIDEMIA: Chronic | ICD-10-CM

## 2022-09-17 DIAGNOSIS — E79.0 ASYMPTOMATIC HYPERURICEMIA: Chronic | ICD-10-CM

## 2022-09-19 ENCOUNTER — OFFICE VISIT (OUTPATIENT)
Dept: INTERNAL MEDICINE | Facility: CLINIC | Age: 52
End: 2022-09-19

## 2022-09-19 VITALS
OXYGEN SATURATION: 95 % | HEIGHT: 73 IN | DIASTOLIC BLOOD PRESSURE: 80 MMHG | SYSTOLIC BLOOD PRESSURE: 136 MMHG | HEART RATE: 92 BPM | BODY MASS INDEX: 41.75 KG/M2 | WEIGHT: 315 LBS

## 2022-09-19 DIAGNOSIS — I10 BENIGN ESSENTIAL HYPERTENSION: Chronic | ICD-10-CM

## 2022-09-19 DIAGNOSIS — E29.1 HYPOGONADISM MALE: Chronic | ICD-10-CM

## 2022-09-19 DIAGNOSIS — K57.30 DIVERTICULOSIS OF COLON: Chronic | ICD-10-CM

## 2022-09-19 DIAGNOSIS — E55.9 VITAMIN D DEFICIENCY: Chronic | ICD-10-CM

## 2022-09-19 DIAGNOSIS — G47.33 OBSTRUCTIVE SLEEP APNEA: Chronic | ICD-10-CM

## 2022-09-19 DIAGNOSIS — Z87.442 HISTORY OF NEPHROLITHIASIS: Chronic | ICD-10-CM

## 2022-09-19 DIAGNOSIS — R74.8 ELEVATED LIVER ENZYMES: Chronic | ICD-10-CM

## 2022-09-19 DIAGNOSIS — Z51.81 THERAPEUTIC DRUG MONITORING: ICD-10-CM

## 2022-09-19 DIAGNOSIS — G89.29 CHRONIC BILATERAL LOW BACK PAIN WITHOUT SCIATICA: Chronic | ICD-10-CM

## 2022-09-19 DIAGNOSIS — M54.50 CHRONIC BILATERAL LOW BACK PAIN WITHOUT SCIATICA: Chronic | ICD-10-CM

## 2022-09-19 DIAGNOSIS — Z91.09 MULTIPLE ENVIRONMENTAL ALLERGIES: Chronic | ICD-10-CM

## 2022-09-19 DIAGNOSIS — R73.01 IMPAIRED FASTING GLUCOSE: Chronic | ICD-10-CM

## 2022-09-19 DIAGNOSIS — E79.0 ASYMPTOMATIC HYPERURICEMIA: Chronic | ICD-10-CM

## 2022-09-19 DIAGNOSIS — E03.8 SUBCLINICAL HYPOTHYROIDISM: Chronic | ICD-10-CM

## 2022-09-19 DIAGNOSIS — Z00.00 ROUTINE PHYSICAL EXAMINATION: Primary | ICD-10-CM

## 2022-09-19 DIAGNOSIS — E66.01 MORBID OBESITY: Chronic | ICD-10-CM

## 2022-09-19 DIAGNOSIS — E78.2 MIXED HYPERLIPIDEMIA: Chronic | ICD-10-CM

## 2022-09-19 PROBLEM — M17.12 PRIMARY OSTEOARTHRITIS OF LEFT KNEE: Status: RESOLVED | Noted: 2021-08-06 | Resolved: 2022-09-19

## 2022-09-19 PROBLEM — M25.562 ACUTE PAIN OF LEFT KNEE: Status: RESOLVED | Noted: 2021-08-05 | Resolved: 2022-09-19

## 2022-09-19 PROBLEM — M23.92 INTERNAL DERANGEMENT OF KNEE JOINT, LEFT: Status: RESOLVED | Noted: 2021-08-06 | Resolved: 2022-09-19

## 2022-09-19 PROBLEM — R41.3 MEMORY LOSS OF UNKNOWN CAUSE: Status: RESOLVED | Noted: 2021-08-05 | Resolved: 2022-09-19

## 2022-09-19 PROBLEM — R51.9 NEW ONSET OF HEADACHES AFTER AGE 50: Status: RESOLVED | Noted: 2021-08-05 | Resolved: 2022-09-19

## 2022-09-19 PROCEDURE — 99396 PREV VISIT EST AGE 40-64: CPT | Performed by: INTERNAL MEDICINE

## 2022-09-19 RX ORDER — METOPROLOL SUCCINATE 25 MG/1
TABLET, EXTENDED RELEASE ORAL
Qty: 30 TABLET | Refills: 5 | Status: SHIPPED | OUTPATIENT
Start: 2022-09-19 | End: 2023-02-13

## 2022-09-19 RX ORDER — EZETIMIBE AND SIMVASTATIN 10; 40 MG/1; MG/1
TABLET ORAL
Qty: 90 TABLET | Refills: 0 | OUTPATIENT
Start: 2022-09-19

## 2022-09-19 RX ORDER — ASPIRIN 81 MG/1
TABLET, COATED ORAL
Qty: 90 TABLET | Refills: 0 | OUTPATIENT
Start: 2022-09-19

## 2022-09-19 RX ORDER — EZETIMIBE AND SIMVASTATIN 10; 40 MG/1; MG/1
TABLET ORAL
Qty: 30 TABLET | Refills: 5 | Status: SHIPPED | OUTPATIENT
Start: 2022-09-19 | End: 2023-03-21

## 2022-09-19 RX ORDER — ALLOPURINOL 300 MG/1
TABLET ORAL
Qty: 90 TABLET | Refills: 0 | OUTPATIENT
Start: 2022-09-19

## 2022-09-19 RX ORDER — ALLOPURINOL 300 MG/1
TABLET ORAL
Qty: 30 TABLET | Refills: 5 | Status: SHIPPED | OUTPATIENT
Start: 2022-09-19 | End: 2023-03-21

## 2022-09-19 NOTE — PROGRESS NOTES
09/19/2022    Patient Information  Victoriano Rowe                                                                                          [address] [facility] [place] [address]      1970  [unfilled]  [phone] (work)    Chief Complaint:     Routine physical examination.  No new acute complaints.    History of Present Illness:    Patient with impaired fasting glucose, hyperlipidemia, hyperuricemia, hypertension, vitamin D deficiency, elevated liver enzymes, subclinical hypothyroidism, morbid obesity, chronic lower back pain, borderline hypergonadism, sleep apnea, history of kidney stones, multiple environmental allergies.  He presents today for his routine annual physical exam.  He was supposed to have blood work but unfortunately did not.  His past medical history reviewed and updated were necessary including health maintenance parameters.  This reveals he needs a Shingrix vaccine and I encouraged him to check with his insurance regarding coverage.  COVID booster also discussed.    Review of Systems   Constitutional: Negative.   HENT: Negative.    Eyes: Negative.    Cardiovascular: Negative.    Respiratory: Negative.    Endocrine: Negative.    Hematologic/Lymphatic: Negative.    Skin: Negative.    Musculoskeletal: Positive for arthritis and back pain.   Gastrointestinal: Negative.    Genitourinary: Negative.    Neurological: Negative.    Psychiatric/Behavioral: Negative.    Allergic/Immunologic: Negative.        Active Problems:    Patient Active Problem List   Diagnosis   • Allergic rhinitis   • Asymptomatic hyperuricemia   • Benign essential hypertension   • Chronic bilateral low back pain without sciatica   • Hyperlipidemia   • Borderline Hypogonadism male   • Impaired fasting glucose   • Male erectile disorder   • Morbid obesity (HCC)   • Obstructive sleep apnea, 2008--tolerates CPAP well.   • Vitamin D deficiency   • History of nephrolithiasis   • Therapeutic drug monitoring   •  Routine physical examination   • Multiple environmental allergies   • Elevated liver enzymes   • Subclinical hypothyroidism   • Diverticulosis of colon         Past Medical History:   Diagnosis Date   • Allergic rhinitis 1/22/2016    Patient has never had allergy testing.  Symptoms are seasonal.   • Asymptomatic hyperuricemia 8/25/2011 08/08/2015--patient seen in follow-up tolerated the lower well.  Uric acid upper limit of normal at 6.7.  Uloric increased to 80 mg per day.  06/08/2015--uric acid not at goal.  Started on Uloric 40 mg per day.  08/25/2011--patient was noted to have elevated uric acid but has never had an episode of gout.  Allopurinol was initiated in hopes that this would reduce kidney stones.   • Benign essential hypertension 5/28/2008 02/16/2016--patient has lost about 60 pounds and his blood pressure is running low on half a dose of Benicar HCT 40/25.  Blood pressure medications discontinued except for metoprolol.  05/28/2008--treatment for hypertension begun.   • Borderline Hypogonadism male 8/25/2011 03/21/2017--patient seen in follow-up and he feels well and does not feel any different off of TRT.  His total testosterone is borderline low at 347.  His free and weakly bound testosterone is in the normal range at 77.0.  Patient needs to stay off of testosterone replacement therapy and we will continue to monitor.  06/30/2016--patient seen for a routine physical examination.  He reports that TRT   • Chronic bilateral low back pain without sciatica 6/19/2010    10/17/2014--patient presents with a five-day history of low back pain without radiation into the lower extremities.  No known trauma or injury.  He was making up the bed when this occurred.  No bowel or bladder symptoms.  Patient has a known history of herniated nucleus pulposus and bulging disks.  He has been taking some generic Aleve and this has not really helped much.  He had some leftover hyd   • Elevated liver enzymes  7/27/2017 02/06/2019--routine physical.  AST normal at 28, ALT mildly elevated at 60.  Suspect early PAGE despite normal liver ultrasound.  07/27/2017--routine physical.  AST elevated at 52, ALT elevated at 97.  Hepatitis C antibody screening and hepatitis B surface antigen ordered.  Repeat CMP ordered.  Ultrasound liver.  Hepatitis C and hepatitis B returned negative.  Repeat CMP showed improved liver enzy   • History of nephrolithiasis 7/19/2011 08/25/2011-- allopurinol initiated for hyperuricemia.  07/19/2011--patient presented with right flank pain with radiation into the right back. CT scan of the abdomen revealed a 4 mm stone in the dependent aspect of the urinary bladder.  Likely recent passage from the right ureter where there is mild right hydronephrosis.  1.6 cm nonobstructing stone at the lower pole of the left kidney and a punctate nonobstructing stone at the upper pole of the left kidney.  Patient passed the stone spontaneously.  Calcium oxalate.   • Hyperlipidemia 9/28/2008 09/28/2008--treatment for hyperlipidemia begun   • Impaired fasting glucose 4/1/2011 04/01/2011--initial diagnosis of impaired fasting glucose.  Initial hemoglobin A1c normal at 5.5.  A few months later 5.7.  Has never been significantly elevated.   • Male erectile disorder 1/22/2016   • Morbid obesity (HCC) 1/22/2016   • Multiple environmental allergies 1/22/2016    Patient has never had allergy testing.  Symptoms are seasonal.   • Obstructive sleep apnea, 2008--tolerates CPAP well. 1/22/2016    Diagnosed in spring of 2008.  Tolerates CPAP well.   • Subclinical hypothyroidism 2/6/2019 02/06/2019--routine physical.  TSH is slightly elevated at 4.53.  Free T3 and free T4 are normal.  Close observation.   • Vitamin D deficiency 1/22/2016         Past Surgical History:   Procedure Laterality Date   • COLONOSCOPY N/A 03/18/2022 March 18, 2022--colonoscopy revealed normal terminal ileum.  Few medium mouth diverticuli  "were found in the sigmoid colon.  Nonbleeding internal hemorrhoids grade 2.   • KIDNEY SURGERY Left     Age [age].  Patient had some sort of swelling of his left kidney, possibly a cyst.  Surgical resection.  No further problems.         No Known Allergies        Current Outpatient Medications:   •  Aspirin Low Dose 81 MG EC tablet, Take 1 tablet by mouth daily., Disp: 90 tablet, Rfl: 0  •  Multiple Vitamins tablet, Take 1 tablet by mouth daily., Disp: , Rfl:   •  sildenafil (VIAGRA) 100 MG tablet, Take 1 tablet by mouth Daily As Needed for erectile dysfunction., Disp: 5 tablet, Rfl: 5  •  allopurinol (ZYLOPRIM) 300 MG tablet, Take 1 p.o. daily for gout/elevated uric acid, Disp: 30 tablet, Rfl: 5  •  cholecalciferol (D3-50) 1.25 MG (94277 UT) capsule, Take 1 capsule by mouth three times a week on Monday, Wednesday, and Friday., Disp: 12 capsule, Rfl: 5  •  ezetimibe-simvastatin (VYTORIN) 10-40 MG per tablet, Take 1 p.o. daily for high cholesterol, Disp: 30 tablet, Rfl: 5  •  metoprolol succinate XL (TOPROL-XL) 25 MG 24 hr tablet, Take 1 p.o. every morning for high blood pressure, Disp: 30 tablet, Rfl: 5      Family History   Problem Relation Age of Onset   • Alcohol abuse Father    • Heart failure Father    • Emphysema Father    • Diabetes Father    • Diabetes Sister          Social History     Socioeconomic History   • Marital status:    • Number of children: 0   • Highest education level: Some college, no degree   Tobacco Use   • Smoking status: Never Smoker   • Smokeless tobacco: Never Used   Vaping Use   • Vaping Use: Never used   Substance and Sexual Activity   • Alcohol use: Yes     Comment: Social alcohol consumption   • Drug use: No   • Sexual activity: Yes     Partners: Female         Vitals:    09/19/22 1406   BP: 136/80   Pulse: 92   SpO2: 95%   Weight: (!) 154 kg (340 lb 9.6 oz)   Height: 185.4 cm (73\")        Body mass index is 44.94 kg/m².      Physical Exam:    General: Alert and oriented x 3. "  No acute distress.  Morbidly obese.  Normal affect.  HEENT: Pupils equal, round, reactive to light; extraocular movements intact; sclerae nonicteric; pharynx, ear canals and TMs normal.  Neck: Without JVD, thyromegaly, bruit, or adenopathy.  Lungs: Clear to auscultation in all fields.  Heart: Regular rate and rhythm without murmur, rub, gallop, or click.  Abdomen: Soft, nontender, without hepatosplenomegaly or hernia.  Bowel sounds normal.  : Deferred.  Rectal: Deferred.  Extremities: Without clubbing, cyanosis, edema, or pulse deficit.  Neurologic: Intact without focal deficit.  Normal station and gait observed during ingress and egress from the examination room.  Skin: Without significant lesion.  Musculoskeletal: Unremarkable.    Lab/other results:    Lab work was not performed prior to this visit.    Assessment/Plan:     Diagnosis Plan   1. Routine physical examination  CBC (No Diff)    CK    Comprehensive Metabolic Panel    Hemoglobin A1c    NMR LipoProfile    TSH    T4, Free    T3, Free    PSA DIAGNOSTIC    Urinalysis With Microscopic If Indicated (No Culture) - Urine, Clean Catch    Vitamin D 25 Hydroxy    Testosterone,Free+Weakly Bound   2. Impaired fasting glucose  Comprehensive Metabolic Panel    Hemoglobin A1c    Urinalysis With Microscopic If Indicated (No Culture) - Urine, Clean Catch    Comprehensive Metabolic Panel    Hemoglobin A1c   3. Hyperlipidemia  CK    Comprehensive Metabolic Panel    NMR LipoProfile    CK    Comprehensive Metabolic Panel    NMR LipoProfile    ezetimibe-simvastatin (VYTORIN) 10-40 MG per tablet   4. Asymptomatic hyperuricemia  allopurinol (ZYLOPRIM) 300 MG tablet   5. Benign essential hypertension  Comprehensive Metabolic Panel    metoprolol succinate XL (TOPROL-XL) 25 MG 24 hr tablet   6. Vitamin D deficiency  Vitamin D 25 Hydroxy    Vitamin D 25 Hydroxy    cholecalciferol (D3-50) 1.25 MG (15716 UT) capsule   7. Elevated liver enzymes     8. Subclinical hypothyroidism  TSH     T4, Free    T3, Free    TSH    T4, Free    T3, Free   9. Morbid obesity (HCC)     10. Chronic bilateral low back pain without sciatica     11. Borderline Hypogonadism male  Testosterone,Free+Weakly Bound   12. Obstructive sleep apnea, 2008--tolerates CPAP well.     13. History of nephrolithiasis     14. Multiple environmental allergies     15. Therapeutic drug monitoring  CBC (No Diff)    PSA DIAGNOSTIC   16. Diverticulosis of colon       Patient with multiple medical problems as noted above presents today for his physical.  Unfortunately, we cannot assess these issues without having blood work first.  Patient did have a recent colonoscopy which was essentially negative except for a few diverticuli.    Several preventative health issues discussed including review of vaccinations and recommendations, including dietary issues, exercise and weight loss.  Safe sex practices discussed.  Patient advised to wear seatbelt whenever driving and avoid texting and driving.  Also advised to look both ways before crossing the street.  Patient is up-to-date on his colonoscopy.  Advised to avoid tobacco products and minimize alcohol consumption.    Plan is as follows: We will send patient to the lab and follow-up on the phone for the results.  I explained him this is not the ideal situation particularly in someone with his medical issues.  Recommend he check with his insurance company regarding Shingrix vaccine.  We will schedule fasting lab and follow-up for 6 months.      Procedures

## 2022-10-20 DIAGNOSIS — I10 BENIGN ESSENTIAL HYPERTENSION: Primary | Chronic | ICD-10-CM

## 2022-10-20 DIAGNOSIS — E29.1 HYPOGONADISM MALE: Chronic | ICD-10-CM

## 2022-10-20 DIAGNOSIS — E78.2 MIXED HYPERLIPIDEMIA: Chronic | ICD-10-CM

## 2022-10-20 DIAGNOSIS — Z51.81 THERAPEUTIC DRUG MONITORING: ICD-10-CM

## 2022-10-20 DIAGNOSIS — R73.01 IMPAIRED FASTING GLUCOSE: Chronic | ICD-10-CM

## 2022-10-20 DIAGNOSIS — E55.9 VITAMIN D DEFICIENCY: Chronic | ICD-10-CM

## 2022-10-25 DIAGNOSIS — Z00.00 ROUTINE PHYSICAL EXAMINATION: ICD-10-CM

## 2022-10-25 DIAGNOSIS — E29.1 HYPOGONADISM MALE: Chronic | ICD-10-CM

## 2022-10-25 DIAGNOSIS — Z00.00 ROUTINE PHYSICAL EXAMINATION: Primary | ICD-10-CM

## 2022-10-25 DIAGNOSIS — Z51.81 THERAPEUTIC DRUG MONITORING: ICD-10-CM

## 2022-10-27 LAB
25(OH)D3+25(OH)D2 SERPL-MCNC: 99.7 NG/ML (ref 30–100)
ALBUMIN SERPL-MCNC: 4.3 G/DL (ref 3.5–5.2)
ALBUMIN/GLOB SERPL: 1.7 G/DL
ALP SERPL-CCNC: 56 U/L (ref 39–117)
ALT SERPL-CCNC: 38 U/L (ref 1–41)
APPEARANCE UR: CLEAR
AST SERPL-CCNC: 23 U/L (ref 1–40)
BACTERIA #/AREA URNS HPF: ABNORMAL /HPF
BILIRUB SERPL-MCNC: 0.3 MG/DL (ref 0–1.2)
BILIRUB UR QL STRIP: NEGATIVE
BUN SERPL-MCNC: 11 MG/DL (ref 6–20)
BUN/CREAT SERPL: 12.8 (ref 7–25)
CALCIUM SERPL-MCNC: 9.4 MG/DL (ref 8.6–10.5)
CASTS URNS MICRO: ABNORMAL
CHLORIDE SERPL-SCNC: 102 MMOL/L (ref 98–107)
CHOLEST SERPL-MCNC: 97 MG/DL (ref 100–199)
CK SERPL-CCNC: 61 U/L (ref 20–200)
CO2 SERPL-SCNC: 29.7 MMOL/L (ref 22–29)
COLOR UR: YELLOW
CREAT SERPL-MCNC: 0.86 MG/DL (ref 0.76–1.27)
EGFRCR SERPLBLD CKD-EPI 2021: 104.8 ML/MIN/1.73
EPI CELLS #/AREA URNS HPF: ABNORMAL /HPF
ERYTHROCYTE [DISTWIDTH] IN BLOOD BY AUTOMATED COUNT: 12.8 % (ref 12.3–15.4)
GLOBULIN SER CALC-MCNC: 2.5 GM/DL
GLUCOSE SERPL-MCNC: 104 MG/DL (ref 65–99)
GLUCOSE UR QL STRIP: NEGATIVE
HBA1C MFR BLD: 6 % (ref 4.8–5.6)
HCT VFR BLD AUTO: 45 % (ref 37.5–51)
HDL SERPL-SCNC: 31.7 UMOL/L
HDLC SERPL-MCNC: 38 MG/DL
HGB BLD-MCNC: 14.8 G/DL (ref 13–17.7)
HGB UR QL STRIP: NEGATIVE
KETONES UR QL STRIP: NEGATIVE
LDL SERPL QN: 19.7 NM
LDL SERPL-SCNC: 476 NMOL/L
LDL SMALL SERPL-SCNC: 371 NMOL/L
LDLC SERPL CALC-MCNC: 36 MG/DL (ref 0–99)
LEUKOCYTE ESTERASE UR QL STRIP: NEGATIVE
MCH RBC QN AUTO: 29.4 PG (ref 26.6–33)
MCHC RBC AUTO-ENTMCNC: 32.9 G/DL (ref 31.5–35.7)
MCV RBC AUTO: 89.3 FL (ref 79–97)
NITRITE UR QL STRIP: NEGATIVE
PH UR STRIP: 5.5 [PH] (ref 5–8)
PLATELET # BLD AUTO: 166 10*3/MM3 (ref 140–450)
POTASSIUM SERPL-SCNC: 4.3 MMOL/L (ref 3.5–5.2)
PROT SERPL-MCNC: 6.8 G/DL (ref 6–8.5)
PROT UR QL STRIP: ABNORMAL
PSA SERPL-MCNC: 0.36 NG/ML (ref 0–4)
RBC # BLD AUTO: 5.04 10*6/MM3 (ref 4.14–5.8)
RBC #/AREA URNS HPF: ABNORMAL /HPF
SODIUM SERPL-SCNC: 143 MMOL/L (ref 136–145)
SP GR UR STRIP: 1.02 (ref 1–1.03)
T3FREE SERPL-MCNC: 2.9 PG/ML (ref 2–4.4)
T4 FREE SERPL-MCNC: 1.09 NG/DL (ref 0.93–1.7)
TESTOST SERPL-MCNC: 235 NG/DL (ref 264–916)
TESTOSTERONE.FREE+WB MFR SERPL: 31 % (ref 9–46)
TESTOSTERONE.FREE+WB SERPL-MCNC: 72.9 NG/DL (ref 40–250)
TRIGL SERPL-MCNC: 132 MG/DL (ref 0–149)
TSH SERPL DL<=0.005 MIU/L-ACNC: 3.76 UIU/ML (ref 0.27–4.2)
UROBILINOGEN UR STRIP-MCNC: ABNORMAL MG/DL
WBC # BLD AUTO: 6.04 10*3/MM3 (ref 3.4–10.8)
WBC #/AREA URNS HPF: ABNORMAL /HPF

## 2023-01-14 DIAGNOSIS — E55.9 VITAMIN D DEFICIENCY: Chronic | ICD-10-CM

## 2023-01-16 RX ORDER — CHOLECALCIFEROL (VITAMIN D3) 1250 MCG
CAPSULE ORAL
Qty: 12 CAPSULE | Refills: 4 | Status: SHIPPED | OUTPATIENT
Start: 2023-01-16

## 2023-02-13 DIAGNOSIS — I10 BENIGN ESSENTIAL HYPERTENSION: Chronic | ICD-10-CM

## 2023-02-13 RX ORDER — METOPROLOL SUCCINATE 25 MG/1
TABLET, EXTENDED RELEASE ORAL
Qty: 30 TABLET | Refills: 4 | Status: SHIPPED | OUTPATIENT
Start: 2023-02-13

## 2023-03-21 DIAGNOSIS — E79.0 ASYMPTOMATIC HYPERURICEMIA: Chronic | ICD-10-CM

## 2023-03-21 DIAGNOSIS — E78.2 MIXED HYPERLIPIDEMIA: Chronic | ICD-10-CM

## 2023-03-21 RX ORDER — ALLOPURINOL 300 MG/1
TABLET ORAL
Qty: 30 TABLET | Refills: 5 | Status: SHIPPED | OUTPATIENT
Start: 2023-03-21

## 2023-03-21 RX ORDER — EZETIMIBE AND SIMVASTATIN 10; 40 MG/1; MG/1
TABLET ORAL
Qty: 30 TABLET | Refills: 5 | Status: SHIPPED | OUTPATIENT
Start: 2023-03-21

## 2023-05-03 ENCOUNTER — LAB (OUTPATIENT)
Dept: LAB | Facility: HOSPITAL | Age: 53
End: 2023-05-03
Payer: COMMERCIAL

## 2023-05-03 PROCEDURE — 80053 COMPREHEN METABOLIC PANEL: CPT | Performed by: INTERNAL MEDICINE

## 2023-05-03 PROCEDURE — 83704 LIPOPROTEIN BLD QUAN PART: CPT | Performed by: INTERNAL MEDICINE

## 2023-05-03 PROCEDURE — 84443 ASSAY THYROID STIM HORMONE: CPT | Performed by: INTERNAL MEDICINE

## 2023-05-03 PROCEDURE — 80061 LIPID PANEL: CPT | Performed by: INTERNAL MEDICINE

## 2023-05-03 PROCEDURE — 84481 FREE ASSAY (FT-3): CPT | Performed by: INTERNAL MEDICINE

## 2023-05-03 PROCEDURE — 84439 ASSAY OF FREE THYROXINE: CPT | Performed by: INTERNAL MEDICINE

## 2023-05-03 PROCEDURE — 83036 HEMOGLOBIN GLYCOSYLATED A1C: CPT | Performed by: INTERNAL MEDICINE

## 2023-05-03 PROCEDURE — 82306 VITAMIN D 25 HYDROXY: CPT | Performed by: INTERNAL MEDICINE

## 2023-05-03 PROCEDURE — 82550 ASSAY OF CK (CPK): CPT | Performed by: INTERNAL MEDICINE

## 2023-06-05 ENCOUNTER — TELEPHONE (OUTPATIENT)
Dept: INTERNAL MEDICINE | Facility: CLINIC | Age: 53
End: 2023-06-05

## 2023-06-05 NOTE — TELEPHONE ENCOUNTER
Caller: EZEQUIEL RAMON    Relationship to patient: PATIENT    Best call back number: [phone]     Patient is needing: PATIENT IS CALLING TO ASK ABOUT HIS 06/23/23 APPOINTMENT THAT WAS SCHEDULED.  HE IS CONCERNED WITH LAB RESULTS DONE ON 05/03/2023.  HE IS WANTING TO  KNOW IF ANOTHER PROVIDER WOULD CALL HIM TO DISCUSS  HIS A1C AND BLOOD SUGAR.      PLEASE ADVISE.

## 2023-06-18 DIAGNOSIS — E55.9 VITAMIN D DEFICIENCY: Chronic | ICD-10-CM

## 2023-08-14 ENCOUNTER — TELEPHONE (OUTPATIENT)
Dept: INTERNAL MEDICINE | Facility: CLINIC | Age: 53
End: 2023-08-14
Payer: COMMERCIAL

## 2023-08-14 NOTE — TELEPHONE ENCOUNTER
Pt is needing to be seen for an Urgent Care follow up this week do you see a spot I can schedule him?

## 2023-08-15 ENCOUNTER — APPOINTMENT (OUTPATIENT)
Dept: CT IMAGING | Facility: HOSPITAL | Age: 53
End: 2023-08-15
Payer: COMMERCIAL

## 2023-08-15 ENCOUNTER — APPOINTMENT (OUTPATIENT)
Dept: GENERAL RADIOLOGY | Facility: HOSPITAL | Age: 53
End: 2023-08-15
Payer: COMMERCIAL

## 2023-08-15 ENCOUNTER — APPOINTMENT (OUTPATIENT)
Dept: MRI IMAGING | Facility: HOSPITAL | Age: 53
End: 2023-08-15
Payer: COMMERCIAL

## 2023-08-15 ENCOUNTER — HOSPITAL ENCOUNTER (OUTPATIENT)
Facility: HOSPITAL | Age: 53
Setting detail: OBSERVATION
Discharge: HOME | End: 2023-08-17
Attending: STUDENT IN AN ORGANIZED HEALTH CARE EDUCATION/TRAINING PROGRAM | Admitting: EMERGENCY MEDICINE
Payer: COMMERCIAL

## 2023-08-15 DIAGNOSIS — R42 DIZZINESS: Primary | ICD-10-CM

## 2023-08-15 PROBLEM — R51.9 HEADACHE: Status: ACTIVE | Noted: 2023-08-15

## 2023-08-15 LAB
ALBUMIN SERPL-MCNC: 4.5 G/DL (ref 3.5–5.2)
ALBUMIN/GLOB SERPL: 1.5 G/DL
ALP SERPL-CCNC: 61 U/L (ref 39–117)
ALT SERPL W P-5'-P-CCNC: 57 U/L (ref 1–41)
ANION GAP SERPL CALCULATED.3IONS-SCNC: 11.3 MMOL/L (ref 5–15)
APTT PPP: 24 SECONDS (ref 22.7–35.4)
AST SERPL-CCNC: 33 U/L (ref 1–40)
BASOPHILS # BLD AUTO: 0.01 10*3/MM3 (ref 0–0.2)
BASOPHILS NFR BLD AUTO: 0.1 % (ref 0–1.5)
BILIRUB SERPL-MCNC: 0.6 MG/DL (ref 0–1.2)
BILIRUB UR QL STRIP: NEGATIVE
BUN SERPL-MCNC: 12 MG/DL (ref 6–20)
BUN/CREAT SERPL: 14.5 (ref 7–25)
CALCIUM SPEC-SCNC: 10 MG/DL (ref 8.6–10.5)
CHLORIDE SERPL-SCNC: 100 MMOL/L (ref 98–107)
CHOLEST SERPL-MCNC: 127 MG/DL (ref 0–200)
CLARITY UR: CLEAR
CO2 SERPL-SCNC: 28.7 MMOL/L (ref 22–29)
COLOR UR: YELLOW
CREAT SERPL-MCNC: 0.83 MG/DL (ref 0.76–1.27)
DEPRECATED RDW RBC AUTO: 44.6 FL (ref 37–54)
EGFRCR SERPLBLD CKD-EPI 2021: 105.3 ML/MIN/1.73
EOSINOPHIL # BLD AUTO: 0.19 10*3/MM3 (ref 0–0.4)
EOSINOPHIL NFR BLD AUTO: 2.5 % (ref 0.3–6.2)
ERYTHROCYTE [DISTWIDTH] IN BLOOD BY AUTOMATED COUNT: 13 % (ref 12.3–15.4)
FLUAV SUBTYP SPEC NAA+PROBE: NOT DETECTED
FLUBV RNA ISLT QL NAA+PROBE: NOT DETECTED
GLOBULIN UR ELPH-MCNC: 3.1 GM/DL
GLUCOSE BLDC GLUCOMTR-MCNC: 175 MG/DL (ref 70–130)
GLUCOSE SERPL-MCNC: 114 MG/DL (ref 65–99)
GLUCOSE UR STRIP-MCNC: NEGATIVE MG/DL
HBA1C MFR BLD: 6.4 % (ref 4.8–5.6)
HCT VFR BLD AUTO: 47.1 % (ref 37.5–51)
HDLC SERPL-MCNC: 35 MG/DL (ref 40–60)
HGB BLD-MCNC: 15.1 G/DL (ref 13–17.7)
HGB UR QL STRIP.AUTO: ABNORMAL
IMM GRANULOCYTES # BLD AUTO: 0.01 10*3/MM3 (ref 0–0.05)
IMM GRANULOCYTES NFR BLD AUTO: 0.1 % (ref 0–0.5)
INR PPP: 1.04 (ref 0.9–1.1)
KETONES UR QL STRIP: NEGATIVE
LDLC SERPL CALC-MCNC: 49 MG/DL (ref 0–100)
LDLC/HDLC SERPL: 1.05 {RATIO}
LEUKOCYTE ESTERASE UR QL STRIP.AUTO: NEGATIVE
LIPASE SERPL-CCNC: 144 U/L (ref 13–60)
LYMPHOCYTES # BLD AUTO: 2.34 10*3/MM3 (ref 0.7–3.1)
LYMPHOCYTES NFR BLD AUTO: 30.6 % (ref 19.6–45.3)
MCH RBC QN AUTO: 29.3 PG (ref 26.6–33)
MCHC RBC AUTO-ENTMCNC: 32.1 G/DL (ref 31.5–35.7)
MCV RBC AUTO: 91.3 FL (ref 79–97)
MONOCYTES # BLD AUTO: 0.59 10*3/MM3 (ref 0.1–0.9)
MONOCYTES NFR BLD AUTO: 7.7 % (ref 5–12)
NEUTROPHILS NFR BLD AUTO: 4.5 10*3/MM3 (ref 1.7–7)
NEUTROPHILS NFR BLD AUTO: 59 % (ref 42.7–76)
NITRITE UR QL STRIP: NEGATIVE
PH UR STRIP.AUTO: 5.5 [PH] (ref 5–8)
PLATELET # BLD AUTO: 177 10*3/MM3 (ref 140–450)
PMV BLD AUTO: 10.2 FL (ref 6–12)
POTASSIUM SERPL-SCNC: 4.2 MMOL/L (ref 3.5–5.2)
PROT SERPL-MCNC: 7.6 G/DL (ref 6–8.5)
PROT UR QL STRIP: ABNORMAL
PROTHROMBIN TIME: 13.7 SECONDS (ref 11.7–14.2)
QT INTERVAL: 351 MS
RBC # BLD AUTO: 5.16 10*6/MM3 (ref 4.14–5.8)
SARS-COV-2 RNA RESP QL NAA+PROBE: NOT DETECTED
SODIUM SERPL-SCNC: 140 MMOL/L (ref 136–145)
SP GR UR STRIP: 1.02 (ref 1–1.03)
TRIGL SERPL-MCNC: 277 MG/DL (ref 0–150)
TROPONIN T SERPL HS-MCNC: 10 NG/L
UROBILINOGEN UR QL STRIP: ABNORMAL
VLDLC SERPL-MCNC: 43 MG/DL (ref 5–40)
WBC NRBC COR # BLD: 7.64 10*3/MM3 (ref 3.4–10.8)

## 2023-08-15 PROCEDURE — 93010 ELECTROCARDIOGRAM REPORT: CPT | Performed by: INTERNAL MEDICINE

## 2023-08-15 PROCEDURE — 85730 THROMBOPLASTIN TIME PARTIAL: CPT | Performed by: NURSE PRACTITIONER

## 2023-08-15 PROCEDURE — 70450 CT HEAD/BRAIN W/O DYE: CPT

## 2023-08-15 PROCEDURE — 81003 URINALYSIS AUTO W/O SCOPE: CPT | Performed by: NURSE PRACTITIONER

## 2023-08-15 PROCEDURE — 70553 MRI BRAIN STEM W/O & W/DYE: CPT

## 2023-08-15 PROCEDURE — A9577 INJ MULTIHANCE: HCPCS | Performed by: EMERGENCY MEDICINE

## 2023-08-15 PROCEDURE — 80061 LIPID PANEL: CPT | Performed by: NURSE PRACTITIONER

## 2023-08-15 PROCEDURE — 83036 HEMOGLOBIN GLYCOSYLATED A1C: CPT | Performed by: NURSE PRACTITIONER

## 2023-08-15 PROCEDURE — 82948 REAGENT STRIP/BLOOD GLUCOSE: CPT

## 2023-08-15 PROCEDURE — 84484 ASSAY OF TROPONIN QUANT: CPT | Performed by: NURSE PRACTITIONER

## 2023-08-15 PROCEDURE — 96374 THER/PROPH/DIAG INJ IV PUSH: CPT

## 2023-08-15 PROCEDURE — G0378 HOSPITAL OBSERVATION PER HR: HCPCS

## 2023-08-15 PROCEDURE — 80053 COMPREHEN METABOLIC PANEL: CPT | Performed by: NURSE PRACTITIONER

## 2023-08-15 PROCEDURE — 99284 EMERGENCY DEPT VISIT MOD MDM: CPT

## 2023-08-15 PROCEDURE — 83690 ASSAY OF LIPASE: CPT | Performed by: NURSE PRACTITIONER

## 2023-08-15 PROCEDURE — 93005 ELECTROCARDIOGRAM TRACING: CPT | Performed by: NURSE PRACTITIONER

## 2023-08-15 PROCEDURE — 99285 EMERGENCY DEPT VISIT HI MDM: CPT | Performed by: NURSE PRACTITIONER

## 2023-08-15 PROCEDURE — 87636 SARSCOV2 & INF A&B AMP PRB: CPT | Performed by: NURSE PRACTITIONER

## 2023-08-15 PROCEDURE — 25010000002 LORAZEPAM PER 2 MG: Performed by: EMERGENCY MEDICINE

## 2023-08-15 PROCEDURE — 0 GADOBENATE DIMEGLUMINE 529 MG/ML SOLUTION: Performed by: EMERGENCY MEDICINE

## 2023-08-15 PROCEDURE — 85025 COMPLETE CBC W/AUTO DIFF WBC: CPT | Performed by: NURSE PRACTITIONER

## 2023-08-15 PROCEDURE — 85610 PROTHROMBIN TIME: CPT | Performed by: NURSE PRACTITIONER

## 2023-08-15 PROCEDURE — 71045 X-RAY EXAM CHEST 1 VIEW: CPT

## 2023-08-15 RX ORDER — SODIUM CHLORIDE 0.9 % (FLUSH) 0.9 %
10 SYRINGE (ML) INJECTION EVERY 12 HOURS SCHEDULED
Status: DISCONTINUED | OUTPATIENT
Start: 2023-08-15 | End: 2023-08-17 | Stop reason: HOSPADM

## 2023-08-15 RX ORDER — ASPIRIN 325 MG
325 TABLET ORAL DAILY
Status: DISCONTINUED | OUTPATIENT
Start: 2023-08-15 | End: 2023-08-17 | Stop reason: HOSPADM

## 2023-08-15 RX ORDER — LORAZEPAM 2 MG/ML
1 INJECTION INTRAMUSCULAR ONCE
Status: COMPLETED | OUTPATIENT
Start: 2023-08-15 | End: 2023-08-15

## 2023-08-15 RX ORDER — ALLOPURINOL 100 MG/1
300 TABLET ORAL DAILY
Status: DISCONTINUED | OUTPATIENT
Start: 2023-08-16 | End: 2023-08-17 | Stop reason: HOSPADM

## 2023-08-15 RX ORDER — ONDANSETRON 2 MG/ML
4 INJECTION INTRAMUSCULAR; INTRAVENOUS EVERY 6 HOURS PRN
Status: DISCONTINUED | OUTPATIENT
Start: 2023-08-15 | End: 2023-08-17 | Stop reason: HOSPADM

## 2023-08-15 RX ORDER — SODIUM CHLORIDE 0.9 % (FLUSH) 0.9 %
10 SYRINGE (ML) INJECTION AS NEEDED
Status: DISCONTINUED | OUTPATIENT
Start: 2023-08-15 | End: 2023-08-17 | Stop reason: HOSPADM

## 2023-08-15 RX ORDER — ATORVASTATIN CALCIUM 20 MG/1
40 TABLET, FILM COATED ORAL NIGHTLY
Status: DISCONTINUED | OUTPATIENT
Start: 2023-08-15 | End: 2023-08-17 | Stop reason: HOSPADM

## 2023-08-15 RX ORDER — METOPROLOL SUCCINATE 25 MG/1
25 TABLET, EXTENDED RELEASE ORAL EVERY MORNING
Status: DISCONTINUED | OUTPATIENT
Start: 2023-08-16 | End: 2023-08-17 | Stop reason: HOSPADM

## 2023-08-15 RX ORDER — ASPIRIN 300 MG/1
300 SUPPOSITORY RECTAL DAILY
Status: DISCONTINUED | OUTPATIENT
Start: 2023-08-15 | End: 2023-08-17 | Stop reason: HOSPADM

## 2023-08-15 RX ORDER — SODIUM CHLORIDE 9 MG/ML
40 INJECTION, SOLUTION INTRAVENOUS AS NEEDED
Status: DISCONTINUED | OUTPATIENT
Start: 2023-08-15 | End: 2023-08-17 | Stop reason: HOSPADM

## 2023-08-15 RX ADMIN — LORAZEPAM 1 MG: 2 INJECTION INTRAMUSCULAR; INTRAVENOUS at 22:03

## 2023-08-15 RX ADMIN — GADOBENATE DIMEGLUMINE 20 ML: 529 INJECTION, SOLUTION INTRAVENOUS at 22:52

## 2023-08-15 RX ADMIN — Medication 10 ML: at 22:03

## 2023-08-15 NOTE — ED NOTES
Report called to Hansa SMITH at [facility] Obs unit at this time

## 2023-08-15 NOTE — FSED PROVIDER NOTE
"Subjective   History of Present Illness  Patient is a 53-year-old male who presents complaining of dizziness for the last 3 weeks.  States approximately a year ago he started having \"brain fog and forgetfulness\" and 2 months ago began experiencing headaches, denies any prior headache history.  States within the last 3 weeks he has been having intermittent dizzy spells where he feels like the room is spinning but reports they do not last more than a couple seconds at a time.  States these happen at rest.  He also reports \"smelling things that are not there\".  He denies any sensory deficits, focal weakness.  He denies any current chest pain, nausea, vomiting.  Denies any vision changes.  Denies any prior history of stroke.    Review of Systems   Neurological:  Positive for dizziness and headaches.   All other systems reviewed and are negative.    Past Medical History:   Diagnosis Date    Allergic rhinitis 1/22/2016    Patient has never had allergy testing.  Symptoms are seasonal.    Asymptomatic hyperuricemia 8/25/2011 08/08/2015--patient seen in follow-up tolerated the lower well.  Uric acid upper limit of normal at 6.7.  Uloric increased to 80 mg per day.  06/08/2015--uric acid not at goal.  Started on Uloric 40 mg per day.  08/25/2011--patient was noted to have elevated uric acid but has never had an episode of gout.  Allopurinol was initiated in hopes that this would reduce kidney stones.    Benign essential hypertension 5/28/2008 02/16/2016--patient has lost about 60 pounds and his blood pressure is running low on half a dose of Benicar HCT 40/25.  Blood pressure medications discontinued except for metoprolol.  05/28/2008--treatment for hypertension begun.    Borderline Hypogonadism male 8/25/2011 03/21/2017--patient seen in follow-up and he feels well and does not feel any different off of TRT.  His total testosterone is borderline low at 347.  His free and weakly bound testosterone is in the normal " range at 77.0.  Patient needs to stay off of testosterone replacement therapy and we will continue to monitor.  06/30/2016--patient seen for a routine physical examination.  He reports that TRT    Chronic bilateral low back pain without sciatica 6/19/2010    10/17/2014--patient presents with a five-day history of low back pain without radiation into the lower extremities.  No known trauma or injury.  He was making up the bed when this occurred.  No bowel or bladder symptoms.  Patient has a known history of herniated nucleus pulposus and bulging disks.  He has been taking some generic Aleve and this has not really helped much.  He had some leftover hyd    Elevated liver enzymes 7/27/2017 02/06/2019--routine physical.  AST normal at 28, ALT mildly elevated at 60.  Suspect early PAGE despite normal liver ultrasound.  07/27/2017--routine physical.  AST elevated at 52, ALT elevated at 97.  Hepatitis C antibody screening and hepatitis B surface antigen ordered.  Repeat CMP ordered.  Ultrasound liver.  Hepatitis C and hepatitis B returned negative.  Repeat CMP showed improved liver enzy    History of nephrolithiasis 7/19/2011 08/25/2011-- allopurinol initiated for hyperuricemia.  07/19/2011--patient presented with right flank pain with radiation into the right back. CT scan of the abdomen revealed a 4 mm stone in the dependent aspect of the urinary bladder.  Likely recent passage from the right ureter where there is mild right hydronephrosis.  1.6 cm nonobstructing stone at the lower pole of the left kidney and a punctate nonobstructing stone at the upper pole of the left kidney.  Patient passed the stone spontaneously.  Calcium oxalate.    Hyperlipidemia 9/28/2008 09/28/2008--treatment for hyperlipidemia begun    Impaired fasting glucose 4/1/2011 04/01/2011--initial diagnosis of impaired fasting glucose.  Initial hemoglobin A1c normal at 5.5.  A few months later 5.7.  Has never been significantly elevated.     Male erectile disorder 1/22/2016    Morbid obesity 1/22/2016    Multiple environmental allergies 1/22/2016    Patient has never had allergy testing.  Symptoms are seasonal.    Obstructive sleep apnea, 2008--tolerates CPAP well. 1/22/2016    Diagnosed in spring of 2008.  Tolerates CPAP well.    Subclinical hypothyroidism 2/6/2019 02/06/2019--routine physical.  TSH is slightly elevated at 4.53.  Free T3 and free T4 are normal.  Close observation.    Vitamin D deficiency 1/22/2016       No Known Allergies    Past Surgical History:   Procedure Laterality Date    COLONOSCOPY N/A 03/18/2022 March 18, 2022--colonoscopy revealed normal terminal ileum.  Few medium mouth diverticuli were found in the sigmoid colon.  Nonbleeding internal hemorrhoids grade 2.    KIDNEY SURGERY Left     Age [age].  Patient had some sort of swelling of his left kidney, possibly a cyst.  Surgical resection.  No further problems.       Family History   Problem Relation Age of Onset    Alcohol abuse Father     Heart failure Father     Emphysema Father     Diabetes Father     Diabetes Sister        Social History     Socioeconomic History    Marital status:     Number of children: 0    Highest education level: Some college, no degree   Tobacco Use    Smoking status: Never    Smokeless tobacco: Never   Vaping Use    Vaping Use: Never used   Substance and Sexual Activity    Alcohol use: Yes     Comment: Social alcohol consumption    Drug use: No    Sexual activity: Yes     Partners: Female           Objective   Physical Exam  Vitals and nursing note reviewed.   Constitutional:       Appearance: Normal appearance.   HENT:      Head: Normocephalic and atraumatic.   Cardiovascular:      Rate and Rhythm: Normal rate and regular rhythm.      Pulses: Normal pulses.      Heart sounds: Normal heart sounds.   Pulmonary:      Effort: Pulmonary effort is normal.      Breath sounds: Normal breath sounds.   Abdominal:      General: Abdomen is flat.       Palpations: Abdomen is soft.      Tenderness: There is no abdominal tenderness.   Musculoskeletal:      Cervical back: Normal range of motion and neck supple.   Skin:     General: Skin is warm and dry.      Capillary Refill: Capillary refill takes less than 2 seconds.   Neurological:      General: No focal deficit present.      Mental Status: He is alert and oriented to person, place, and time.       Procedures           ED Course                                           Medical Decision Making  Head CT and chest x-ray negative for acute findings.  Patient has NIH of 0, no cerebellar signs.  EKG is normal sinus with a rate of 93, no STEMI or acute ischemic change noted.  Laboratory findings unremarkable, negative Trope.  Spoke with Dr. Kelly with neurology who recommends admission for MRI.  Patient initially was going to sign out AMA due to work obligations but called his boss and states he is willing to be admitted.  Will be admitted to the observation unit at [facility] for an MRI and neuro consult.  Dr. Butt is accepting.    Problems Addressed:  Dizziness: complicated acute illness or injury    Amount and/or Complexity of Data Reviewed  Labs: ordered.  Radiology: ordered.  ECG/medicine tests: ordered.    Risk  OTC drugs.  Prescription drug management.  Decision regarding hospitalization.        Final diagnoses:   Dizziness       ED Disposition  ED Disposition       ED Disposition   Decision to Admit    Condition   --    Comment   Level of Care: Observation Unit [28]   Diagnosis: Dizziness [[id number]]   Admitting Physician: GIUSEPPE BUTT [[id number]   Attending Physician: GIUSEPPE BUTT [[id number]]                 No follow-up provider specified.         Medication List      No changes were made to your prescriptions during this visit.         Attestation:  Brook Pelaez MD - Patient was evaluated and treated by the midlevel. I was not involved in this patient's care. I was available in the ER for consult at the  time.

## 2023-08-15 NOTE — PLAN OF CARE
Goal Outcome Evaluation:  Plan of Care Reviewed With: patient        Progress: improving  Outcome Evaluation: Patient is alert and oriented times 4. Admitted to observation for intermittent dizziness. On room air. Blood pressure slighlty elevated. Plan for patient to get an MRI. Neuro consult.NIHSS:0

## 2023-08-15 NOTE — TELEPHONE ENCOUNTER
Spoke with pt he said his symptoms are getting worse as far as dizziness he said he is going to ER today.

## 2023-08-15 NOTE — ED NOTES
Patient told NP that he wanted to leave AMA because he had work commitments. When this RN went in to room with forms, patient stated he was talking with his boss to possibly move things around so he could be admitted. Jennifer RICHARDSON notified.

## 2023-08-15 NOTE — ED NOTES
"Nursing report ED to floor  Victoriano Rowe  53 y.o.  male    HPI :   Chief Complaint   Patient presents with    Headache    Dizziness       Admitting doctor:   Justin Uriarte MD    Admitting diagnosis:   The encounter diagnosis was Dizziness.    Code status:   Current Code Status       Date Active Code Status Order ID Comments User Context       8/15/2023 1636 CPR (Attempt to Resuscitate) [id number]  [facility]        Question Answer    Code Status (Patient has no pulse and is not breathing) CPR (Attempt to Resuscitate)    Medical Interventions (Patient has pulse or is breathing) Full Support    Level Of Support Discussed With Patient                    Allergies:   Patient has no known allergies.    Isolation:   No active isolations    Intake and Output  No intake or output data in the 24 hours ending 08/15/23 1705    Weight:       08/15/23  1507   Weight: (!) 154 kg (340 lb)       Most recent vitals:   Vitals:    08/15/23 1507   BP: (!) 148/102   BP Location: Left arm   Patient Position: Sitting   Pulse: 90   Resp: 18   Temp: 98.4 øF (36.9 øC)   TempSrc: Oral   SpO2: 95%   Weight: (!) 154 kg (340 lb)   Height: 185.4 cm (73\")       Active LDAs/IV Access:   Lines, Drains & Airways       Active LDAs       Name Placement date Placement time Site Days    Peripheral IV 08/15/23 1514 Right Antecubital 08/15/23  1514  Antecubital  less than 1                    Labs (abnormal labs have a star):   Labs Reviewed   COMPREHENSIVE METABOLIC PANEL - Abnormal; Notable for the following components:       Result Value    Glucose 114 (*)     ALT (SGPT) 57 (*)     All other components within normal limits    Narrative:     GFR Normal >60  Chronic Kidney Disease <60  Kidney Failure <15     URINALYSIS WITHOUT MICROSCOPIC (NO CULTURE) - Abnormal; Notable for the following components:    Blood, UA Moderate (2+) (*)     Protein, UA >=300 mg/dL (3+) (*)     All other components within normal limits   LIPASE - Abnormal; " Notable for the following components:    Lipase 144 (*)     All other components within normal limits   COVID-19 AND FLU A/B, NP SWAB IN TRANSPORT MEDIA 8-12 HR TAT - Normal    Narrative:     Fact sheet for providers: https://www.fda.gov/media/455632/download    Fact sheet for patients: https://www.fda.gov/media/699490/download    Test performed by PCR.   SINGLE HSTROPONIN T - Normal    Narrative:     High Sensitive Troponin T Reference Range:  <10.0 ng/L- Negative Female for AMI  <15.0 ng/L- Negative Male for AMI  >=10 - Abnormal Female indicating possible myocardial injury.  >=15 - Abnormal Male indicating possible myocardial injury.   Clinicians would have to utilize clinical acumen, EKG, Troponin, and serial changes to determine if it is an Acute Myocardial Infarction or myocardial injury due to an underlying chronic condition.        CBC WITH AUTO DIFFERENTIAL - Normal   HEMOGLOBIN A1C   LIPID PANEL   APTT   PROTIME-INR   POCT GLUCOSE FINGERSTICK   POCT GLUCOSE FINGERSTICK   POCT GLUCOSE FINGERSTICK   POCT GLUCOSE FINGERSTICK   CBC AND DIFFERENTIAL    Narrative:     The following orders were created for panel order CBC & Differential.  Procedure                               Abnormality         Status                     ---------                               -----------         ------                     CBC Auto Differential[[id number]]        Normal              Final result                 Please view results for these tests on the individual orders.       EKG:   ECG 12 Lead Other; dizziness   Final Result   HEART RATE= 93  bpm   RR Interval= 645  ms   DC Interval= 162  ms   P Horizontal Axis=   deg   P Front Axis= -22  deg   QRSD Interval= 89  ms   QT Interval= 351  ms   QRS Axis= -26  deg   T Wave Axis= 33  deg   - OTHERWISE NORMAL ECG -   Sinus rhythm   Borderline left axis deviation   Abnormal R-wave progression, early transition   No Prior Tracing for Comparison   Electronically Signed By: Rvea  Crystal ([facility]) 15-Aug-2023 16:25:49   Date and Time of Study: 2023-08-15 16:03:35          Meds given in ED:   Medications   sodium chloride 0.9 % flush 10 mL (has no administration in time range)   sodium chloride 0.9 % flush 10 mL (has no administration in time range)   sodium chloride 0.9 % flush 10 mL (has no administration in time range)   sodium chloride 0.9 % infusion 40 mL (has no administration in time range)   atorvastatin (LIPITOR) tablet 40 mg (has no administration in time range)   ondansetron (ZOFRAN) injection 4 mg (has no administration in time range)   aspirin tablet 325 mg (has no administration in time range)     Or   aspirin suppository 300 mg (has no administration in time range)       Imaging results:  XR Chest 1 View    Result Date: 8/15/2023  No focal pulmonary consolidation. No cardiomegaly. Follow-up as clinical indications persist.  This report was finalized on 8/15/2023 3:38 PM by Dr. Ike Castillo M.D.       Ambulatory status:   - up ad scotty    Social issues:   Social History     Socioeconomic History    Marital status:     Number of children: 0    Highest education level: Some college, no degree   Tobacco Use    Smoking status: Never    Smokeless tobacco: Never   Vaping Use    Vaping Use: Never used   Substance and Sexual Activity    Alcohol use: Yes     Comment: Social alcohol consumption    Drug use: No    Sexual activity: Yes     Partners: Female       NIH Stroke Scale:       Ag Burger RN  08/15/23 17:05 EDT

## 2023-08-15 NOTE — PROGRESS NOTES
"MD ATTESTATION NOTE    The ROBLES and I have discussed this patient's history, physical exam, and treatment plan.  I have reviewed the documentation and personally had a face to face interaction with the patient. I affirm the documentation and agree with the treatment and plan.  The attached note describes my personal findings.      I provided a substantive portion of the care of the patient.  I personally performed the physical exam in its entirety, and below are my findings.      Brief HPI: Patient is being admitted for further evaluation of intermittent headaches, dizziness, and forgetfulness.  Patient complains of intermittent forgetfulness and \"brain fog\" for the past year or so.  He also complains of intermittent headaches for the past several months.  Denies history of migraines.  He reports intermittent dizziness for the past few weeks.  He feels lightheaded and off-balance.  Symptoms are brief.  Nothing makes it better or worse.  Over the past few days, he reports noticing \"bad odors\" that no one else can smell.  Denies vision changes, chest pain, syncope, or numbness/tingling/weakness in his extremities.    PHYSICAL EXAM  ED Triage Vitals [08/15/23 1507]   Temp Heart Rate Resp BP SpO2   98.4 øF (36.9 øC) 90 18 (!) 148/102 95 %      Temp src Heart Rate Source Patient Position BP Location FiO2 (%)   Oral Monitor Sitting Left arm --         GENERAL: Awake, alert, oriented x3.  Well-developed, well-nourished male.  Resting comfortably in no acute distress  HENT: nares patent  EYES: EOMI, no nystagmus  CV: regular rhythm, normal rate  RESPIRATORY: normal effort, clear to auscultation bilaterally  ABDOMEN: soft, nontender  MUSCULOSKELETAL: no deformity  NEURO: Speech is clear and fluent.  No aphasia.  No facial droop.  Normal strength and light touch sensation in all extremities.  Normal finger-nose and heel-to-shin testing bilaterally  PSYCH:  calm, cooperative  SKIN: warm, dry    Vital signs and nursing notes " reviewed.    ED work-up reviewed.  Head CT and chest x-ray were negative acute.  Lipase 144.  Labs are otherwise unremarkable      Plan: Intermittent dizziness/headaches with recent olfactory changes: Neuro exam is nonfocal.  Head CT is negative acute--> MRI brain.  Consult neurology.

## 2023-08-15 NOTE — H&P
[facility]   HISTORY AND PHYSICAL    Patient Name: Victoriano Rowe  : 1970  MRN: [id number]  Primary Care Physician:  Alex Quintanilla MD  Date of admission: 8/15/2023    Subjective   Subjective     Chief Complaint:   Chief Complaint   Patient presents with    Headache    Dizziness         HPI:    Victoriano Rowe is a 53 y.o. male, with a past medical history of SONJA, hyperlipidemia, hypertension, and hypothyroidism, presented to the emergency department with a complaint of intermittent headaches, dizziness, nasal hallucinations and intermittent forgetfulness over the past year.  He does state that he has been under an immense amount of pressure for the past 18 months and has had to deal with the death of his mother.  He states that he cannot pinpoint any activity or time that these things happen as it is all sporadic and intermittent.  He has never had a syncopal episode or had a fall due to the headache or dizziness.  He denies any history of migraine headaches.  The emergency department lab work is unremarkable other than a glucose of 114, lipase 144, triglycerides 277.  COVID and flu PCR are negative.  CT head without contrast shows no evidence of intracranial hemorrhage or focal lesion or mass effect.  EKG sinus rhythm.    Review of Systems   All systems were reviewed and negative except for:   What is mentioned above in the HPI.    Personal History     Past Medical History:   Diagnosis Date    Allergic rhinitis 2016    Patient has never had allergy testing.  Symptoms are seasonal.    Asymptomatic hyperuricemia 2015--patient seen in follow-up tolerated the lower well.  Uric acid upper limit of normal at 6.7.  Uloric increased to 80 mg per day.  2015--uric acid not at goal.  Started on Uloric 40 mg per day.  2011--patient was noted to have elevated uric acid but has never had an episode of gout.  Allopurinol was initiated in hopes that this would reduce kidney  stones.    Benign essential hypertension 5/28/2008 02/16/2016--patient has lost about 60 pounds and his blood pressure is running low on half a dose of Benicar HCT 40/25.  Blood pressure medications discontinued except for metoprolol.  05/28/2008--treatment for hypertension begun.    Borderline Hypogonadism male 8/25/2011 03/21/2017--patient seen in follow-up and he feels well and does not feel any different off of TRT.  His total testosterone is borderline low at 347.  His free and weakly bound testosterone is in the normal range at 77.0.  Patient needs to stay off of testosterone replacement therapy and we will continue to monitor.  06/30/2016--patient seen for a routine physical examination.  He reports that TRT    Chronic bilateral low back pain without sciatica 6/19/2010    10/17/2014--patient presents with a five-day history of low back pain without radiation into the lower extremities.  No known trauma or injury.  He was making up the bed when this occurred.  No bowel or bladder symptoms.  Patient has a known history of herniated nucleus pulposus and bulging disks.  He has been taking some generic Aleve and this has not really helped much.  He had some leftover hyd    Elevated liver enzymes 7/27/2017 02/06/2019--routine physical.  AST normal at 28, ALT mildly elevated at 60.  Suspect early PAGE despite normal liver ultrasound.  07/27/2017--routine physical.  AST elevated at 52, ALT elevated at 97.  Hepatitis C antibody screening and hepatitis B surface antigen ordered.  Repeat CMP ordered.  Ultrasound liver.  Hepatitis C and hepatitis B returned negative.  Repeat CMP showed improved liver enzy    History of nephrolithiasis 7/19/2011 08/25/2011-- allopurinol initiated for hyperuricemia.  07/19/2011--patient presented with right flank pain with radiation into the right back. CT scan of the abdomen revealed a 4 mm stone in the dependent aspect of the urinary bladder.  Likely recent passage from  the right ureter where there is mild right hydronephrosis.  1.6 cm nonobstructing stone at the lower pole of the left kidney and a punctate nonobstructing stone at the upper pole of the left kidney.  Patient passed the stone spontaneously.  Calcium oxalate.    Hyperlipidemia 9/28/2008 09/28/2008--treatment for hyperlipidemia begun    Impaired fasting glucose 4/1/2011 04/01/2011--initial diagnosis of impaired fasting glucose.  Initial hemoglobin A1c normal at 5.5.  A few months later 5.7.  Has never been significantly elevated.    Male erectile disorder 1/22/2016    Morbid obesity 1/22/2016    Multiple environmental allergies 1/22/2016    Patient has never had allergy testing.  Symptoms are seasonal.    Obstructive sleep apnea, 2008--tolerates CPAP well. 1/22/2016    Diagnosed in spring of 2008.  Tolerates CPAP well.    Subclinical hypothyroidism 2/6/2019 02/06/2019--routine physical.  TSH is slightly elevated at 4.53.  Free T3 and free T4 are normal.  Close observation.    Vitamin D deficiency 1/22/2016       Past Surgical History:   Procedure Laterality Date    COLONOSCOPY N/A 03/18/2022 March 18, 2022--colonoscopy revealed normal terminal ileum.  Few medium mouth diverticuli were found in the sigmoid colon.  Nonbleeding internal hemorrhoids grade 2.    KIDNEY SURGERY Left     Age [age].  Patient had some sort of swelling of his left kidney, possibly a cyst.  Surgical resection.  No further problems.       Family History: family history includes Alcohol abuse in his father; Diabetes in his father and sister; Emphysema in his father; Heart failure in his father. Otherwise pertinent FHx was reviewed and not pertinent to current issue.    Social History:  reports that he has never smoked. He has never used smokeless tobacco. He reports current alcohol use. He reports that he does not use drugs.    Home Medications:  Vitamin D3, allopurinol, aspirin, ezetimibe-simvastatin, metoprolol succinate  XL, multivitamin, and sildenafil    Allergies:  No Known Allergies    Objective   Objective     Vitals:   Temp:  [98.4 øF (36.9 øC)-99.5 øF (37.5 øC)] 99 øF (37.2 øC)  Heart Rate:  [] 101  Resp:  [18-19] 19  BP: (130-148)/() 130/90  Physical Exam    Constitutional: Awake, alert   Eyes: PERRLA, sclerae anicteric, no conjunctival injection   HENT: NCAT, mucous membranes moist   Neck: Supple, no thyromegaly, no lymphadenopathy, trachea midline   Respiratory: Clear to auscultation bilaterally, nonlabored respirations    Cardiovascular: RRR, no murmurs, rubs, or gallops, palpable pedal pulses bilaterally   Gastrointestinal: Positive bowel sounds, soft, nontender, nondistended   Musculoskeletal: No bilateral ankle edema, no clubbing or cyanosis to extremities   Psychiatric: Appropriate affect, cooperative   Neurologic: Oriented x 3, strength symmetric in all extremities, Cranial Nerves grossly intact to confrontation, speech clear   Skin: No rashes     Result Review    Result Review:  I have personally reviewed the results from the time of this admission to 8/15/2023 19:29 EDT and agree with these findings:  [x]  Laboratory list / accordion  []  Microbiology  [x]  Radiology  [x]  EKG/Telemetry   []  Cardiology/Vascular   []  Pathology  [x]  Old records  []  Other:      Assessment & Plan   Assessment / Plan     Brief Patient Summary:  Victoriano Rowe is a 53 y.o. male who was admitted to the observation unit for further evaluation and treatment of his headache.    Active Hospital Problems:  Active Hospital Problems    Diagnosis     **Headache     Dizziness      Plan:     Headache/dizziness  -Neurochecks every 4 hours   -Vital signs every 4 hours   -Cardiac monitoring   -MRI-pending  -CT Head -no evidence of intracranial hemorrhage or lesion or mass effect  -Neuro consult   -ECHO- Pending    Hypertension   -Monitor blood pressure  -Continue metoprolol    Hyperlipidemia  -Continue atorvastatin      DVT  prophylaxis:  Mechanical DVT prophylaxis orders are present.    CODE STATUS:    Level Of Support Discussed With: Patient  Code Status (Patient has no pulse and is not breathing): CPR (Attempt to Resuscitate)  Medical Interventions (Patient has pulse or is breathing): Full Support    Admission Status:  I believe this patient meets observation status.    78 minutes have been spent by [facility] providers in the care of this patient while under observation status.      Appropriate PPE worn during patient encounter.  Hand hygeine performed before and after seeing the patient.      Electronically signed by YOLI Diggs, 08/15/23, 7:29 PM EDT.

## 2023-08-15 NOTE — DISCHARGE INSTRUCTIONS
Begin Keppra as prescribed.  Follow-up with neurology, their office will call you to schedule.  Follow-up with your primary care provider.  Return to the emergency department with worsening symptoms, uncontrolled pain, inability to tolerate oral liquids, fever greater than 101øF not controlled by Tylenol or as needed with emergent concerns.

## 2023-08-16 ENCOUNTER — APPOINTMENT (OUTPATIENT)
Dept: CARDIOLOGY | Facility: HOSPITAL | Age: 53
End: 2023-08-16
Payer: COMMERCIAL

## 2023-08-16 ENCOUNTER — APPOINTMENT (OUTPATIENT)
Dept: NEUROLOGY | Facility: HOSPITAL | Age: 53
End: 2023-08-16
Payer: COMMERCIAL

## 2023-08-16 LAB
ALBUMIN SERPL-MCNC: 4.3 G/DL (ref 3.5–5.2)
ALBUMIN/GLOB SERPL: 1.5 G/DL
ALP SERPL-CCNC: 56 U/L (ref 39–117)
ALT SERPL W P-5'-P-CCNC: 50 U/L (ref 1–41)
AMMONIA BLD-SCNC: 28 UMOL/L (ref 16–60)
ANION GAP SERPL CALCULATED.3IONS-SCNC: 11.7 MMOL/L (ref 5–15)
AORTIC DIMENSIONLESS INDEX: 0.9 (DI)
AST SERPL-CCNC: 33 U/L (ref 1–40)
BH CV ECHO MEAS - AO MAX PG: 7 MMHG
BH CV ECHO MEAS - AO MEAN PG: 3.7 MMHG
BH CV ECHO MEAS - AO ROOT DIAM: 3.5 CM
BH CV ECHO MEAS - AO V2 MAX: 130 CM/SEC
BH CV ECHO MEAS - AO V2 VTI: 24.8 CM
BH CV ECHO MEAS - AVA(I,D): 4 CM2
BH CV ECHO MEAS - EDV(CUBED): 159 ML
BH CV ECHO MEAS - EDV(MOD-SP2): 45 ML
BH CV ECHO MEAS - EDV(MOD-SP4): 89 ML
BH CV ECHO MEAS - EF(MOD-BP): 67 %
BH CV ECHO MEAS - EF(MOD-SP2): 64.4 %
BH CV ECHO MEAS - EF(MOD-SP4): 64 %
BH CV ECHO MEAS - ESV(CUBED): 52.5 ML
BH CV ECHO MEAS - ESV(MOD-SP2): 16 ML
BH CV ECHO MEAS - ESV(MOD-SP4): 32 ML
BH CV ECHO MEAS - FS: 30.9 %
BH CV ECHO MEAS - IVS/LVPW: 1.16 CM
BH CV ECHO MEAS - IVSD: 1.22 CM
BH CV ECHO MEAS - LA A2CS (ATRIAL LENGTH): 5.2 CM
BH CV ECHO MEAS - LA A4C LENGTH: 4.8 CM
BH CV ECHO MEAS - LV DIASTOLIC VOL/BSA (35-75): 33.1 CM2
BH CV ECHO MEAS - LV MASS(C)D: 246.1 GRAMS
BH CV ECHO MEAS - LV MAX PG: 4.9 MMHG
BH CV ECHO MEAS - LV MEAN PG: 2.6 MMHG
BH CV ECHO MEAS - LV SYSTOLIC VOL/BSA (12-30): 11.9 CM2
BH CV ECHO MEAS - LV V1 MAX: 110 CM/SEC
BH CV ECHO MEAS - LV V1 VTI: 21.8 CM
BH CV ECHO MEAS - LVIDD: 5.4 CM
BH CV ECHO MEAS - LVIDS: 3.7 CM
BH CV ECHO MEAS - LVOT AREA: 4.6 CM2
BH CV ECHO MEAS - LVOT DIAM: 2.42 CM
BH CV ECHO MEAS - LVPWD: 1.05 CM
BH CV ECHO MEAS - MV A MAX VEL: 66.1 CM/SEC
BH CV ECHO MEAS - MV DEC SLOPE: 620.1 CM/SEC2
BH CV ECHO MEAS - MV DEC TIME: 0.17 MSEC
BH CV ECHO MEAS - MV E MAX VEL: 71.5 CM/SEC
BH CV ECHO MEAS - MV E/A: 1.08
BH CV ECHO MEAS - MV MAX PG: 2.2 MMHG
BH CV ECHO MEAS - MV MEAN PG: 1.29 MMHG
BH CV ECHO MEAS - MV P1/2T: 45 MSEC
BH CV ECHO MEAS - MV V2 VTI: 22 CM
BH CV ECHO MEAS - MVA(P1/2T): 4.9 CM2
BH CV ECHO MEAS - MVA(VTI): 4.6 CM2
BH CV ECHO MEAS - PA ACC SLOPE: 830.7 CM/SEC2
BH CV ECHO MEAS - PA ACC TIME: 0.1 SEC
BH CV ECHO MEAS - PA V2 MAX: 106 CM/SEC
BH CV ECHO MEAS - PULM A REVS DUR: 0.11 SEC
BH CV ECHO MEAS - PULM A REVS VEL: 24.3 CM/SEC
BH CV ECHO MEAS - PULM DIAS VEL: 50 CM/SEC
BH CV ECHO MEAS - PULM S/D: 1.05
BH CV ECHO MEAS - PULM SYS VEL: 52.5 CM/SEC
BH CV ECHO MEAS - QP/QS: 0.92
BH CV ECHO MEAS - RV MAX PG: 2.28 MMHG
BH CV ECHO MEAS - RV V1 MAX: 75.5 CM/SEC
BH CV ECHO MEAS - RV V1 VTI: 13 CM
BH CV ECHO MEAS - RVOT DIAM: 3 CM
BH CV ECHO MEAS - SI(MOD-SP2): 10.8 ML/M2
BH CV ECHO MEAS - SI(MOD-SP4): 21.2 ML/M2
BH CV ECHO MEAS - SV(LVOT): 100.2 ML
BH CV ECHO MEAS - SV(MOD-SP2): 29 ML
BH CV ECHO MEAS - SV(MOD-SP4): 57 ML
BH CV ECHO MEAS - SV(RVOT): 92 ML
BH CV ECHO MEAS - TAPSE (>1.6): 2.6 CM
BH CV ECHO SHUNT ASSESSMENT PERFORMED (HIDDEN SCRIPTING): 1
BH CV XLRA - RV BASE: 3.4 CM
BH CV XLRA - RV LENGTH: 7.4 CM
BH CV XLRA - RV MID: 3.1 CM
BH CV XLRA - TDI S': 16 CM/SEC
BILIRUB SERPL-MCNC: 0.7 MG/DL (ref 0–1.2)
BUN SERPL-MCNC: 12 MG/DL (ref 6–20)
BUN/CREAT SERPL: 13 (ref 7–25)
CALCIUM SPEC-SCNC: 9.3 MG/DL (ref 8.6–10.5)
CHLORIDE SERPL-SCNC: 100 MMOL/L (ref 98–107)
CO2 SERPL-SCNC: 27.3 MMOL/L (ref 22–29)
CREAT SERPL-MCNC: 0.92 MG/DL (ref 0.76–1.27)
DEPRECATED RDW RBC AUTO: 42.4 FL (ref 37–54)
EGFRCR SERPLBLD CKD-EPI 2021: 100.1 ML/MIN/1.73
ERYTHROCYTE [DISTWIDTH] IN BLOOD BY AUTOMATED COUNT: 13.1 % (ref 12.3–15.4)
FOLATE SERPL-MCNC: 13.4 NG/ML (ref 4.78–24.2)
GLOBULIN UR ELPH-MCNC: 2.9 GM/DL
GLUCOSE BLDC GLUCOMTR-MCNC: 129 MG/DL (ref 70–130)
GLUCOSE BLDC GLUCOMTR-MCNC: 145 MG/DL (ref 70–130)
GLUCOSE BLDC GLUCOMTR-MCNC: 146 MG/DL (ref 70–130)
GLUCOSE SERPL-MCNC: 117 MG/DL (ref 65–99)
HCT VFR BLD AUTO: 44.9 % (ref 37.5–51)
HGB BLD-MCNC: 14.9 G/DL (ref 13–17.7)
LEFT ATRIUM VOLUME INDEX: 18.2 ML/M2
MCH RBC QN AUTO: 29.7 PG (ref 26.6–33)
MCHC RBC AUTO-ENTMCNC: 33.2 G/DL (ref 31.5–35.7)
MCV RBC AUTO: 89.4 FL (ref 79–97)
PLATELET # BLD AUTO: 179 10*3/MM3 (ref 140–450)
PMV BLD AUTO: 10.3 FL (ref 6–12)
POTASSIUM SERPL-SCNC: 3.9 MMOL/L (ref 3.5–5.2)
PROT SERPL-MCNC: 7.2 G/DL (ref 6–8.5)
RBC # BLD AUTO: 5.02 10*6/MM3 (ref 4.14–5.8)
SODIUM SERPL-SCNC: 139 MMOL/L (ref 136–145)
TSH SERPL DL<=0.05 MIU/L-ACNC: 4.19 UIU/ML (ref 0.27–4.2)
VIT B12 BLD-MCNC: 273 PG/ML (ref 211–946)
WBC NRBC COR # BLD: 8.06 10*3/MM3 (ref 3.4–10.8)

## 2023-08-16 PROCEDURE — G0378 HOSPITAL OBSERVATION PER HR: HCPCS

## 2023-08-16 PROCEDURE — 99221 1ST HOSP IP/OBS SF/LOW 40: CPT | Performed by: PSYCHIATRY & NEUROLOGY

## 2023-08-16 PROCEDURE — 82948 REAGENT STRIP/BLOOD GLUCOSE: CPT

## 2023-08-16 PROCEDURE — 82607 VITAMIN B-12: CPT | Performed by: PHYSICIAN ASSISTANT

## 2023-08-16 PROCEDURE — 25010000002 LEVETRIRACETAM PER 10 MG: Performed by: PHYSICIAN ASSISTANT

## 2023-08-16 PROCEDURE — 80050 GENERAL HEALTH PANEL: CPT | Performed by: NURSE PRACTITIONER

## 2023-08-16 PROCEDURE — 95819 EEG AWAKE AND ASLEEP: CPT | Performed by: PSYCHIATRY & NEUROLOGY

## 2023-08-16 PROCEDURE — 95816 EEG AWAKE AND DROWSY: CPT

## 2023-08-16 PROCEDURE — 25510000001 PERFLUTREN (DEFINITY) 8.476 MG IN SODIUM CHLORIDE (PF) 0.9 % 10 ML INJECTION: Performed by: NURSE PRACTITIONER

## 2023-08-16 PROCEDURE — 93306 TTE W/DOPPLER COMPLETE: CPT | Performed by: INTERNAL MEDICINE

## 2023-08-16 PROCEDURE — 93306 TTE W/DOPPLER COMPLETE: CPT

## 2023-08-16 PROCEDURE — 82140 ASSAY OF AMMONIA: CPT | Performed by: PHYSICIAN ASSISTANT

## 2023-08-16 PROCEDURE — 82746 ASSAY OF FOLIC ACID SERUM: CPT | Performed by: PHYSICIAN ASSISTANT

## 2023-08-16 PROCEDURE — 96375 TX/PRO/DX INJ NEW DRUG ADDON: CPT

## 2023-08-16 RX ORDER — LEVETIRACETAM 500 MG/1
500 TABLET ORAL EVERY 12 HOURS SCHEDULED
Status: DISCONTINUED | OUTPATIENT
Start: 2023-08-17 | End: 2023-08-17 | Stop reason: HOSPADM

## 2023-08-16 RX ORDER — LEVETIRACETAM 500 MG/5ML
1000 INJECTION, SOLUTION, CONCENTRATE INTRAVENOUS ONCE
Status: COMPLETED | OUTPATIENT
Start: 2023-08-16 | End: 2023-08-16

## 2023-08-16 RX ADMIN — PERFLUTREN 2 ML: 6.52 INJECTION, SUSPENSION INTRAVENOUS at 12:03

## 2023-08-16 RX ADMIN — METOPROLOL SUCCINATE 25 MG: 25 TABLET, EXTENDED RELEASE ORAL at 08:20

## 2023-08-16 RX ADMIN — LEVETIRACETAM 1000 MG: 100 INJECTION INTRAVENOUS at 16:30

## 2023-08-16 RX ADMIN — ALLOPURINOL 300 MG: 100 TABLET ORAL at 08:20

## 2023-08-16 RX ADMIN — ATORVASTATIN CALCIUM 40 MG: 20 TABLET, FILM COATED ORAL at 20:30

## 2023-08-16 RX ADMIN — ASPIRIN 325 MG: 325 TABLET ORAL at 08:20

## 2023-08-16 RX ADMIN — Medication 10 ML: at 08:22

## 2023-08-16 RX ADMIN — Medication 10 ML: at 20:30

## 2023-08-16 NOTE — PROGRESS NOTES
ED OBSERVATION PROGRESS/DISCHARGE SUMMARY    Date of Admission: 8/15/2023   LOS: 0 days   PCP: Alex Quintanilla MD    Subjective patient reports feeling well this afternoon, voices no complaints.    Hospital Outcome: 53-year-old male admitted to the observation unit for further evaluation of intermittent forgetfulness, nasal hallucinations, dizziness, and headaches.  Patient had MRI brain with and without contrast that is negative acute.  Patient was seen by neurology, discussed with Dr. Becker.  She suspects patient is having seizures.  EEG obtained, currently read is pending.  We will start patient on Keppra per neurology with 1000 mg IV load followed by 500 mg p.o. twice daily.  Patient received loaded this afternoon, will observe overnight.  I anticipate patient will be discharged home tomorrow.    ROS:  General: no fevers, chills  Respiratory: no cough, dyspnea  Cardiovascular: no chest pain, palpitations  Abdomen: No abdominal pain, nausea, vomiting, or diarrhea  Neurologic: No focal weakness    Objective   Physical Exam:  I have reviewed the vital signs.  Temp:  [97.3 øF (36.3 øC)-99.5 øF (37.5 øC)] 97.3 øF (36.3 øC)  Heart Rate:  [] 81  Resp:  [18-19] 19  BP: (130-148)/() 137/90  General Appearance:    Alert, cooperative, no distress  Head:    Normocephalic, atraumatic  Eyes:    Sclerae anicteric  Neck:   Supple, no mass  Lungs: Clear to auscultation bilaterally, respirations unlabored  Heart: Regular rate and rhythm, S1 and S2 normal, no murmur, rub or gallop  Abdomen:  Soft, nontender, bowel sounds active, nondistended  Extremities: No clubbing, cyanosis, or edema to lower extremities  Pulses:  2+ and symmetric in distal lower extremities  Skin: No rashes   Neurologic: Oriented x3, Normal strength to extremities    Results Review:    I have reviewed the labs, radiology results and diagnostic studies.    Results from last 7 days   Lab Units 08/16/23  0517   WBC 10*3/mm3 8.06   HEMOGLOBIN g/dL  14.9   HEMATOCRIT % 44.9   PLATELETS 10*3/mm3 179     Results from last 7 days   Lab Units 08/16/23  0517 08/15/23  1514   SODIUM mmol/L 139 140   POTASSIUM mmol/L 3.9 4.2   CHLORIDE mmol/L 100 100   CO2 mmol/L 27.3 28.7   BUN mg/dL 12 12   CREATININE mg/dL 0.92 0.83   CALCIUM mg/dL 9.3 10.0   BILIRUBIN mg/dL 0.7 0.6   ALK PHOS U/L 56 61   ALT (SGPT) U/L 50* 57*   AST (SGOT) U/L 33 33   GLUCOSE mg/dL 117* 114*     Imaging Results (Last 24 Hours)       Procedure Component Value Units Date/Time    MRI Brain With & Without Contrast [[id number]] Collected: 08/15/23 2353     Updated: 08/15/23 2353    Narrative:        Patient: EZEQUIEL RAMON  Time Out: 23:52  Exam(s): MRI HEAD W WO Contrast     EXAM:    MR Head Without and With Intravenous Contrast    CLINICAL HISTORY:     Reason for exam: headache.    TECHNIQUE:    Magnetic resonance images of the head brain without and with   intravenous contrast in multiple planes.    COMPARISON:  Comparison made to prior head CT from August 15, 2023..    FINDINGS:    Brain:  Unremarkable.  No mass.  No hemorrhage.  No acute infarct.  The   flow voids at the base of the brain are intact.  No evidence of abnormal   enhancement.  The dural venous sinuses are patent.    Ventricles:  Unremarkable.  No ventriculomegaly.    Bones joints:  Unremarkable.    Sinuses: Chronic ethmoid sinusitis.  No acute sinusitis.    Mastoid air cells:  Unremarkable as visualized.  No mastoid effusion.    Orbits: Findings concerning for thyroid ophthalmopathy, which can be   seen in the setting of Graves' disease..    IMPRESSION:       No evidence of acute intracranial pathology.      Impression:          Electronically signed by Katie Torres MD on 08-15-23 at 2352    XR Chest 1 View [[id number]] Collected: 08/15/23 1536     Updated: 08/15/23 1541    Narrative:      XR CHEST 1 VW-     HISTORY: Male who is 53 years-old, dizziness     TECHNIQUE: Frontal view of the chest     COMPARISON: None available      FINDINGS:     The heart is mildly enlarged. Pulmonary vasculature is unremarkable. No  focal pulmonary consolidation, pleural effusion, or pneumothorax. No  acute osseous process.       Impression:      No focal pulmonary consolidation. No cardiomegaly. Follow-up  as clinical indications persist.     This report was finalized on 8/15/2023 3:38 PM by Dr. Ike Castillo M.D.       CT Head Without Contrast [[id number]] Collected: 08/15/23 1535     Updated: 08/15/23 1539    Narrative:      CT SCAN OF THE BRAIN WITHOUT CONTRAST     HISTORY: Headache. Dizziness.     The CT scan was performed as an emergency procedure through the brain  without contrast. The ventricles are normal in size and midline. There  is no evidence of intracranial hemorrhage or focal lesion or mass  effect. The visualized sinuses and mastoid air cells are clear.                 Radiation dose reduction techniques were utilized, including automated  exposure control and exposure modulation based on body size.        This report was finalized on 8/15/2023 3:36 PM by Dr. Kody Landis M.D.               I have reviewed the medications.  ---------------------------------------------------------------------------------------------  Assessment & Plan   Assessment/Problem List    Headache    Dizziness      Plan:    Headache/dizziness  Gustatory hallucinations  -Neurochecks every 4 hours   -Vital signs every 4 hours   -Cardiac monitoring   -MRI negative acute  -CT Head -no evidence of intracranial hemorrhage or lesion or mass effect  -Echo reviewed, EF 67%, otherwise unremarkable  -Neurology consult, Dr. Becker  -EEG pending  -Loaded with Keppra 1000 mg IV followed by Keppra 500 mg p.o. twice daily     Hypertension   -Monitor blood pressure  -Continue metoprolol     Hyperlipidemia  -Continue atorvastatin    Disposition: Anticipate patient will be discharged home tomorrow    52 minutes has been spent by [facility]  providers in the care of this patient while under observation status     This note will serve as progress note    BRAYAN Craig 08/16/23 07:44 EDT    I have worn appropriate PPE during this patient encounter and sanitized my hands with entering and exiting patient room.

## 2023-08-16 NOTE — PLAN OF CARE
Problem: Hypertension Comorbidity  Goal: Blood Pressure in Desired Range  8/16/2023 0557 by Radha Meadows RN  Outcome: Ongoing, Progressing  8/16/2023 0556 by Radha Meadows RN  Outcome: Ongoing, Progressing     Problem: Obstructive Sleep Apnea Risk or Actual Comorbidity Management  Goal: Unobstructed Breathing During Sleep  8/16/2023 0557 by Radha Meadows RN  Outcome: Ongoing, Progressing  8/16/2023 0556 by Radha Meadows RN  Outcome: Ongoing, Progressing     Problem: Adult Inpatient Plan of Care  Goal: Plan of Care Review  8/16/2023 0557 by Radha Meadows RN  Outcome: Ongoing, Progressing  Flowsheets (Taken 8/16/2023 0557)  Progress: improving  Plan of Care Reviewed With: patient  Outcome Evaluation: Patient had MRI performed during the shift. No evidence of acute intracranial pathology. Patient used the C-pap machine while resting. NIH is 0 and Echo is scheduled.  8/16/2023 0556 by Radha Meadows RN  Outcome: Ongoing, Progressing  Flowsheets (Taken 8/16/2023 0556)  Progress: improving  Plan of Care Reviewed With: patient  Outcome Evaluation: Patient had MRI done during the shift.  Goal: Patient-Specific Goal (Individualized)  8/16/2023 0557 by Radha Meadows RN  Outcome: Ongoing, Progressing  8/16/2023 0556 by Radha Meadows RN  Outcome: Ongoing, Progressing  Goal: Absence of Hospital-Acquired Illness or Injury  8/16/2023 0557 by Radha Meadows RN  Outcome: Ongoing, Progressing  8/16/2023 0556 by Radha Meadows RN  Outcome: Ongoing, Progressing  Intervention: Identify and Manage Fall Risk  Recent Flowsheet Documentation  Taken 8/16/2023 0400 by Radha Meadows RN  Safety Promotion/Fall Prevention:   assistive device/personal items within reach   clutter free environment maintained   fall prevention program maintained   lighting adjusted   nonskid shoes/slippers when out of bed   room organization consistent   safety round/check completed  Taken 8/16/2023 0200 by Radha Meadows RN  Safety Promotion/Fall  Prevention:   assistive device/personal items within reach   fall prevention program maintained   lighting adjusted   clutter free environment maintained   nonskid shoes/slippers when out of bed   room organization consistent   safety round/check completed  Taken 8/16/2023 0000 by Radha Meadows RN  Safety Promotion/Fall Prevention:   assistive device/personal items within reach   clutter free environment maintained   fall prevention program maintained   mobility aid in reach   nonskid shoes/slippers when out of bed   room organization consistent   safety round/check completed  Taken 8/15/2023 2200 by Radha Meadows RN  Safety Promotion/Fall Prevention:   assistive device/personal items within reach   clutter free environment maintained   fall prevention program maintained   lighting adjusted   nonskid shoes/slippers when out of bed   room organization consistent   safety round/check completed  Intervention: Prevent Skin Injury  Recent Flowsheet Documentation  Taken 8/16/2023 0400 by Radha Meadows RN  Body Position: position changed independently  Taken 8/16/2023 0200 by Radha Meadows RN  Body Position: position changed independently  Taken 8/16/2023 0000 by Radha Meadows RN  Body Position: position changed independently  Taken 8/15/2023 2200 by Radha Meadows RN  Body Position: position changed independently  Intervention: Prevent and Manage VTE (Venous Thromboembolism) Risk  Recent Flowsheet Documentation  Taken 8/16/2023 0400 by Radha Meadows RN  Activity Management: activity minimized  Taken 8/16/2023 0200 by Radha Meadows RN  Activity Management: activity minimized  Taken 8/16/2023 0000 by Radha Meadows RN  Activity Management: activity minimized  Taken 8/15/2023 2200 by Radha Meadows RN  Activity Management: activity minimized  Intervention: Prevent Infection  Recent Flowsheet Documentation  Taken 8/16/2023 0400 by Radha Meadows RN  Infection Prevention:   hand hygiene promoted   rest/sleep promoted    single patient room provided  Taken 8/16/2023 0200 by Radha Meadows RN  Infection Prevention:   hand hygiene promoted   rest/sleep promoted   single patient room provided  Taken 8/16/2023 0000 by Radha Meadows RN  Infection Prevention:   hand hygiene promoted   rest/sleep promoted   single patient room provided  Taken 8/15/2023 2200 by Radha Meadows RN  Infection Prevention:   hand hygiene promoted   rest/sleep promoted   single patient room provided  Taken 8/15/2023 2100 by Radha Meadows RN  Infection Prevention:   hand hygiene promoted   rest/sleep promoted   single patient room provided  Goal: Optimal Comfort and Wellbeing  8/16/2023 0557 by Radha Meadows RN  Outcome: Ongoing, Progressing  8/16/2023 0556 by Radha Meadows RN  Outcome: Ongoing, Progressing  Goal: Readiness for Transition of Care  8/16/2023 0557 by Radha Meadows RN  Outcome: Ongoing, Progressing  8/16/2023 0556 by Radha Meadows RN  Outcome: Ongoing, Progressing   Goal Outcome Evaluation:  Plan of Care Reviewed With: patient        Progress: improving  Outcome Evaluation: Patient had MRI performed during the shift. No evidence of acute intracranial pathology. Patient used the C-pap machine while resting. NIH is 0 and Echo is scheduled.

## 2023-08-16 NOTE — CONSULTS
NEUROLOGY CONSULT    DOS: 2023  NAME: Victoriano Rowe   : 1970  PCP: Alex Quintanilla MD  CC: Stroke  Referring MD: Alex Quintanilla MD      Neurological Problem and Interval History:   53 y.o. who presents with Sx of new onset headaches with gustatory hallucinations, decreasing memory and word finding difficulties.  Patient has no previous history of headaches does not have an otherwise overly significant medical history.  He has been having increasing frequency of gustatory hallucinations which include smelling a burning sensation like a burning ashtray.  This is most suggestive of temporal lobe seizures.  Overall this most likely represents temporal lobe seizures the gustatory hallucinations are pretty suggestive of complex partial seizures.  This was also fit in the setting of difficulty with memory and headaches as well as increasing difficulty with word finding.  This is can all occur secondary to new onset seizure disorder versus seizure disorder in the setting of early neurodegenerative disease such as dementia.  MRI brain with and without is unremarkable.      Past Medical/Surgical Hx:  Past Medical History:   Diagnosis Date    Allergic rhinitis 2016    Patient has never had allergy testing.  Symptoms are seasonal.    Asymptomatic hyperuricemia 2015--patient seen in follow-up tolerated the lower well.  Uric acid upper limit of normal at 6.7.  Uloric increased to 80 mg per day.  2015--uric acid not at goal.  Started on Uloric 40 mg per day.  2011--patient was noted to have elevated uric acid but has never had an episode of gout.  Allopurinol was initiated in hopes that this would reduce kidney stones.    Benign essential hypertension 2016--patient has lost about 60 pounds and his blood pressure is running low on half a dose of Benicar HCT 40/25.  Blood pressure medications discontinued except for metoprolol.  2008--treatment for  hypertension begun.    Borderline Hypogonadism male 8/25/2011 03/21/2017--patient seen in follow-up and he feels well and does not feel any different off of TRT.  His total testosterone is borderline low at 347.  His free and weakly bound testosterone is in the normal range at 77.0.  Patient needs to stay off of testosterone replacement therapy and we will continue to monitor.  06/30/2016--patient seen for a routine physical examination.  He reports that TRT    Chronic bilateral low back pain without sciatica 6/19/2010    10/17/2014--patient presents with a five-day history of low back pain without radiation into the lower extremities.  No known trauma or injury.  He was making up the bed when this occurred.  No bowel or bladder symptoms.  Patient has a known history of herniated nucleus pulposus and bulging disks.  He has been taking some generic Aleve and this has not really helped much.  He had some leftover hyd    Elevated liver enzymes 7/27/2017 02/06/2019--routine physical.  AST normal at 28, ALT mildly elevated at 60.  Suspect early PAGE despite normal liver ultrasound.  07/27/2017--routine physical.  AST elevated at 52, ALT elevated at 97.  Hepatitis C antibody screening and hepatitis B surface antigen ordered.  Repeat CMP ordered.  Ultrasound liver.  Hepatitis C and hepatitis B returned negative.  Repeat CMP showed improved liver enzy    History of nephrolithiasis 7/19/2011 08/25/2011-- allopurinol initiated for hyperuricemia.  07/19/2011--patient presented with right flank pain with radiation into the right back. CT scan of the abdomen revealed a 4 mm stone in the dependent aspect of the urinary bladder.  Likely recent passage from the right ureter where there is mild right hydronephrosis.  1.6 cm nonobstructing stone at the lower pole of the left kidney and a punctate nonobstructing stone at the upper pole of the left kidney.  Patient passed the stone spontaneously.  Calcium oxalate.     Hyperlipidemia 9/28/2008 09/28/2008--treatment for hyperlipidemia begun    Impaired fasting glucose 4/1/2011 04/01/2011--initial diagnosis of impaired fasting glucose.  Initial hemoglobin A1c normal at 5.5.  A few months later 5.7.  Has never been significantly elevated.    Male erectile disorder 1/22/2016    Morbid obesity 1/22/2016    Multiple environmental allergies 1/22/2016    Patient has never had allergy testing.  Symptoms are seasonal.    Obstructive sleep apnea, 2008--tolerates CPAP well. 1/22/2016    Diagnosed in spring of 2008.  Tolerates CPAP well.    Subclinical hypothyroidism 2/6/2019 02/06/2019--routine physical.  TSH is slightly elevated at 4.53.  Free T3 and free T4 are normal.  Close observation.    Vitamin D deficiency 1/22/2016     Past Surgical History:   Procedure Laterality Date    COLONOSCOPY N/A 03/18/2022 March 18, 2022--colonoscopy revealed normal terminal ileum.  Few medium mouth diverticuli were found in the sigmoid colon.  Nonbleeding internal hemorrhoids grade 2.    KIDNEY SURGERY Left     Age [age].  Patient had some sort of swelling of his left kidney, possibly a cyst.  Surgical resection.  No further problems.         Medications On Admission  Medications Prior to Admission   Medication Sig Dispense Refill Last Dose    allopurinol (ZYLOPRIM) 300 MG tablet Take 1 tablet by mouth daily for gout/elevated uric acid. 30 tablet 5 8/15/2023    Aspirin Low Dose 81 MG EC tablet Take 1 tablet by mouth daily. 90 tablet 0 8/15/2023    Cholecalciferol (Vitamin D3) 1.25 MG (56464 UT) capsule Take 1 capsule by mouth three times a week on Monday, Wednesday, and Friday. 12 capsule 0 Past Week    ezetimibe-simvastatin (VYTORIN) 10-40 MG per tablet Take 1 tablet by mouth daily for high cholesterol. **Please schedule and keep appointment for further refills.** 30 tablet 5 8/15/2023    metoprolol succinate XL (TOPROL-XL) 25 MG 24 hr tablet Take 1 tablet by mouth every morning for high blood  pressure. 30 tablet 1 8/15/2023    Multiple Vitamins tablet Take 1 tablet by mouth Daily.   8/15/2023    sildenafil (VIAGRA) 100 MG tablet Take 1 tablet by mouth Daily As Needed for erectile dysfunction. 5 tablet 5 More than a month       Allergies:  No Known Allergies    Social Hx:  Social History     Socioeconomic History    Marital status:     Number of children: 0    Highest education level: Some college, no degree   Tobacco Use    Smoking status: Never    Smokeless tobacco: Never   Vaping Use    Vaping Use: Never used   Substance and Sexual Activity    Alcohol use: Yes     Comment: Social alcohol consumption    Drug use: No    Sexual activity: Yes     Partners: Female       Family Hx:  Family History   Problem Relation Age of Onset    Alcohol abuse Father     Heart failure Father     Emphysema Father     Diabetes Father     Diabetes Sister        Review of Imaging (Interpretation of images not reports):  -MRI brain: Unremarkable    Laboratory Results:   Lab Results   Component Value Date    GLUCOSE 117 (H) 08/16/2023    CALCIUM 9.3 08/16/2023     08/16/2023    K 3.9 08/16/2023    CO2 27.3 08/16/2023     08/16/2023    BUN 12 08/16/2023    CREATININE 0.92 08/16/2023    EGFRIFAFRI 103 01/31/2020    EGFRIFNONA 83 04/07/2021    BCR 13.0 08/16/2023    ANIONGAP 11.7 08/16/2023     Lab Results   Component Value Date    WBC 8.06 08/16/2023    HGB 14.9 08/16/2023    HCT 44.9 08/16/2023    MCV 89.4 08/16/2023     08/16/2023     Lab Results   Component Value Date    CHOL 127 08/15/2023     Lab Results   Component Value Date    HDL 35 (L) 08/15/2023     Lab Results   Component Value Date    LDL 49 08/15/2023    LDL 53 06/17/2016    LDL 33 01/18/2016     Lab Results   Component Value Date    TRIG 277 (H) 08/15/2023    TRIG 203 (H) 05/03/2023    TRIG 132 10/25/2022     Lab Results   Component Value Date    HGBA1C 6.40 (H) 08/15/2023     Lab Results   Component Value Date    INR 1.04 08/15/2023     "PROTIME 13.7 08/15/2023         Physical Examination:   /90 (BP Location: Right arm, Patient Position: Standing)   Pulse 95   Temp 97.9 øF (36.6 øC) (Oral)   Resp 18   Ht 185 cm (72.84\")   Wt (!) 154 kg (339 lb 8.1 oz)   SpO2 97%   BMI 45.00 kg/mý   General Appearance:   Well developed, well nourished, well groomed, alert, and cooperative.  HEENT: Normocephalic. Atraumatic. No JVD, no tracheal deviation.  Neck and Spine: Normal range of motion.  Normal alignment. No mass or tenderness. No bruits.   Peripheral Vasculature: Radial pulses are equal and symmetric. No signs of distal embolization.  Extremities:    No edema or deformities.   Skin:    No rashes or discoloration    Neurological examination:  Higher Integrative  Function: Oriented to time, place and person.   CN II: Pupils are equal, round, and reactive to light. Blinks to visual threat and counts fingers in all fields  CN III IV VI: Extraocular movements are full without nystagmus.   CN V: Normal facial sensation   CN VII: Facial movements are symmetric. No labial dysarthria  CN VIII:   Auditory acuity is normal.  CN IX & X:   No palatal or pharnygeal dysarthria.  CN XI: Turns head without abnormality.   CN XII:   The tongue is midline.  No lingual dysarthria.   Motor: Normal muscle strength, bulk and tone in upper and lower extremities.  No fasciculations, rigidity, spasticity, or abnormal movements.  Reflexes: Symmetric in the upper and lower extremities. Plantar responses are flexor.  Sensation: Normal to light touch  Station and Gait: Deferred for bed rest    Coordination: Finger-to-nose test shows no dysmetria.  Rapid alternating movements are normal.  Heel-to-shin normal.        Diagnoses / Discussion:  53 y.o. who presents with Sx of new onset headaches with gustatory hallucinations, decreasing memory and word finding difficulties.  Patient has no previous history of headaches does not have an otherwise overly significant medical " history.  He has been having increasing frequency of gustatory hallucinations which include smelling a burning sensation like a burning ashtray.  This is most suggestive of temporal lobe seizures.  Overall this most likely represents temporal lobe seizures the gustatory hallucinations are pretty suggestive of complex partial seizures.  This was also fit in the setting of difficulty with memory and headaches as well as increasing difficulty with word finding.  This is can all occur secondary to new onset seizure disorder versus seizure disorder in the setting of early neurodegenerative disease such as dementia.  MRI brain with and without is unremarkable.    Plan:  MRI brain with and without unremarkable  EEG  B12, folate, TSH, ammonia  Follow-up in neurology clinic in 3 to 4 weeks  After completion of EEG start Keppra 1 g followed by 500 mg twice daily thereafter  Head CT unremarkable  Orthostatic vital signs  Will follow up EEG and monitor patient's response to Keppra, patient should not be having any further gustatory hallucinations.  If he continues with this will consider continuous EEG monitoring.     I have discussed the above with the patient and family.  Time spent with patient: 60min    MDM  Reviewed: previous chart, nursing note and vitals  Reviewed previous: labs, CT scan and MRI  Interpretation: labs, CT scan and MRI  Total time providing critical care: 30-74 minutes. This excludes time spent performing separately reportable procedures and services.  Consults: neurology       Xochitl Becker MD  Neurology

## 2023-08-16 NOTE — PLAN OF CARE
Goal Outcome Evaluation:   Pt is alert and oriented and up @ scotty. No dizziness noted at this time. Pt still experiencing ashtray aura. Pt to possibly go home tomorrow.

## 2023-08-17 ENCOUNTER — APPOINTMENT (OUTPATIENT)
Dept: NEUROLOGY | Facility: HOSPITAL | Age: 53
End: 2023-08-17
Payer: COMMERCIAL

## 2023-08-17 ENCOUNTER — READMISSION MANAGEMENT (OUTPATIENT)
Dept: CALL CENTER | Facility: HOSPITAL | Age: 53
End: 2023-08-17
Payer: COMMERCIAL

## 2023-08-17 VITALS
TEMPERATURE: 97.5 F | HEART RATE: 75 BPM | DIASTOLIC BLOOD PRESSURE: 73 MMHG | HEIGHT: 73 IN | WEIGHT: 315 LBS | BODY MASS INDEX: 41.75 KG/M2 | SYSTOLIC BLOOD PRESSURE: 116 MMHG | OXYGEN SATURATION: 95 % | RESPIRATION RATE: 16 BRPM

## 2023-08-17 DIAGNOSIS — E55.9 VITAMIN D DEFICIENCY: Chronic | ICD-10-CM

## 2023-08-17 PROCEDURE — 25010000002 CYANOCOBALAMIN PER 1000 MCG: Performed by: PHYSICIAN ASSISTANT

## 2023-08-17 PROCEDURE — G0378 HOSPITAL OBSERVATION PER HR: HCPCS

## 2023-08-17 PROCEDURE — 96372 THER/PROPH/DIAG INJ SC/IM: CPT

## 2023-08-17 RX ORDER — CHOLECALCIFEROL (VITAMIN D3) 1250 MCG
CAPSULE ORAL
Qty: 12 CAPSULE | Refills: 0 | Status: SHIPPED | OUTPATIENT
Start: 2023-08-17

## 2023-08-17 RX ORDER — CYANOCOBALAMIN 1000 UG/ML
1000 INJECTION, SOLUTION INTRAMUSCULAR; SUBCUTANEOUS ONCE
Status: COMPLETED | OUTPATIENT
Start: 2023-08-17 | End: 2023-08-17

## 2023-08-17 RX ORDER — LEVETIRACETAM 1000 MG/1
1000 TABLET ORAL EVERY 12 HOURS
Qty: 60 TABLET | Refills: 0 | Status: SHIPPED | OUTPATIENT
Start: 2023-08-17 | End: 2023-09-16

## 2023-08-17 RX ADMIN — ALLOPURINOL 300 MG: 100 TABLET ORAL at 08:05

## 2023-08-17 RX ADMIN — ASPIRIN 325 MG: 325 TABLET ORAL at 08:05

## 2023-08-17 RX ADMIN — Medication 10 ML: at 08:08

## 2023-08-17 RX ADMIN — LEVETIRACETAM 500 MG: 500 TABLET, FILM COATED ORAL at 08:10

## 2023-08-17 RX ADMIN — METOPROLOL SUCCINATE 25 MG: 25 TABLET, EXTENDED RELEASE ORAL at 08:05

## 2023-08-17 RX ADMIN — CYANOCOBALAMIN 1000 MCG: 1000 INJECTION, SOLUTION INTRAMUSCULAR; SUBCUTANEOUS at 13:39

## 2023-08-17 NOTE — PLAN OF CARE
Goal Outcome Evaluation:  Plan of Care Reviewed With: patient           Outcome Evaluation: no c/o pain, oriented throughout the shift w/out evidence of confusion, and neuro consult.

## 2023-08-17 NOTE — PROGRESS NOTES
"DOS: 2023  NAME: Victoriano Rowe   : 1970  PCP: Alex Quintanilla MD  Chief Complaint   Patient presents with    Headache    Dizziness       Chief complaint: Headache, dizziness, phantom smells  Subjective: Patient describing 2 months of headaches.  He has not headache person.  No migrainous features no positional features.  Sharp right-sided not particularly bothersome has taken OTCs half a dozen times over the last 2 months.  Also intermittent episodes of dizziness described as vertigo spinning lasting seconds creasing in frequency.  Over the last couple weeks issues with memory fog and phantom smells.  Mostly throughout the day he smells burning or an ashtray.  The only time that this changes is if a stronger smell is presented like this morning's breakfast smell took over.  No new exposures, no sprays, vision changes.  What gave him alarm was that he had difficulty using his remote could not understand how to work the buttons.  He has history of sleep apnea compliant with his machine-even got a new mask recently    Objective:  Vital signs: /88 (BP Location: Right arm, Patient Position: Lying)   Pulse 68   Temp 97.4 øF (36.3 øC) (Oral)   Resp 16   Ht 185 cm (72.84\")   Wt (!) 154 kg (339 lb 8.1 oz)   SpO2 97%   BMI 45.00 kg/mý      Gen: NAD, vitals reviewed  MS: oriented x3, recent/remote memory intact, normal attention/concentration, language intact, no neglect.  CN: visual acuity grossly normal, visual fields intact on confrontation PERRL, EOMI, no facial droop, no dysarthria  Motor: 5/5 throughout upper and lower extremities, normal tone  Sensory: intact to light touch all 4 ext.    ROS:  No weakness, numbness  No fevers, chills      Laboratory results:  Lab Results   Component Value Date    GLUCOSE 117 (H) 2023    CALCIUM 9.3 2023     2023    K 3.9 2023    CO2 27.3 2023     2023    BUN 12 2023    CREATININE 0.92 2023    " EGFRIFAFRI 103 01/31/2020    EGFRIFNONA 83 04/07/2021    BCR 13.0 08/16/2023    ANIONGAP 11.7 08/16/2023     Lab Results   Component Value Date    WBC 8.06 08/16/2023    HGB 14.9 08/16/2023    HCT 44.9 08/16/2023    MCV 89.4 08/16/2023     08/16/2023     Lab Results   Component Value Date    LDL 49 08/15/2023    LDL 53 06/17/2016    LDL 33 01/18/2016         Lab 08/15/23  1919   HEMOGLOBIN A1C 6.40*        Review of labs: TSH 4.1 and normal, ammonia normal, B12 273, folate 13    Review and interpretation of imaging: Reviewed neuroimaging including MRI brain with and without and there is evidence of chronic ethmoid sinusitis and also 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 but no abnormal lesions or enhancement no acute finding    Workup to date:  Spot EEG yesterday was normal  Diagnoses:  New onset headache  Memory problem  Phantosmia     Impression: 53-year-old right-handed male with past medical history of SONJA compliant with machine, hypertension, hyperlipidemia prediabetes, elevated BMI, subclinical hypothyroidism, reported history with sinus trouble is presented to the hospital with several week to month history of constellation of symptoms including new onset headaches and word finding difficulty and increased frequency of gustatory hallucinations described as burning smell/ashtray.his MRI is on explanatory.  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  Spot EEG yesterday normal.  He was initiated on Keppra trial    Plan:  1 LEV 1000/1000  2 B12 supplementation  3 F/u with pcp r/t possible thyroid dz  5 F/u in neuro clinic        Thank you for this consultation.  Discussed above plan with neuro attending, Dr. Becker who agrees with above plan.  Neurology team is available for concerns or questions.

## 2023-08-17 NOTE — OUTREACH NOTE
Prep Survey      Flowsheet Row Responses   [facility] facility patient discharged from? [place]   Is LACE score < 7 ? Yes   Eligibility [facility]   Date of Admission 08/15/23   Date of Discharge 08/17/23   Discharge Disposition Home or Self Care   Discharge diagnosis Headache   Does the patient have one of the following disease processes/diagnoses(primary or secondary)? Other   Does the patient have Home health ordered? No   Is there a DME ordered? No   Prep survey completed? Yes            LILO RIGGINS - Registered Nurse

## 2023-08-17 NOTE — DISCHARGE SUMMARY
ED OBSERVATION PROGRESS/DISCHARGE SUMMARY    Date of Admission: 8/15/2023   LOS: 0 days   PCP: lAex Quintanilla MD    Subjective patient feeling well this afternoon, voices no complaints.  Reports he is still experiencing gustatory hallucinations.    Hospital Outcome:    53-year-old male admitted to the observation unit for further evaluation of intermittent forgetfulness, nasal hallucinations, dizziness, and headaches.  Patient had MRI brain with and without contrast that is negative acute.  Patient was seen by neurology, discussed with Dr. Becker.  She suspects patient is having temporal lobe/ partial complex seizures.  EEG obtained which is normal.  Patient still having gustatory hallucinations so neurology recommends increasing Keppra to 1000 mg twice daily.  Patient will follow-up with neurology.  Usual return to ER precautions discussed with patient who expresses understanding and is in agreement with plan.      ROS:  General: no fevers, chills  Respiratory: no cough, dyspnea  Cardiovascular: no chest pain, palpitations  Abdomen: No abdominal pain, nausea, vomiting, or diarrhea  Neurologic: No focal weakness    Objective   Physical Exam:  I have reviewed the vital signs.  Temp:  [97 øF (36.1 øC)-98.2 øF (36.8 øC)] 97.6 øF (36.4 øC)  Heart Rate:  [70-95] 70  Resp:  [16-18] 18  BP: (122-142)/(76-90) 123/76  General Appearance:    Alert, cooperative, no distress  Head:    Normocephalic, atraumatic  Eyes:    Sclerae anicteric  Neck:   Supple, no mass  Lungs: Clear to auscultation bilaterally, respirations unlabored  Heart: Regular rate and rhythm, S1 and S2 normal, no murmur, rub or gallop  Abdomen:  Soft, non-tender, bowel sounds active, nondistended  Extremities: No clubbing, cyanosis, or edema to lower extremities  Pulses:  2+ and symmetric in distal lower extremities  Skin: No rashes   Neurologic: Oriented x3, Normal strength to extremities    Results Review:    I have reviewed the labs, radiology results and  diagnostic studies.    Results from last 7 days   Lab Units 08/16/23  0517   WBC 10*3/mm3 8.06   HEMOGLOBIN g/dL 14.9   HEMATOCRIT % 44.9   PLATELETS 10*3/mm3 179     Results from last 7 days   Lab Units 08/16/23  0517 08/15/23  1514   SODIUM mmol/L 139 140   POTASSIUM mmol/L 3.9 4.2   CHLORIDE mmol/L 100 100   CO2 mmol/L 27.3 28.7   BUN mg/dL 12 12   CREATININE mg/dL 0.92 0.83   CALCIUM mg/dL 9.3 10.0   BILIRUBIN mg/dL 0.7 0.6   ALK PHOS U/L 56 61   ALT (SGPT) U/L 50* 57*   AST (SGOT) U/L 33 33   GLUCOSE mg/dL 117* 114*     Imaging Results (Last 24 Hours)       ** No results found for the last 24 hours. **            I have reviewed the medications.  ---------------------------------------------------------------------------------------------  Assessment & Plan   Assessment/Problem List    Headache    Dizziness      Plan:  Headache/dizziness  Gustatory hallucinations  -Neurochecks every 4 hours   -Vital signs every 4 hours   -Cardiac monitoring   -MRI negative acute  -CT Head -no evidence of intracranial hemorrhage or lesion or mass effect  -Echo reviewed, EF 67%, otherwise unremarkable  -Neurology consult, Dr. Becker  -EEG pending  -Loaded with Keppra 1000 mg IV followed by Keppra 1000 mg p.o. twice daily  -Follow up outpatient with neurology      Hypertension   -Monitor blood pressure  -Continue metoprolol     Hyperlipidemia  -Continue atorvastatin    Disposition: Home    Follow-up after Discharge: Neurology    This note will serve as a discharge summary    BRAYAN Craig 08/17/23 13:02 EDT    I have worn appropriate PPE during this patient encounter, sanitized my hands both with entering and exiting patient's room.      42 minutes has been spent by [facility] providers in the care of this patient while under observation status

## 2023-08-17 NOTE — PLAN OF CARE
Goal Outcome Evaluation:   Pt is being discharged via spouse vehicle. Pt is still having phantosmia. Pt has not had anymore dizziness. Pt is going home on keppra and received med prior to leaving. Pt also received B12 shot. Pt to follow up with neurology and PCP. Neurologist office is to call pt to schedule appointment.

## 2023-08-18 ENCOUNTER — TRANSITIONAL CARE MANAGEMENT TELEPHONE ENCOUNTER (OUTPATIENT)
Dept: CALL CENTER | Facility: HOSPITAL | Age: 53
End: 2023-08-18
Payer: COMMERCIAL

## 2023-08-18 NOTE — OUTREACH NOTE
Call Center TCM Note      Flowsheet Row Responses   [facility] patient discharged from? [place]   Does the patient have one of the following disease processes/diagnoses(primary or secondary)? Other   TCM attempt successful? Yes   Call start time 1009   Call end time 1012   Discharge diagnosis Headache   Meds reviewed with patient/caregiver? Yes   Is the patient having any side effects they believe may be caused by any medication additions or changes? No   Does the patient have all medications ordered at discharge? Yes   Is the patient taking all medications as directed (includes completed medication regime)? Yes   Does the patient have an appointment with their PCP within 7-14 days of discharge? No   Nursing Interventions PCP office requested to make appointment - message sent   Has home health visited the patient within 72 hours of discharge? N/A   Psychosocial issues? No   Did the patient receive a copy of their discharge instructions? Yes   Nursing interventions Reviewed instructions with patient   What is the patient's perception of their health status since discharge? Improving   Is the patient/caregiver able to teach back signs and symptoms related to disease process for when to call PCP? Yes   Is the patient/caregiver able to teach back signs and symptoms related to disease process for when to call 911? Yes   Is the patient/caregiver able to teach back the hierarchy of who to call/visit for symptoms/problems? PCP, Specialist, Home health nurse, Urgent Care, ED, 911 Yes   If the patient is a current smoker, are they able to teach back resources for cessation? Not a smoker   TCM call completed? Yes   Call end time 1012   Would this patient benefit from a Referral to Amb Social Work? No   Is the patient interested in additional calls from an ambulatory ? No            Anya Stinson LPN    8/18/2023, 10:14 EDT

## 2023-08-18 NOTE — OUTREACH NOTE
Call Center TCM Note      Flowsheet Row Responses   [facility] patient discharged from? [place]   Does the patient have one of the following disease processes/diagnoses(primary or secondary)? Other   TCM attempt successful? Yes   Call start time 1009   Call end time 1012   Discharge diagnosis Headache   Meds reviewed with patient/caregiver? Yes   Is the patient having any side effects they believe may be caused by any medication additions or changes? No   Does the patient have all medications ordered at discharge? Yes   Is the patient taking all medications as directed (includes completed medication regime)? Yes   Comments Patient states he is willing to see an APRN due to Dr. Quintanilla being booked out, but provider preference guide states that pcp prefers no other providers. Call routed to office.   Does the patient have an appointment with their PCP within 7-14 days of discharge? No   Nursing Interventions PCP office requested to make appointment - message sent   Has home health visited the patient within 72 hours of discharge? N/A   Psychosocial issues? No   Did the patient receive a copy of their discharge instructions? Yes   Nursing interventions Reviewed instructions with patient   What is the patient's perception of their health status since discharge? Improving   Is the patient/caregiver able to teach back signs and symptoms related to disease process for when to call PCP? Yes   Is the patient/caregiver able to teach back signs and symptoms related to disease process for when to call 911? Yes   Is the patient/caregiver able to teach back the hierarchy of who to call/visit for symptoms/problems? PCP, Specialist, Home health nurse, Urgent Care, ED, 911 Yes   If the patient is a current smoker, are they able to teach back resources for cessation? Not a smoker   TCM call completed? Yes   Call end time 1012   Would this patient benefit from a Referral to Amb Social Work? No   Is the patient interested in  additional calls from an ambulatory ? No            Anya Stinson LPN    8/18/2023, 10:22 EDT

## 2023-08-30 ENCOUNTER — OFFICE VISIT (OUTPATIENT)
Dept: INTERNAL MEDICINE | Facility: CLINIC | Age: 53
End: 2023-08-30
Payer: COMMERCIAL

## 2023-08-30 VITALS
TEMPERATURE: 96.7 F | WEIGHT: 315 LBS | DIASTOLIC BLOOD PRESSURE: 76 MMHG | OXYGEN SATURATION: 94 % | SYSTOLIC BLOOD PRESSURE: 140 MMHG | HEIGHT: 73 IN | BODY MASS INDEX: 41.75 KG/M2 | HEART RATE: 88 BPM

## 2023-08-30 DIAGNOSIS — Z91.09 MULTIPLE ENVIRONMENTAL ALLERGIES: Chronic | ICD-10-CM

## 2023-08-30 DIAGNOSIS — R44.2 GUSTATORY HALLUCINATIONS: ICD-10-CM

## 2023-08-30 DIAGNOSIS — E66.01 MORBID OBESITY: Chronic | ICD-10-CM

## 2023-08-30 DIAGNOSIS — G47.33 OBSTRUCTIVE SLEEP APNEA: Chronic | ICD-10-CM

## 2023-08-30 DIAGNOSIS — Z87.442 HISTORY OF NEPHROLITHIASIS: Chronic | ICD-10-CM

## 2023-08-30 DIAGNOSIS — G44.89 OTHER HEADACHE SYNDROME: ICD-10-CM

## 2023-08-30 DIAGNOSIS — Z09 HOSPITAL DISCHARGE FOLLOW-UP: Primary | ICD-10-CM

## 2023-08-30 DIAGNOSIS — I10 BENIGN ESSENTIAL HYPERTENSION: Chronic | ICD-10-CM

## 2023-08-30 DIAGNOSIS — R73.01 IMPAIRED FASTING GLUCOSE: Chronic | ICD-10-CM

## 2023-08-30 DIAGNOSIS — E03.8 SUBCLINICAL HYPOTHYROIDISM: Chronic | ICD-10-CM

## 2023-08-30 DIAGNOSIS — R74.8 ELEVATED LIVER ENZYMES: Chronic | ICD-10-CM

## 2023-08-30 DIAGNOSIS — E79.0 ASYMPTOMATIC HYPERURICEMIA: Chronic | ICD-10-CM

## 2023-08-30 DIAGNOSIS — E05.00 GRAVES' OPHTHALMOPATHY: ICD-10-CM

## 2023-08-30 DIAGNOSIS — E55.9 VITAMIN D DEFICIENCY: Chronic | ICD-10-CM

## 2023-08-30 DIAGNOSIS — R42 DIZZINESS: ICD-10-CM

## 2023-08-30 DIAGNOSIS — Z00.00 ROUTINE PHYSICAL EXAMINATION: ICD-10-CM

## 2023-08-30 DIAGNOSIS — Z51.81 THERAPEUTIC DRUG MONITORING: ICD-10-CM

## 2023-08-30 DIAGNOSIS — E78.2 MIXED HYPERLIPIDEMIA: Chronic | ICD-10-CM

## 2023-08-30 DIAGNOSIS — G40.109 TEMPORAL LOBE EPILEPSY: ICD-10-CM

## 2023-08-30 RX ORDER — AZITHROMYCIN 250 MG/1
TABLET, FILM COATED ORAL
Qty: 6 TABLET | Refills: 0 | Status: SHIPPED | OUTPATIENT
Start: 2023-08-30

## 2023-08-30 NOTE — PROGRESS NOTES
08/30/2023    Patient Information  Victoriano Rowe                                                                                          [id number] [facility] [place] [address]      1970  [unfilled]  [phone] (work)    Chief Complaint:     Hospital discharge follow-up.  No new acute complaints.    History of Present Illness:    Patient with a history of multiple chronic medical problems including hyperuricemia, hypertension, chronic lower back pain, elevated liver enzymes, hyperlipidemia, impaired fasting glucose, morbid obesity, environmental allergies, sleep apnea, subclinical hypothyroidism, vitamin D deficiency, recent admission briefly to the hospital for gustatory hallucinations which are felt to be related to temporal lobe epilepsy by the neurologist.  He is here today for hospital discharge follow-up.  The history regarding this will be described below.  Medications reviewed at length patient is on Keppra 1 g every 12 hours.  His other medications appear unchanged.      The history regarding recent admission is documented in the problem list under the appropriate diagnoses by Dr. Quintanilla and including documentation from the hospitalist and this was subsequently transferred to this note as followed by Dr. Quintanilla as follows:    August 30, 2023--patient seen in follow-up/transition of care by Dr. Quintanilla.  The hospital documentation reviewed including history and physical, hospital course, neurology consultation, discharge summary and discharge medications.    Date of Admission: 8/15/2023 date of discharge: August 17, 2023   LOS: 0 days   PCP: Alex Quintanilla MD     Subjective patient feeling well this afternoon, voices no complaints.  Reports he is still experiencing gustatory hallucinations.     Hospital Outcome:    53-year-old male admitted to the observation unit for further evaluation of intermittent forgetfulness, nasal hallucinations, dizziness, and headaches.  Patient had MRI  brain with and without contrast that is negative acute.  Patient was seen by neurology, discussed with Dr. Becker.  She suspects patient is having temporal lobe/ partial complex seizures.  EEG obtained which is normal.  Patient still having gustatory hallucinations so neurology recommends increasing Keppra to 1000 mg twice daily.  Patient will follow-up with neurology.  Usual return to ER precautions discussed with patient who expresses understanding and is in agreement with plan.    Review of Systems   Constitutional: Negative.   HENT: Negative.          Unusual smells and taste   Eyes: Negative.    Cardiovascular: Negative.    Respiratory: Negative.     Endocrine: Negative.    Hematologic/Lymphatic: Negative.    Skin: Negative.    Musculoskeletal: Negative.    Gastrointestinal: Negative.    Genitourinary: Negative.    Neurological: Negative.    Psychiatric/Behavioral: Negative.     Allergic/Immunologic: Negative.      Active Problems:    Patient Active Problem List   Diagnosis    Allergic rhinitis    Asymptomatic hyperuricemia    Benign essential hypertension    Chronic bilateral low back pain without sciatica    Hyperlipidemia    Borderline Hypogonadism male    Impaired fasting glucose    Male erectile disorder    Morbid obesity    Obstructive sleep apnea, 2008--tolerates CPAP well.    Vitamin D deficiency    History of nephrolithiasis    Therapeutic drug monitoring    Routine physical examination    Multiple environmental allergies    Elevated liver enzymes    Subclinical hypothyroidism    Diverticulosis of colon    Headache    Dizziness    Hospital discharge follow-up    Gustatory hallucinations    Temporal lobe epilepsy         Past Medical History:   Diagnosis Date    Allergic rhinitis 1/22/2016    Patient has never had allergy testing.  Symptoms are seasonal.    Asymptomatic hyperuricemia 8/25/2011 08/08/2015--patient seen in follow-up tolerated the lower well.  Uric acid upper limit of normal at 6.7.   Uloric increased to 80 mg per day.  06/08/2015--uric acid not at goal.  Started on Uloric 40 mg per day.  08/25/2011--patient was noted to have elevated uric acid but has never had an episode of gout.  Allopurinol was initiated in hopes that this would reduce kidney stones.    Benign essential hypertension 5/28/2008 02/16/2016--patient has lost about 60 pounds and his blood pressure is running low on half a dose of Benicar HCT 40/25.  Blood pressure medications discontinued except for metoprolol.  05/28/2008--treatment for hypertension begun.    Borderline Hypogonadism male 8/25/2011 03/21/2017--patient seen in follow-up and he feels well and does not feel any different off of TRT.  His total testosterone is borderline low at 347.  His free and weakly bound testosterone is in the normal range at 77.0.  Patient needs to stay off of testosterone replacement therapy and we will continue to monitor.  06/30/2016--patient seen for a routine physical examination.  He reports that TRT    Chronic bilateral low back pain without sciatica 6/19/2010    10/17/2014--patient presents with a five-day history of low back pain without radiation into the lower extremities.  No known trauma or injury.  He was making up the bed when this occurred.  No bowel or bladder symptoms.  Patient has a known history of herniated nucleus pulposus and bulging disks.  He has been taking some generic Aleve and this has not really helped much.  He had some leftover hyd    Elevated liver enzymes 7/27/2017 02/06/2019--routine physical.  AST normal at 28, ALT mildly elevated at 60.  Suspect early PAGE despite normal liver ultrasound.  07/27/2017--routine physical.  AST elevated at 52, ALT elevated at 97.  Hepatitis C antibody screening and hepatitis B surface antigen ordered.  Repeat CMP ordered.  Ultrasound liver.  Hepatitis C and hepatitis B returned negative.  Repeat CMP showed improved liver enzy    History of nephrolithiasis 7/19/2011     08/25/2011-- allopurinol initiated for hyperuricemia.  07/19/2011--patient presented with right flank pain with radiation into the right back. CT scan of the abdomen revealed a 4 mm stone in the dependent aspect of the urinary bladder.  Likely recent passage from the right ureter where there is mild right hydronephrosis.  1.6 cm nonobstructing stone at the lower pole of the left kidney and a punctate nonobstructing stone at the upper pole of the left kidney.  Patient passed the stone spontaneously.  Calcium oxalate.    Hyperlipidemia 9/28/2008 09/28/2008--treatment for hyperlipidemia begun    Impaired fasting glucose 4/1/2011 04/01/2011--initial diagnosis of impaired fasting glucose.  Initial hemoglobin A1c normal at 5.5.  A few months later 5.7.  Has never been significantly elevated.    Male erectile disorder 1/22/2016    Morbid obesity 1/22/2016    Multiple environmental allergies 1/22/2016    Patient has never had allergy testing.  Symptoms are seasonal.    Obstructive sleep apnea, 2008--tolerates CPAP well. 1/22/2016    Diagnosed in spring of 2008.  Tolerates CPAP well.    Subclinical hypothyroidism 2/6/2019 02/06/2019--routine physical.  TSH is slightly elevated at 4.53.  Free T3 and free T4 are normal.  Close observation.    Vitamin D deficiency 1/22/2016         Past Surgical History:   Procedure Laterality Date    COLONOSCOPY N/A 03/18/2022 March 18, 2022--colonoscopy revealed normal terminal ileum.  Few medium mouth diverticuli were found in the sigmoid colon.  Nonbleeding internal hemorrhoids grade 2.    KIDNEY SURGERY Left     Age [age].  Patient had some sort of swelling of his left kidney, possibly a cyst.  Surgical resection.  No further problems.         No Known Allergies        Current Outpatient Medications:     allopurinol (ZYLOPRIM) 300 MG tablet, Take 1 tablet by mouth daily for gout/elevated uric acid., Disp: 30 tablet, Rfl: 5    Aspirin Low Dose 81 MG EC tablet, Take 1 tablet by  "mouth daily., Disp: 90 tablet, Rfl: 0    Cholecalciferol (Vitamin D3) 1.25 MG (44870 UT) capsule, Take 1 capsule by mouth three times a week on Monday, Wednesday, and Friday., Disp: 12 capsule, Rfl: 0    ezetimibe-simvastatin (VYTORIN) 10-40 MG per tablet, Take 1 tablet by mouth daily for high cholesterol. **Please schedule and keep appointment for further refills.**, Disp: 30 tablet, Rfl: 5    levETIRAcetam (KEPPRA) 1000 MG tablet, Take 1 tablet by mouth Every 12 (Twelve) Hours for 30 days., Disp: 60 tablet, Rfl: 0    metoprolol succinate XL (TOPROL-XL) 25 MG 24 hr tablet, Take 1 tablet by mouth every morning for high blood pressure., Disp: 30 tablet, Rfl: 1    Multiple Vitamins tablet, Take 1 tablet by mouth Daily., Disp: , Rfl:     sildenafil (VIAGRA) 100 MG tablet, Take 1 tablet by mouth Daily As Needed for erectile dysfunction., Disp: 5 tablet, Rfl: 5      Family History   Problem Relation Age of Onset    Alcohol abuse Father     Heart failure Father     Emphysema Father     Diabetes Father     Diabetes Sister          Social History     Socioeconomic History    Marital status:     Number of children: 0    Highest education level: Some college, no degree   Tobacco Use    Smoking status: Never    Smokeless tobacco: Never   Vaping Use    Vaping Use: Never used   Substance and Sexual Activity    Alcohol use: Yes     Comment: Social alcohol consumption    Drug use: No    Sexual activity: Yes     Partners: Female         Vitals:    08/30/23 1128   BP: 140/76   Pulse: 88   Temp: 96.7 øF (35.9 øC)   SpO2: 94%   Weight: (!) 157 kg (347 lb)   Height: 185 cm (72.84\")        Body mass index is 45.99 kg/mý.      Physical Exam:    General: Alert and oriented x 3.  No acute distress.  Obese.Normal affect.  HEENT: Pupils equal, round, reactive to light; extraocular movements intact; sclerae nonicteric; pharynx, ear canals and TMs normal.  Neck: Without JVD, thyromegaly, bruit, or adenopathy.  Lungs: Clear to " auscultation in all fields.  Heart: Regular rate and rhythm without murmur, rub, gallop, or click.  Abdomen: Soft, nontender, without hepatosplenomegaly or hernia.  Bowel sounds normal.  : Deferred.  Rectal: Deferred.  Extremities: Without clubbing, cyanosis, edema, or pulse deficit.  Neurologic: Intact without focal deficit.  Normal station and gait observed during ingress and egress from the examination room.  Skin: Without significant lesion.  Musculoskeletal: Unremarkable.    Lab/other results:      Assessment/Plan:     Diagnosis Plan   1. Hospital discharge follow-up        2. Temporal lobe epilepsy  Ambulatory Referral to Neurology      3. Gustatory hallucinations  Ambulatory Referral to Neurology      4. Dizziness  Ambulatory Referral to Neurology      5. Other headache syndrome  Ambulatory Referral to Neurology      6. Hyperlipidemia  CK    Comprehensive Metabolic Panel      7. Impaired fasting glucose  Comprehensive Metabolic Panel    Urinalysis With Microscopic If Indicated (No Culture) - Urine, Clean Catch      8. Morbid obesity        9. Multiple environmental allergies        10. Obstructive sleep apnea, 2008--tolerates CPAP well.        11. Subclinical hypothyroidism  TSH    T4, Free    T3, Free      12. Vitamin D deficiency  Vitamin D,25-Hydroxy      13. History of nephrolithiasis        14. Elevated liver enzymes        15. Benign essential hypertension        16. Asymptomatic hyperuricemia        17. Routine physical examination  CBC (No Diff)    CK    Comprehensive Metabolic Panel    Hemoglobin A1c    NMR LipoProfile    TSH    T4, Free    T3, Free    Uric Acid    Urinalysis With Microscopic If Indicated (No Culture) - Urine, Clean Catch    Vitamin D,25-Hydroxy    PSA DIAGNOSTIC      18. Therapeutic drug monitoring  Uric Acid      19. Graves' ophthalmopathy  Thyroid Antibodies          Patient seen in hospital discharge follow-up/transition of care and is still having gustatory hallucinations  despite being on Keppra.  Also noted that there was concern for Graves' ophthalmopathy noted on MRI of the brain.  I reviewed patient's thyroid function tests which were normal.  Would like to order thyroid antibodies and may need further work-up in the future regarding this.  I really do not see any definite signs or symptoms of Graves' disease or Graves' ophthalmopathy.    Plan is as follows: Check lab work today.  Follow-up on the phone for the results.  Neurology referral ASAP.  No change in medications at the present time.  Annual physical examination with lab prior September 19, 2023 or shortly thereafter.      Procedures

## 2023-08-31 LAB
THYROGLOB AB SERPL-ACNC: <1 IU/ML (ref 0–0.9)
THYROPEROXIDASE AB SERPL-ACNC: <9 IU/ML (ref 0–34)

## 2023-09-05 DIAGNOSIS — E03.8 SUBCLINICAL HYPOTHYROIDISM: Chronic | ICD-10-CM

## 2023-09-05 DIAGNOSIS — Z00.00 ROUTINE PHYSICAL EXAMINATION: ICD-10-CM

## 2023-09-05 DIAGNOSIS — Z51.81 THERAPEUTIC DRUG MONITORING: ICD-10-CM

## 2023-09-05 DIAGNOSIS — E55.9 VITAMIN D DEFICIENCY: Chronic | ICD-10-CM

## 2023-09-05 DIAGNOSIS — E78.2 MIXED HYPERLIPIDEMIA: Chronic | ICD-10-CM

## 2023-09-05 DIAGNOSIS — R73.01 IMPAIRED FASTING GLUCOSE: Chronic | ICD-10-CM

## 2023-09-07 LAB
25(OH)D3+25(OH)D2 SERPL-MCNC: 99.4 NG/ML (ref 30–100)
ALBUMIN SERPL-MCNC: 4.2 G/DL (ref 3.5–5.2)
ALBUMIN/GLOB SERPL: 1.4 G/DL
ALP SERPL-CCNC: 60 U/L (ref 39–117)
ALT SERPL-CCNC: 52 U/L (ref 1–41)
APPEARANCE UR: CLEAR
AST SERPL-CCNC: 39 U/L (ref 1–40)
BACTERIA #/AREA URNS HPF: NORMAL /HPF
BILIRUB SERPL-MCNC: 0.3 MG/DL (ref 0–1.2)
BILIRUB UR QL STRIP: NEGATIVE
BUN SERPL-MCNC: 11 MG/DL (ref 6–20)
BUN/CREAT SERPL: 12.8 (ref 7–25)
CALCIUM SERPL-MCNC: 9.3 MG/DL (ref 8.6–10.5)
CASTS URNS MICRO: NORMAL
CHLORIDE SERPL-SCNC: 103 MMOL/L (ref 98–107)
CHOLEST SERPL-MCNC: 132 MG/DL (ref 100–199)
CK SERPL-CCNC: 95 U/L (ref 20–200)
CO2 SERPL-SCNC: 24.7 MMOL/L (ref 22–29)
COLOR UR: YELLOW
CREAT SERPL-MCNC: 0.86 MG/DL (ref 0.76–1.27)
CRYSTALS URNS MICRO: NORMAL
EGFRCR SERPLBLD CKD-EPI 2021: 104.2 ML/MIN/1.73
EPI CELLS #/AREA URNS HPF: NORMAL /HPF
ERYTHROCYTE [DISTWIDTH] IN BLOOD BY AUTOMATED COUNT: 13.1 % (ref 12.3–15.4)
GLOBULIN SER CALC-MCNC: 2.9 GM/DL
GLUCOSE SERPL-MCNC: 139 MG/DL (ref 65–99)
GLUCOSE UR QL STRIP: NEGATIVE
HBA1C MFR BLD: 6.6 % (ref 4.8–5.6)
HCT VFR BLD AUTO: 45.4 % (ref 37.5–51)
HDL SERPL-SCNC: 35.4 UMOL/L
HDLC SERPL-MCNC: 39 MG/DL
HGB BLD-MCNC: 14.9 G/DL (ref 13–17.7)
HGB UR QL STRIP: ABNORMAL
KETONES UR QL STRIP: NEGATIVE
LDL SERPL QN: 19.9 NM
LDL SERPL-SCNC: 558 NMOL/L
LDL SMALL SERPL-SCNC: 386 NMOL/L
LDLC SERPL CALC-MCNC: 53 MG/DL (ref 0–99)
LEUKOCYTE ESTERASE UR QL STRIP: NEGATIVE
MCH RBC QN AUTO: 29.3 PG (ref 26.6–33)
MCHC RBC AUTO-ENTMCNC: 32.8 G/DL (ref 31.5–35.7)
MCV RBC AUTO: 89.4 FL (ref 79–97)
NITRITE UR QL STRIP: NEGATIVE
PH UR STRIP: 5.5 [PH] (ref 5–8)
PLATELET # BLD AUTO: 188 10*3/MM3 (ref 140–450)
POTASSIUM SERPL-SCNC: 4.5 MMOL/L (ref 3.5–5.2)
PROT SERPL-MCNC: 7.1 G/DL (ref 6–8.5)
PROT UR QL STRIP: ABNORMAL
PSA SERPL-MCNC: 0.41 NG/ML (ref 0–4)
RBC # BLD AUTO: 5.08 10*6/MM3 (ref 4.14–5.8)
RBC #/AREA URNS HPF: NORMAL /HPF
SODIUM SERPL-SCNC: 143 MMOL/L (ref 136–145)
SP GR UR STRIP: 1.02 (ref 1–1.03)
T3FREE SERPL-MCNC: 3.6 PG/ML (ref 2–4.4)
T4 FREE SERPL-MCNC: 1.08 NG/DL (ref 0.93–1.7)
TRIGL SERPL-MCNC: 252 MG/DL (ref 0–149)
TSH SERPL DL<=0.005 MIU/L-ACNC: 3.41 UIU/ML (ref 0.27–4.2)
URATE SERPL-MCNC: 4.8 MG/DL (ref 3.4–7)
UROBILINOGEN UR STRIP-MCNC: ABNORMAL MG/DL
WBC # BLD AUTO: 7.2 10*3/MM3 (ref 3.4–10.8)
WBC #/AREA URNS HPF: NORMAL /HPF

## 2023-09-15 ENCOUNTER — OFFICE VISIT (OUTPATIENT)
Dept: INTERNAL MEDICINE | Facility: CLINIC | Age: 53
End: 2023-09-15
Payer: COMMERCIAL

## 2023-09-15 DIAGNOSIS — M54.50 CHRONIC BILATERAL LOW BACK PAIN WITHOUT SCIATICA: Chronic | ICD-10-CM

## 2023-09-15 DIAGNOSIS — Z91.09 MULTIPLE ENVIRONMENTAL ALLERGIES: Chronic | ICD-10-CM

## 2023-09-15 DIAGNOSIS — E55.9 VITAMIN D DEFICIENCY: Chronic | ICD-10-CM

## 2023-09-15 DIAGNOSIS — R74.8 ELEVATED LIVER ENZYMES: Chronic | ICD-10-CM

## 2023-09-15 DIAGNOSIS — G89.29 CHRONIC BILATERAL LOW BACK PAIN WITHOUT SCIATICA: Chronic | ICD-10-CM

## 2023-09-15 DIAGNOSIS — Z87.442 HISTORY OF NEPHROLITHIASIS: Chronic | ICD-10-CM

## 2023-09-15 DIAGNOSIS — E03.8 SUBCLINICAL HYPOTHYROIDISM: Chronic | ICD-10-CM

## 2023-09-15 DIAGNOSIS — I10 BENIGN ESSENTIAL HYPERTENSION: Chronic | ICD-10-CM

## 2023-09-15 DIAGNOSIS — G40.109 TEMPORAL LOBE EPILEPSY: Primary | Chronic | ICD-10-CM

## 2023-09-15 DIAGNOSIS — R73.01 IMPAIRED FASTING GLUCOSE: Chronic | ICD-10-CM

## 2023-09-15 DIAGNOSIS — R44.2 GUSTATORY HALLUCINATIONS: Chronic | ICD-10-CM

## 2023-09-15 DIAGNOSIS — E79.0 ASYMPTOMATIC HYPERURICEMIA: Chronic | ICD-10-CM

## 2023-09-15 DIAGNOSIS — Z00.00 ROUTINE PHYSICAL EXAMINATION: Primary | ICD-10-CM

## 2023-09-15 DIAGNOSIS — E66.01 MORBID OBESITY: Chronic | ICD-10-CM

## 2023-09-15 DIAGNOSIS — Z51.81 THERAPEUTIC DRUG MONITORING: ICD-10-CM

## 2023-09-15 DIAGNOSIS — G47.33 OBSTRUCTIVE SLEEP APNEA: Chronic | ICD-10-CM

## 2023-09-15 DIAGNOSIS — E78.2 MIXED HYPERLIPIDEMIA: Chronic | ICD-10-CM

## 2023-09-15 DIAGNOSIS — G40.109 TEMPORAL LOBE EPILEPSY: Chronic | ICD-10-CM

## 2023-09-15 PROBLEM — Z09 HOSPITAL DISCHARGE FOLLOW-UP: Status: RESOLVED | Noted: 2023-08-30 | Resolved: 2023-09-15

## 2023-09-15 PROBLEM — R51.9 HEADACHE: Status: RESOLVED | Noted: 2023-08-15 | Resolved: 2023-09-15

## 2023-09-15 PROBLEM — R42 DIZZINESS: Status: RESOLVED | Noted: 2023-08-15 | Resolved: 2023-09-15

## 2023-09-15 RX ORDER — LEVETIRACETAM 1000 MG/1
TABLET ORAL
Qty: 60 TABLET | Refills: 6 | Status: SHIPPED | OUTPATIENT
Start: 2023-09-15

## 2023-09-16 DIAGNOSIS — E78.2 MIXED HYPERLIPIDEMIA: Chronic | ICD-10-CM

## 2023-09-16 DIAGNOSIS — E55.9 VITAMIN D DEFICIENCY: Chronic | ICD-10-CM

## 2023-09-16 DIAGNOSIS — E79.0 ASYMPTOMATIC HYPERURICEMIA: Chronic | ICD-10-CM

## 2023-09-16 DIAGNOSIS — I10 BENIGN ESSENTIAL HYPERTENSION: Chronic | ICD-10-CM

## 2023-09-18 RX ORDER — ALLOPURINOL 300 MG/1
TABLET ORAL
Qty: 30 TABLET | Refills: 4 | Status: SHIPPED | OUTPATIENT
Start: 2023-09-18

## 2023-09-18 RX ORDER — EZETIMIBE AND SIMVASTATIN 10; 40 MG/1; MG/1
TABLET ORAL
Qty: 30 TABLET | Refills: 4 | Status: SHIPPED | OUTPATIENT
Start: 2023-09-18

## 2023-09-18 RX ORDER — CHOLECALCIFEROL (VITAMIN D3) 1250 MCG
CAPSULE ORAL
Qty: 12 CAPSULE | Refills: 0 | Status: SHIPPED | OUTPATIENT
Start: 2023-09-18

## 2023-09-18 RX ORDER — METOPROLOL SUCCINATE 25 MG/1
TABLET, EXTENDED RELEASE ORAL
Qty: 30 TABLET | Refills: 0 | Status: SHIPPED | OUTPATIENT
Start: 2023-09-18

## 2023-09-19 ENCOUNTER — OFFICE VISIT (OUTPATIENT)
Dept: NEUROLOGY | Facility: CLINIC | Age: 53
End: 2023-09-19
Payer: COMMERCIAL

## 2023-09-19 VITALS
BODY MASS INDEX: 41.75 KG/M2 | DIASTOLIC BLOOD PRESSURE: 84 MMHG | HEART RATE: 103 BPM | SYSTOLIC BLOOD PRESSURE: 132 MMHG | OXYGEN SATURATION: 98 % | RESPIRATION RATE: 18 BRPM | WEIGHT: 315 LBS | HEIGHT: 73 IN

## 2023-09-19 DIAGNOSIS — R44.2 GUSTATORY HALLUCINATIONS: Primary | Chronic | ICD-10-CM

## 2023-09-19 PROBLEM — G40.109 TEMPORAL LOBE EPILEPSY: Chronic | Status: RESOLVED | Noted: 2023-08-30 | Resolved: 2023-09-19

## 2023-09-19 NOTE — ASSESSMENT & PLAN NOTE
53 year old right handed man with possible temporal lobe seizures with gustatory hallucinations.  He tells me about a year and half ago he started having memory problems and some tip of the tongue phenomena.  A year ago he started having some headaches.  5-6 months ago he started getting some dizzy spells.  He started smelling things that were not there and specifically dirty ashtrays and cigarette smokes that were not there.  With the phantom smells he was concerned and was referred to the ED.  He had a head CT scan and brain MRI scan on 8/15/2023 which I reviewed the images independently on his visit today and looks good with no acute intracranial abnormalities.  He had a normal routine awake and sleep EEG at the hospital and was started on levetiracetam 1000 mg BID.  He tells me his symptoms seem to have been getting better but they have not fully resolved.  Before going to the ED he was having the symptom 80% of the time but now he is getting it maybe once a day and it is not as strong.  He has had significant stress over the past 18 months in his work and family life.  There is family history of headaches in his sister and he gets occasional mild headaches for which he does not need to take any medicine and resolve spontaneously within 20 minutes.  He does not think he has ever had COVID 19 that was diagnosed.  He does report having sinus issues.  No history of head trauma.  No family history of seizures.  No history of meningitis or encephalitis.  No history of febrile seizures in childhood.  I spoke with him about potential causes of his symptoms.  At this time I am not confident that he has temporal lobe epilepsy with normal brain MRI and routine awake and sleep EEG.  The Keppra is making him sleepy and fatigued so I will cut back on the Keppra to 500 mg BID slowly to see how he does with this medicine tolerating it and also what happens to these symptoms.  I will order a prolonged 4 hour continuous video  EEG monitoring on outpatient basis depending on how he is doing with cutting back on this medicine.  I advised him to sleep well and use his CPAP.  Also try to reduce stress level with some mindful meditation or yoga or exercise.  Discussed seizure precautions.

## 2023-09-19 NOTE — PROGRESS NOTES
Chief Complaint  Possible seizures    Subjective          Victoriano Rowe presents to [facility] NEUROLOGY for   HISTORY OF PRESENT ILLNESS:    Victoriano Rowe is a 53 year old right handed man who presents to neurology clinic for initial evaluation and treatment of possible seizures.  He tells me about a year and half ago he started having memory problems and some tip of the tongue phenomena.  A year ago he started having some headaches.  5-6 months ago he started getting some dizzy spells.  He started smelling things that were not there and specifically dirty ashtrays and cigarette smokes that were not there.  With the phantom smells he was concerned and was referred to the ED.  He had a head CT scan and brain MRI scan on 8/15/2023 which I reviewed the images independently on his visit today and looks good with no acute intracranial abnormalities.  He had a normal routine awake and sleep EEG at the hospital and was started on levetiracetam 1000 mg BID.  He tells me his symptoms seem to have been getting better but they have not fully resolved.  Before going to the ED he was having the symptom 80% of the time but now he is getting it maybe once a day and it is not as strong.  He has had significant stress over the past 18 months in his work and family life.  There is family history of headaches in his sister and he gets occasional mild headaches for which he does not need to take any medicine and resolve spontaneously within 20 minutes.  He does not think he has ever had COVID 19 that was diagnosed.  He does report having sinus issues.  No history of head trauma.  No family history of seizures.  No history of meningitis or encephalitis.  No history of febrile seizures in childhood.  He had lab work which I reviewed including CMP, TSH, Vit B12, folate, ammonia and Vit D levels.      Past Medical History:   Diagnosis Date    Allergic rhinitis 01/22/2016    Patient has never had allergy testing.   Symptoms are seasonal.    Asymptomatic hyperuricemia 08/25/2011 08/08/2015--patient seen in follow-up tolerated the lower well.  Uric acid upper limit of normal at 6.7.  Uloric increased to 80 mg per day.  06/08/2015--uric acid not at goal.  Started on Uloric 40 mg per day.  08/25/2011--patient was noted to have elevated uric acid but has never had an episode of gout.  Allopurinol was initiated in hopes that this would reduce kidney stones.    Bell palsy 1998    Lasted about a month.    Benign essential hypertension 05/28/2008 02/16/2016--patient has lost about 60 pounds and his blood pressure is running low on half a dose of Benicar HCT 40/25.  Blood pressure medications discontinued except for metoprolol.  05/28/2008--treatment for hypertension begun.    Borderline Hypogonadism male 08/25/2011 03/21/2017--patient seen in follow-up and he feels well and does not feel any different off of TRT.  His total testosterone is borderline low at 347.  His free and weakly bound testosterone is in the normal range at 77.0.  Patient needs to stay off of testosterone replacement therapy and we will continue to monitor.  06/30/2016--patient seen for a routine physical examination.  He reports that TRT    Chronic bilateral low back pain without sciatica 06/19/2010    10/17/2014--patient presents with a five-day history of low back pain without radiation into the lower extremities.  No known trauma or injury.  He was making up the bed when this occurred.  No bowel or bladder symptoms.  Patient has a known history of herniated nucleus pulposus and bulging disks.  He has been taking some generic Aleve and this has not really helped much.  He had some leftover hyd    Elevated liver enzymes 07/27/2017 02/06/2019--routine physical.  AST normal at 28, ALT mildly elevated at 60.  Suspect early PAGE despite normal liver ultrasound.  07/27/2017--routine physical.  AST elevated at 52, ALT elevated at 97.  Hepatitis C antibody  screening and hepatitis B surface antigen ordered.  Repeat CMP ordered.  Ultrasound liver.  Hepatitis C and hepatitis B returned negative.  Repeat CMP showed improved liver enzy    Headache, tension-type     History of nephrolithiasis 07/19/2011 08/25/2011-- allopurinol initiated for hyperuricemia.  07/19/2011--patient presented with right flank pain with radiation into the right back. CT scan of the abdomen revealed a 4 mm stone in the dependent aspect of the urinary bladder.  Likely recent passage from the right ureter where there is mild right hydronephrosis.  1.6 cm nonobstructing stone at the lower pole of the left kidney and a punctate nonobstructing stone at the upper pole of the left kidney.  Patient passed the stone spontaneously.  Calcium oxalate.    Hyperlipidemia 09/28/2008 09/28/2008--treatment for hyperlipidemia begun    Impaired fasting glucose 04/01/2011 04/01/2011--initial diagnosis of impaired fasting glucose.  Initial hemoglobin A1c normal at 5.5.  A few months later 5.7.  Has never been significantly elevated.    Male erectile disorder 01/22/2016    Memory loss 2022    Mild    Morbid obesity 01/22/2016    Multiple environmental allergies 01/22/2016    Patient has never had allergy testing.  Symptoms are seasonal.    Obstructive sleep apnea, 2008--tolerates CPAP well. 01/22/2016    Diagnosed in spring of 2008.  Tolerates CPAP well.    Subclinical hypothyroidism 02/06/2019 02/06/2019--routine physical.  TSH is slightly elevated at 4.53.  Free T3 and free T4 are normal.  Close observation.    Temporal lobe epilepsy 08/30/2023 August 30, 2023--patient seen in follow-up/transition of care by Dr. Quintanilla.  The hospital documentation reviewed including history and physical, hospital course, neurology consultation, discharge summary and discharge medications.     Date of Admission: 8/15/2023 date of discharge: August 17, 2023   LOS: 0 days   PCP: Alex Quintanilla MD     Subjective patient  "feeling well this afternoon, voices    Vitamin D deficiency 01/22/2016        Family History   Problem Relation Age of Onset    Alcohol abuse Father     Heart failure Father     Emphysema Father     Diabetes Father     Diabetes Sister         Social History     Socioeconomic History    Marital status:     Number of children: 0    Highest education level: Some college, no degree   Tobacco Use    Smoking status: Never    Smokeless tobacco: Never    Tobacco comments:     An occasional cigar, but not a regular smoker   Vaping Use    Vaping Use: Never used   Substance and Sexual Activity    Alcohol use: Yes     Alcohol/week: 2.0 standard drinks     Types: 2 Cans of beer per week     Comment: Social alcohol consumption    Drug use: No    Sexual activity: Yes     Partners: Female     Birth control/protection: OCP        I have reviewed and confirmed the accuracy of the ROS as documented by the MA/LPN/RN Anabel Zamora MD   Review of Systems   Neurological:  Positive for light-headedness and headache. Negative for dizziness, tremors, seizures, syncope, facial asymmetry, speech difficulty, weakness, numbness, memory problem and confusion.   Psychiatric/Behavioral:  Negative for agitation, behavioral problems, decreased concentration, dysphoric mood, hallucinations, self-injury, sleep disturbance, suicidal ideas, negative for hyperactivity, depressed mood and stress. The patient is not nervous/anxious.       Objective   Vital Signs:   /84   Pulse 103   Resp 18   Ht 185.4 cm (73\")   Wt (!) 160 kg (352 lb)   SpO2 98%   BMI 46.44 kg/m²       PHYSICAL EXAM:    General   Mental Status - Alert. General Appearance - Well developed, Well groomed, Oriented and Cooperative. Orientation - Oriented X3.       Head and Neck  Head - normocephalic, atraumatic with no lesions or palpable masses.  Neck    Global Assessment - supple.       Eye   Sclera/Conjunctiva - Bilateral - Normal.    ENMT  Mouth and Throat   Oral " Cavity/Oropharynx: Oropharynx - the soft palate,uvula and tongue are normal in appearance.    Chest and Lung Exam   Chest - lung clear to auscultation bilaterally.    Cardiovascular   Cardiovascular examination reveals  - normal heart sounds, regular rate and rhythm.    Neurologic   Mental Status: Speech - Normal. Cognitive function - appropriate fund of knowledge. No impairment of attention, Impairment of concentration, impairment of long term memory or impairment of short term memory.  Cranial Nerves:   II Optic: Visual acuity - Left - Normal. Right - Normal. Visual fields - Normal (to confrontation).  III Oculomotor: Pupillary constriction - Left - Normal. Right - Normal.  VII Facial: - Normal Bilaterally.   IX Glossopharyngeal / X Vagus - Normal.  XI Accessory: Trapezius - Bilateral - Normal. Sternocleidomastoid - Bilateral - Normal.  XII Hypoglossal - Bilateral - Normal.  Eye Movements: - Normal Bilaterally.  Sensory:   Light Touch: Intact - Globally.  Motor:   Bulk and Contour: - Normal.  Tone: - Normal.  Tremor: Not present.  Strength: 5/5 normal muscle strength - All Muscles.   General Assessment of Reflexes: - deep tendon reflexes are normal. Coordination - No Impairment of finger-to-nose or Impairment of rapid alternating movements. Gait - Normal.       Result Review :                 Assessment and Plan    Problem List Items Addressed This Visit          Symptoms and Signs    Gustatory hallucinations - Primary (Chronic)    Overview     Date of Admission: 8/15/2023   LOS: 0 days   PCP: Alex Quintanilla MD     Subjective patient feeling well this afternoon, voices no complaints.  Reports he is still experiencing gustatory hallucinations.     Hospital Outcome:    53-year-old male admitted to the observation unit for further evaluation of intermittent forgetfulness, nasal hallucinations, dizziness, and headaches.  Patient had MRI brain with and without contrast that is negative acute.  Patient was seen by  neurology, discussed with Dr. Becker.  She suspects patient is having temporal lobe/ partial complex seizures.  EEG obtained which is normal.  Patient still having gustatory hallucinations so neurology recommends increasing Keppra to 1000 mg twice daily.  Patient will follow-up with neurology.  Usual return to ER precautions discussed with patient who expresses understanding and is in agreement with plan.            Current Assessment & Plan     53 year old right handed man with possible temporal lobe seizures with gustatory hallucinations.  He tells me about a year and half ago he started having memory problems and some tip of the tongue phenomena.  A year ago he started having some headaches.  5-6 months ago he started getting some dizzy spells.  He started smelling things that were not there and specifically dirty ashtrays and cigarette smokes that were not there.  With the phantom smells he was concerned and was referred to the ED.  He had a head CT scan and brain MRI scan on 8/15/2023 which I reviewed the images independently on his visit today and looks good with no acute intracranial abnormalities.  He had a normal routine awake and sleep EEG at the hospital and was started on levetiracetam 1000 mg BID.  He tells me his symptoms seem to have been getting better but they have not fully resolved.  Before going to the ED he was having the symptom 80% of the time but now he is getting it maybe once a day and it is not as strong.  He has had significant stress over the past 18 months in his work and family life.  There is family history of headaches in his sister and he gets occasional mild headaches for which he does not need to take any medicine and resolve spontaneously within 20 minutes.  He does not think he has ever had COVID 19 that was diagnosed.  He does report having sinus issues.  No history of head trauma.  No family history of seizures.  No history of meningitis or encephalitis.  No history of febrile  seizures in childhood.  I spoke with him about potential causes of his symptoms.  At this time I am not confident that he has temporal lobe epilepsy with normal brain MRI and routine awake and sleep EEG.  The Keppra is making him sleepy and fatigued so I will cut back on the Keppra to 500 mg BID slowly to see how he does with this medicine tolerating it and also what happens to these symptoms.  I will order a prolonged 4 hour continuous video EEG monitoring on outpatient basis depending on how he is doing with cutting back on this medicine.  I advised him to sleep well and use his CPAP.  Also try to reduce stress level with some mindful meditation or yoga or exercise.  Discussed seizure precautions.           Relevant Orders    EEG       I spent 40 minutes caring for Victoriano on this date of service. This time includes time spent by me in the following activities:preparing for the visit, reviewing tests, obtaining and/or reviewing a separately obtained history, performing a medically appropriate examination and/or evaluation , counseling and educating the patient/family/caregiver, ordering medications, tests, or procedures, documenting information in the medical record, independently interpreting results and communicating that information with the patient/family/caregiver, and care coordination    Follow Up   Return in about 3 months (around 12/19/2023).  Patient was given instructions and counseling regarding his condition or for health maintenance advice. Please see specific information pulled into the AVS if appropriate.

## 2023-09-19 NOTE — PATIENT INSTRUCTIONS
[facility]  Anabel Zamora MD  Neurology clinic  [phone]    With anti-seizure medications, you may initially notice side effects of fatigue, drowsiness, unsteadiness, and dizziness.  Other possible side effects include nausea, abdominal pain, headache, blurry or double vision, slurred speech and mood changes.  Generally, patients will noticed these symptoms when the medication is first started or with higher doses and will go away with time.    It is import to consistently take your medication every day.  Missing just one dose may put you at risk for a breakthrough seizure.  Consider using reminders on your phone or a pill box.    If you develop a rash, please call the neurology clinic immediately or notify another healthcare professional, as this may be potentially life-threatening.  If you are unable to reach a healthcare professional, go to the emergency room immediately for further evaluation.    If you develop thoughts of wanting to hurt yourself or others, please call the neurology clinic immediately to notify another healthcare professional.  If you are unable to reach a healthcare professional, go to the emergency room immediately for further evaluation.    It is the [place] state law that you cannot drive within 90 days of a seizure.    You should avoid certain activities that if you were to have a seizure, you could harm yourself or others. In general, it is recommended that you avoid operating heavy machinery or power tools, swimming or taking baths by yourself (showers are ok), don't stand over open flames, don't get on high ladders or the roof.  I also recommend to avoid sleeping on your stomach.    For further information on epilepsy and resources available to patients and their families, please visit the [facility] at www.efky.org or call [phone].    **Check out the [facility]'s monthly Art Group Gathering.  They are located  at [facility], [address] [place] [address].  Call Kina Scott at [phone] or email her at [email or web address] for the dates of future gatherings.**      **If you have having memory problems, consider HOBSCOTCH (Home-Based Self-management and Cognitive Training Changes lives).  It is an 8 week self-management program for adults with epilepsy and memory problems.  The program is free at the [facility].  Contact Kimberli Ramon at [phone] or [email or web address].**         In general, we recommend using good judgement when you are doing certain activities and to avoid those activities that if you were to have a seizure, you could harm yourself or others. In the [facility], it is the law that you cannot drive within 90 days of a seizure. We also recommend not standing over open flames, not getting on high ladders or the roof, not swimming or taking baths by yourself (showers are ok) and not operating heavy machinery or power tools.

## 2023-09-29 ENCOUNTER — PATIENT ROUNDING (BHMG ONLY) (OUTPATIENT)
Dept: NEUROLOGY | Facility: CLINIC | Age: 53
End: 2023-09-29
Payer: COMMERCIAL

## 2023-10-15 DIAGNOSIS — E55.9 VITAMIN D DEFICIENCY: Chronic | ICD-10-CM

## 2023-10-16 DIAGNOSIS — I10 BENIGN ESSENTIAL HYPERTENSION: Chronic | ICD-10-CM

## 2023-10-16 RX ORDER — METOPROLOL SUCCINATE 25 MG/1
TABLET, EXTENDED RELEASE ORAL
Qty: 30 TABLET | Refills: 0 | Status: SHIPPED | OUTPATIENT
Start: 2023-10-16

## 2023-10-16 RX ORDER — CHOLECALCIFEROL (VITAMIN D3) 1250 MCG
CAPSULE ORAL
Qty: 12 CAPSULE | Refills: 0 | Status: SHIPPED | OUTPATIENT
Start: 2023-10-16

## 2023-10-30 ENCOUNTER — OFFICE VISIT (OUTPATIENT)
Dept: INTERNAL MEDICINE | Facility: CLINIC | Age: 53
End: 2023-10-30
Payer: COMMERCIAL

## 2023-10-30 ENCOUNTER — TELEPHONE (OUTPATIENT)
Dept: INTERNAL MEDICINE | Facility: CLINIC | Age: 53
End: 2023-10-30

## 2023-10-30 VITALS
HEIGHT: 73 IN | SYSTOLIC BLOOD PRESSURE: 142 MMHG | TEMPERATURE: 97.3 F | DIASTOLIC BLOOD PRESSURE: 78 MMHG | RESPIRATION RATE: 16 BRPM | HEART RATE: 94 BPM | BODY MASS INDEX: 41.75 KG/M2 | OXYGEN SATURATION: 96 % | WEIGHT: 315 LBS

## 2023-10-30 DIAGNOSIS — Z87.442 HISTORY OF NEPHROLITHIASIS: Chronic | ICD-10-CM

## 2023-10-30 DIAGNOSIS — E11.9 TYPE 2 DIABETES MELLITUS WITHOUT COMPLICATION, WITHOUT LONG-TERM CURRENT USE OF INSULIN: ICD-10-CM

## 2023-10-30 DIAGNOSIS — E03.8 SUBCLINICAL HYPOTHYROIDISM: Chronic | ICD-10-CM

## 2023-10-30 DIAGNOSIS — E79.0 ASYMPTOMATIC HYPERURICEMIA: Chronic | ICD-10-CM

## 2023-10-30 DIAGNOSIS — E66.01 MORBID OBESITY: Chronic | ICD-10-CM

## 2023-10-30 DIAGNOSIS — Z00.00 ROUTINE PHYSICAL EXAMINATION: Primary | ICD-10-CM

## 2023-10-30 DIAGNOSIS — G89.29 CHRONIC BILATERAL LOW BACK PAIN WITHOUT SCIATICA: Chronic | ICD-10-CM

## 2023-10-30 DIAGNOSIS — I10 BENIGN ESSENTIAL HYPERTENSION: Chronic | ICD-10-CM

## 2023-10-30 DIAGNOSIS — G47.33 OBSTRUCTIVE SLEEP APNEA: Chronic | ICD-10-CM

## 2023-10-30 DIAGNOSIS — Z91.09 MULTIPLE ENVIRONMENTAL ALLERGIES: Chronic | ICD-10-CM

## 2023-10-30 DIAGNOSIS — E55.9 VITAMIN D DEFICIENCY: Chronic | ICD-10-CM

## 2023-10-30 DIAGNOSIS — Z23 NEED FOR INFLUENZA VACCINATION: ICD-10-CM

## 2023-10-30 DIAGNOSIS — M54.50 CHRONIC BILATERAL LOW BACK PAIN WITHOUT SCIATICA: Chronic | ICD-10-CM

## 2023-10-30 DIAGNOSIS — Z51.81 THERAPEUTIC DRUG MONITORING: ICD-10-CM

## 2023-10-30 DIAGNOSIS — R73.01 IMPAIRED FASTING GLUCOSE: Chronic | ICD-10-CM

## 2023-10-30 DIAGNOSIS — E78.2 MIXED HYPERLIPIDEMIA: Chronic | ICD-10-CM

## 2023-10-30 DIAGNOSIS — E11.9 ENCOUNTER FOR DIABETIC FOOT EXAM: Chronic | ICD-10-CM

## 2023-10-30 DIAGNOSIS — R74.8 ELEVATED LIVER ENZYMES: Chronic | ICD-10-CM

## 2023-10-30 DIAGNOSIS — G40.109 TEMPORAL LOBE EPILEPSY: Chronic | ICD-10-CM

## 2023-10-30 PROBLEM — R44.2: Chronic | Status: RESOLVED | Noted: 2023-08-30 | Resolved: 2023-10-30

## 2023-10-30 RX ORDER — LEVETIRACETAM 500 MG/1
TABLET ORAL
Start: 2023-10-30

## 2023-10-30 RX ORDER — METOPROLOL SUCCINATE 25 MG/1
TABLET, EXTENDED RELEASE ORAL
Qty: 90 TABLET | Refills: 1 | Status: SHIPPED | OUTPATIENT
Start: 2023-10-30

## 2023-10-30 NOTE — TELEPHONE ENCOUNTER
Caller: [facility], [place] - [address] [phone] [facility] [phone] FX    Relationship: Pharmacy    Best call back number: [phone]    Requested Prescriptions:   Requested Prescriptions     Pending Prescriptions Disp Refills    metoprolol succinate XL (TOPROL-XL) 25 MG 24 hr tablet 30 tablet 0     Sig: Take 1 tablet by mouth every morning for high blood pressure.        Pharmacy where request should be sent: [facility] [address] [facility] [address] [phone] [facility] [phone] FX     Last office visit with prescribing clinician: 10/30/2023   Last telemedicine visit with prescribing clinician: Visit date not found   Next office visit with prescribing clinician: 2/29/2024     Additional details provided by patient: PLEASE REFILL AS A 90 DAY SUPPLY     Does the patient have less than a 3 day supply:  [x] Yes  [] No    Would you like a call back once the refill request has been completed: [] Yes [] No    If the office needs to give you a call back, can they leave a voicemail: [] Yes [] No    Vivian Jackson Rep   10/30/23 14:01 EDT

## 2023-10-30 NOTE — TELEPHONE ENCOUNTER
"  Caller: Victoriano Rowe \"Zach\"    Relationship: Self    Best call back number:[phone]    What is the best time to reach you: ANYTIME    Who are you requesting to speak with (clinical staff, provider,  specific staff member):CLINICAL    What was the call regarding:PATIENT WANTED TO GIVE DR FAITH HIS INSURANCE PRIOR AUTHORIZATION DEPARTMENT IS-[id number] AND SHREYA WILL NEED A PRIOR AUTHORIZATION   "

## 2023-10-30 NOTE — PROGRESS NOTES
10/30/2023    Patient Information  Victoriano Rowe                                                                                          [id number] [place] [address]      1970  [unfilled]  [phone] (work)    Chief Complaint:     Routine annual physical examination/preventative visit.  No new acute complaints.    History of Present Illness:    Patient with multiple chronic medical problems as noted below in the assessment and plan presents today for his routine annual physical/preventative visit.  His past medical history reviewed and updated were necessary including health maintenance parameters.  This reveals he may need influenza vaccine as well as Shingrix vaccine.  See below.    Review of Systems   Constitutional: Negative.   HENT: Negative.     Eyes: Negative.    Cardiovascular: Negative.    Respiratory: Negative.     Endocrine: Negative.    Hematologic/Lymphatic: Negative.    Skin: Negative.    Musculoskeletal: Negative.    Gastrointestinal: Negative.    Genitourinary: Negative.    Neurological: Negative.    Psychiatric/Behavioral: Negative.     Allergic/Immunologic: Negative.        Active Problems:    Patient Active Problem List   Diagnosis    Allergic rhinitis    Asymptomatic hyperuricemia    Benign essential hypertension    Chronic bilateral low back pain without sciatica    Hyperlipidemia    Borderline Hypogonadism male    Male erectile disorder    Morbid obesity    Obstructive sleep apnea, 2008--tolerates CPAP well.    Vitamin D deficiency    History of nephrolithiasis    Therapeutic drug monitoring    Routine physical examination    Multiple environmental allergies    Elevated liver enzymes    Subclinical hypothyroidism    Diverticulosis of colon    Type 2 diabetes mellitus without complication, without long-term current use of insulin    Encounter for diabetic foot exam         Past Medical History:   Diagnosis Date    Allergic rhinitis 01/22/2016    Patient has  never had allergy testing.  Symptoms are seasonal.    Asymptomatic hyperuricemia 08/25/2011 08/08/2015--patient seen in follow-up tolerated the lower well.  Uric acid upper limit of normal at 6.7.  Uloric increased to 80 mg per day.  06/08/2015--uric acid not at goal.  Started on Uloric 40 mg per day.  08/25/2011--patient was noted to have elevated uric acid but has never had an episode of gout.  Allopurinol was initiated in hopes that this would reduce kidney stones.    Benign essential hypertension 05/28/2008 02/16/2016--patient has lost about 60 pounds and his blood pressure is running low on half a dose of Benicar HCT 40/25.  Blood pressure medications discontinued except for metoprolol.  05/28/2008--treatment for hypertension begun.    Borderline Hypogonadism male 08/25/2011 03/21/2017--patient seen in follow-up and he feels well and does not feel any different off of TRT.  His total testosterone is borderline low at 347.  His free and weakly bound testosterone is in the normal range at 77.0.  Patient needs to stay off of testosterone replacement therapy and we will continue to monitor.  06/30/2016--patient seen for a routine physical examination.  He reports that TRT    Chronic bilateral low back pain without sciatica 06/19/2010    10/17/2014--patient presents with a five-day history of low back pain without radiation into the lower extremities.  No known trauma or injury.  He was making up the bed when this occurred.  No bowel or bladder symptoms.  Patient has a known history of herniated nucleus pulposus and bulging disks.  He has been taking some generic Aleve and this has not really helped much.  He had some leftover hyd    Elevated liver enzymes 07/27/2017 02/06/2019--routine physical.  AST normal at 28, ALT mildly elevated at 60.  Suspect early PAGE despite normal liver ultrasound.  07/27/2017--routine physical.  AST elevated at 52, ALT elevated at 97.  Hepatitis C antibody screening and  hepatitis B surface antigen ordered.  Repeat CMP ordered.  Ultrasound liver.  Hepatitis C and hepatitis B returned negative.  Repeat CMP showed improved liver enzy    History of Bell's palsy 1998    Lasted about a month.    History of nephrolithiasis 07/19/2011 08/25/2011-- allopurinol initiated for hyperuricemia.  07/19/2011--patient presented with right flank pain with radiation into the right back. CT scan of the abdomen revealed a 4 mm stone in the dependent aspect of the urinary bladder.  Likely recent passage from the right ureter where there is mild right hydronephrosis.  1.6 cm nonobstructing stone at the lower pole of the left kidney and a punctate nonobstructing stone at the upper pole of the left kidney.  Patient passed the stone spontaneously.  Calcium oxalate.    Hyperlipidemia 09/28/2008 09/28/2008--treatment for hyperlipidemia begun    Male erectile disorder 01/22/2016    Morbid obesity 01/22/2016    Multiple environmental allergies 01/22/2016    Patient has never had allergy testing.  Symptoms are seasonal.    Obstructive sleep apnea, 2008--tolerates CPAP well. 01/22/2016    Diagnosed in spring of 2008.  Tolerates CPAP well.    Subclinical hypothyroidism 02/06/2019 02/06/2019--routine physical.  TSH is slightly elevated at 4.53.  Free T3 and free T4 are normal.  Close observation.    Temporal lobe epilepsy 08/30/2023 August 30, 2023--patient seen in follow-up/transition of care by Dr. Quintanilla.  The hospital documentation reviewed including history and physical, hospital course, neurology consultation, discharge summary and discharge medications.     Date of Admission: 8/15/2023 date of discharge: August 17, 2023   LOS: 0 days   PCP: Alex Quintanilla MD     Subjective patient feeling well this afternoon, voices    Type 2 diabetes mellitus without complication, without long-term current use of insulin 10/30/2023    October 30, 2023--routine physical.  Hemoglobin A1c elevated at 6.6.   Patient has morbid obesity and have strongly encouraged low carbohydrate diet, exercise, and weight loss.  We will reassess in 4 months with lab prior.     04/01/2011--initial diagnosis of impaired fasting glucose.  Initial hemoglobin A1c normal at 5.5.  A few months later 5.7.  Has never been significantly elevated.    Vitamin D deficiency 01/22/2016         Past Surgical History:   Procedure Laterality Date    COLONOSCOPY N/A 03/18/2022 March 18, 2022--colonoscopy revealed normal terminal ileum.  Few medium mouth diverticuli were found in the sigmoid colon.  Nonbleeding internal hemorrhoids grade 2.    KIDNEY SURGERY Left     Age [age].  Patient had some sort of swelling of his left kidney, possibly a cyst.  Surgical resection.  No further problems.         No Known Allergies        Current Outpatient Medications:     allopurinol (ZYLOPRIM) 300 MG tablet, Take 1 tablet by mouth daily for gout/elevated uric acid., Disp: 30 tablet, Rfl: 4    Aspirin Low Dose 81 MG EC tablet, Take 1 tablet by mouth daily., Disp: 90 tablet, Rfl: 0    ezetimibe-simvastatin (VYTORIN) 10-40 MG per tablet, Take 1 tablet by mouth daily for high cholesterol. **Please schedule and keep appointment for further refills.**, Disp: 30 tablet, Rfl: 4    metoprolol succinate XL (TOPROL-XL) 25 MG 24 hr tablet, Take 1 tablet by mouth every morning for high blood pressure., Disp: 30 tablet, Rfl: 0    Multiple Vitamins tablet, Take 1 tablet by mouth Daily., Disp: , Rfl:     sildenafil (VIAGRA) 100 MG tablet, Take 1 tablet by mouth Daily As Needed for erectile dysfunction., Disp: 5 tablet, Rfl: 5    levETIRAcetam (Keppra) 500 MG tablet, Take 1 p.o. twice daily as directed by neurologist, Disp: , Rfl:       Family History   Problem Relation Age of Onset    Alcohol abuse Father     Heart failure Father     Emphysema Father     Diabetes Father     Diabetes Sister          Social History     Socioeconomic History    Marital status:     Number of  "children: 0    Highest education level: Some college, no degree   Tobacco Use    Smoking status: Never    Smokeless tobacco: Never    Tobacco comments:     An occasional cigar, but not a regular smoker   Vaping Use    Vaping Use: Never used   Substance and Sexual Activity    Alcohol use: Yes     Alcohol/week: 2.0 standard drinks of alcohol     Types: 2 Cans of beer per week     Comment: Social alcohol consumption    Drug use: No    Sexual activity: Yes     Partners: Female     Birth control/protection: OCP         Vitals:    10/30/23 0926   BP: 142/78   Pulse: 94   Resp: 16   Temp: 97.3 °F (36.3 °C)   TempSrc: Temporal   SpO2: 96%   Weight: (!) 161 kg (354 lb)   Height: 185.4 cm (73\")        Body mass index is 46.7 kg/m².      Physical Exam:    General: Alert and oriented x 3.  No acute distress.  Morbidly obese. normal affect.  HEENT: Pupils equal, round, reactive to light; extraocular movements intact; sclerae nonicteric; pharynx, ear canals and TMs normal.  Neck: Without JVD, thyromegaly, bruit, or adenopathy.  Lungs: Clear to auscultation in all fields.  Heart: Regular rate and rhythm without murmur, rub, gallop, or click.  Abdomen: Soft, nontender, without hepatosplenomegaly or hernia.  Bowel sounds normal.  : Deferred.  Rectal: Deferred.  Extremities: Without clubbing, cyanosis, edema, or pulse deficit.  Neurologic: Intact without focal deficit.  Normal station and gait observed during ingress and egress from the examination room.  Skin: Without significant lesion.  Musculoskeletal: Unremarkable.    October 30, 2023--diabetic foot exam is unremarkable.  Patient is now monitoring his feet closely.  No ulcers or preulcerative callus.  Sensation and circulation intact.    Lab/other results:    CBC normal.  CPK normal.  CMP normal except glucose 139 and ALT mildly elevated 52.  Hemoglobin A1c 6.6.  Total cholesterol 132, triglycerides 252, LDL particle #558, HDL particle #35.4.  Thyroid function tests are " normal.  Uric acid normal at 4.8.  Urine microscopic normal except calcium oxalate crystals noted.  PSA normal at 0.414.  Vitamin D upper limits of normal at 99.4.    Assessment/Plan:     Diagnosis Plan   1. Routine physical examination        2. Impaired fasting glucose        3. Subclinical hypothyroidism        4. Hyperlipidemia  CK    Comprehensive Metabolic Panel    NMR LipoProfile      5. Vitamin D deficiency  Vitamin D,25-Hydroxy      6. Elevated liver enzymes  Comprehensive Metabolic Panel      7. Benign essential hypertension  Comprehensive Metabolic Panel      8. Asymptomatic hyperuricemia  Uric Acid      9. Chronic bilateral low back pain without sciatica        10. History of nephrolithiasis        11. Morbid obesity        12. Multiple environmental allergies        13. Obstructive sleep apnea, 2008--tolerates CPAP well.        14. Therapeutic drug monitoring  CBC (No Diff)      15. Need for influenza vaccination  Fluzone >6 Months ([id number])      16. Type 2 diabetes mellitus without complication, without long-term current use of insulin  Comprehensive Metabolic Panel    Hemoglobin A1c    Microalbumin / Creatinine Urine Ratio - Urine, Clean Catch      17. Temporal lobe epilepsy  levETIRAcetam (Keppra) 500 MG tablet      18. Encounter for diabetic foot exam          Patient presents for his routine annual physical exam/preventative visit and has several concerns as follows:    His impaired fasting glucose has evolved into overt diabetes.  Patient has morbid obesity and once again I have strongly encouraged low carbohydrate diet, exercise, and weight loss.  There is no evidence of subclinical hypothyroidism on today's labs but we will continue to monitor.  Hyperlipidemia is under good control on the current regimen.  His vitamin D is almost supratherapeutic and patient needs to discontinue vitamin D supplementation until we reassess again in the future.  Elevated liver enzymes are mild and likely related  to PAGE.  Hypertension is a little borderline and once again weight loss could be helpful.  Hyperuricemia is under good control with allopurinol.  His chronic lower back pain comes and goes and if he would lose weight this would definitely help in regards to the back pain.  Environmental allergies currently controlled.  Patient has sleep apnea which would improve with weight loss as well.  Also patient was having symptoms possibly consistent with temporal lobe epilepsy but he saw the neurologist and does not feel that that is the correct diagnosis.  He is being weaned off the Keppra and then will follow-up in a few months.  His symptoms have totally resolved other than a little bit of memory loss which is not severe.  We will continue to monitor this.    Several preventative health issues discussed including review of vaccinations and recommendations, including dietary issues, exercise and weight loss.  Safe sex practices discussed.  Patient advised to wear seatbelt whenever driving and avoid texting and driving.  Also advised to look both ways before crossing the street.  Colon cancer prevention discussed and is up-to-date with colonoscopy.  Advised to avoid tobacco products and minimize alcohol consumption.    Plan is as follows: I think patient would be an excellent candidate for Ozempic but apparently this is not reimbursed on his insurance.  I have asked patient to check with his insurance and see what medications similar to this would be a covered benefit.  Also strongly encouraged low carbohydrate diet and weight loss.  I explained to patient he is at risk for development of coronary artery disease and heart attack/stroke.  Discontinue vitamin D supplementation immediately and until further notice.  Patient will follow-up in 4 months with fasting lab prior to reassess.  Recommend influenza vaccine which he can get today.  Also recommend Shingrix vaccine and he should check with his insurance regarding  coverage of this as well.      Procedures

## 2023-10-31 DIAGNOSIS — E11.9 TYPE 2 DIABETES MELLITUS WITHOUT COMPLICATION, WITHOUT LONG-TERM CURRENT USE OF INSULIN: Primary | Chronic | ICD-10-CM

## 2023-10-31 RX ORDER — TIRZEPATIDE 2.5 MG/.5ML
2.5 INJECTION, SOLUTION SUBCUTANEOUS WEEKLY
Qty: 2 ML | Refills: 2 | Status: SHIPPED | OUTPATIENT
Start: 2023-10-31

## 2023-11-01 NOTE — TELEPHONE ENCOUNTER
PA has been submitted. Insurance response: This request has been approved using information available on the patient's profile. CaseId:[id number];Status:Approved;Review Type:Prior Auth;Coverage Start Date:10/02/2023;Coverage End Date:10/31/2024;

## 2023-11-09 DIAGNOSIS — G40.109 TEMPORAL LOBE EPILEPSY: Chronic | ICD-10-CM

## 2023-11-09 RX ORDER — LEVETIRACETAM 500 MG/1
TABLET ORAL
Start: 2023-11-09

## 2023-11-09 NOTE — TELEPHONE ENCOUNTER
"Caller: Victoriano Rowe \"Zach\"    Relationship: Self    Best call back number: [phone]    Requested Prescriptions:   Requested Prescriptions     Pending Prescriptions Disp Refills    levETIRAcetam (Keppra) 500 MG tablet       Sig: Take 1 p.o. twice daily as directed by neurologist        Pharmacy where request should be sent: [address] [phone] [facility] [phone]      Last office visit with prescribing clinician: 9/19/2023   Last telemedicine visit with prescribing clinician: Visit date not found   Next office visit with prescribing clinician: 12/19/2023     Additional details provided by patient:  PT NEEDS A REFILL OF  MG KEPPRA.   STATED HE HAS 5 DAYS LEFT    Does the patient have less than a 3 day supply:  [] Yes  [x] No    Would you like a call back once the refill request has been completed: [] Yes [] No    If the office needs to give you a call back, can they leave a voicemail: [] Yes [] No    Vivian Villafuerte Rep   11/09/23 10:40 EST     "

## 2023-11-13 DIAGNOSIS — G40.109 TEMPORAL LOBE EPILEPSY: Chronic | ICD-10-CM

## 2023-11-14 RX ORDER — LEVETIRACETAM 500 MG/1
TABLET ORAL
Start: 2023-11-14 | End: 2023-11-15 | Stop reason: SDUPTHER

## 2023-11-15 DIAGNOSIS — G40.109 TEMPORAL LOBE EPILEPSY: Chronic | ICD-10-CM

## 2023-11-15 RX ORDER — LEVETIRACETAM 500 MG/1
TABLET ORAL
Start: 2023-11-15

## 2023-11-16 DIAGNOSIS — G40.109 TEMPORAL LOBE EPILEPSY: Chronic | ICD-10-CM

## 2023-11-16 RX ORDER — LEVETIRACETAM 500 MG/1
TABLET ORAL
Start: 2023-11-16

## 2023-11-16 NOTE — TELEPHONE ENCOUNTER
"  Caller: Victoriano Rowe \"Zach\"    Relationship: Self    Best call back number: [phone]    Requested Prescriptions:   Requested Prescriptions     Pending Prescriptions Disp Refills    levETIRAcetam (Keppra) 500 MG tablet       Sig: Take 1 p.o. twice daily as directed by neurologist        [facility] [address] [phone] [phone]      Last office visit with prescribing clinician: 10/30/2023   Last telemedicine visit with prescribing clinician: Visit date not found   Next office visit with prescribing clinician: 2/29/2024     Additional details provided by patient: OUT OF MEDICATION    Does the patient have less than a 3 day supply:  [x] Yes  [] No    Would you like a call back once the refill request has been completed: [] Yes [x] No    If the office needs to give you a call back, can they leave a voicemail: [] Yes [x] No    Vivian aHnson Rep   11/16/23 10:43 EST         "

## 2023-11-20 ENCOUNTER — TELEPHONE (OUTPATIENT)
Dept: NEUROLOGY | Facility: CLINIC | Age: 53
End: 2023-11-20
Payer: COMMERCIAL

## 2023-11-21 NOTE — TELEPHONE ENCOUNTER
Notified pt  ian santiago  
PATIENT CALLING TO ASK ABOUT EEG.  HE STATES THE PLAN WAS TO SEE HOW MEDICATION IS GOING AND IF THE EEG IS STILL NEEDED.    PLEASE CONTACT PATIENT AND ADVISE    THANK YOU   
Pt is taking 500mg BID Keppra- Doing well-- no symptoms-- per last note you wanted a  4hr EEG- if he wasn't doing well. Scheduling called to maximus him for an EEG, but he declined until he heard your thoughts. He does have a follow up with you on 12/19 to discuss continuing Keppra etc. How would you like for him to continue?  
within normal limits

## 2023-11-30 DIAGNOSIS — E11.9 TYPE 2 DIABETES MELLITUS WITHOUT COMPLICATION, WITHOUT LONG-TERM CURRENT USE OF INSULIN: Chronic | ICD-10-CM

## 2023-11-30 NOTE — TELEPHONE ENCOUNTER
"  Caller: JaeVictoriano \"Zach\"    Relationship: Self    Best call back number: [phone]    Requested Prescriptions:   Requested Prescriptions     Pending Prescriptions Disp Refills    Tirzepatide (Mounjaro) 2.5 MG/0.5ML solution pen-injector 2 mL 2     Sig: Inject 0.5 mL under the skin into the appropriate area as directed 1 (One) Time Per Week. For diabetes.        Pharmacy where request should be sent: [address] [phone] [facility] [phone] [facility]     Last office visit with prescribing clinician: 10/30/2023   Last telemedicine visit with prescribing clinician: Visit date not found   Next office visit with prescribing clinician: 2/29/2024     Additional details provided by patient: PATIENT STATES THAT HE HAS NOT NOTICED ANY SIDE EFFECTS, AND IT IS WORKING THE WAY IT IS SUPPOSED TO. PLEASE ADVISE PATIENT IF THIS CAN BE INCREASED, OR ADVISE ON NEXT STEPS.     Does the patient have less than a 3 day supply:  [x] Yes  [] No    Would you like a call back once the refill request has been completed: [] Yes [x] No    If the office needs to give you a call back, can they leave a voicemail: [] Yes [x] No    Vivian Schilling Rep   11/30/23 10:05 EST         "

## 2023-12-01 RX ORDER — TIRZEPATIDE 2.5 MG/.5ML
2.5 INJECTION, SOLUTION SUBCUTANEOUS WEEKLY
Qty: 2 ML | Refills: 2 | Status: SHIPPED | OUTPATIENT
Start: 2023-12-01

## 2023-12-19 ENCOUNTER — OFFICE VISIT (OUTPATIENT)
Dept: NEUROLOGY | Facility: CLINIC | Age: 53
End: 2023-12-19
Payer: COMMERCIAL

## 2023-12-19 VITALS
SYSTOLIC BLOOD PRESSURE: 142 MMHG | DIASTOLIC BLOOD PRESSURE: 84 MMHG | HEIGHT: 73 IN | HEART RATE: 86 BPM | BODY MASS INDEX: 41.75 KG/M2 | WEIGHT: 315 LBS

## 2023-12-19 DIAGNOSIS — R44.2 GUSTATORY HALLUCINATIONS: Primary | Chronic | ICD-10-CM

## 2023-12-19 DIAGNOSIS — G40.109 TEMPORAL LOBE EPILEPSY: Chronic | ICD-10-CM

## 2023-12-19 PROCEDURE — 99213 OFFICE O/P EST LOW 20 MIN: CPT | Performed by: PSYCHIATRY & NEUROLOGY

## 2023-12-19 RX ORDER — LEVETIRACETAM 500 MG/1
500 TABLET ORAL 2 TIMES DAILY
Qty: 60 TABLET | Refills: 5 | Status: SHIPPED | OUTPATIENT
Start: 2023-12-19

## 2023-12-19 NOTE — ASSESSMENT & PLAN NOTE
53 year old right handed man with possible temporal lobe seizures.  He tells me about a year and half ago he started having memory problems and some tip of the tongue phenomena.  A year ago he started having some headaches.  5-6 months ago he started getting some dizzy spells.  He started smelling things that were not there and specifically dirty ashtrays and cigarette smokes that were not there.  With the phantom smells he was concerned and was referred to the ED.  He had a head CT scan and brain MRI scan on 8/15/2023 which I reviewed the images independently on his visit today and looks good with no acute intracranial abnormalities.  He had a normal routine awake and sleep EEG at the hospital and was started on levetiracetam 1000 mg BID which he is now taking 500 mg BID and doing well other than some sleepiness.  He tells me his symptoms seem to have been getting better and fully resolved.  Before going to the ED he was having the symptom 80% of the time but now he is getting it maybe once a day and it is not as strong.  He has had significant stress over the past 18 months in his work and family life.  There is family history of headaches in his sister and he gets occasional mild headaches for which he does not need to take any medicine and resolve spontaneously within 20 minutes.  He does not think he has ever had COVID 19 that was diagnosed.  He does report having sinus issues.  No history of head trauma.  No family history of seizures.  No history of meningitis or encephalitis.  No history of febrile seizures in childhood.  He had lab work including CMP, TSH, Vit B12, folate, ammonia and Vit D levels. At this time as he is tolerating the Keppra 500 mg BID well and not having the sensations any longer I will continue this drug.  Discussed seizure precautions.

## 2023-12-19 NOTE — PROGRESS NOTES
Chief Complaint  Hallucinations    Subjective          Victoriano Rowe presents to [facility] NEUROLOGY for   HISTORY OF PRESENT ILLNESS:    Victoriano Rowe is a 53 year old right handed man who presents to neurology clinic for initial evaluation and treatment of possible seizures.  He tells me about a year and half ago he started having memory problems and some tip of the tongue phenomena.  A year ago he started having some headaches.  5-6 months ago he started getting some dizzy spells.  He started smelling things that were not there and specifically dirty ashtrays and cigarette smokes that were not there.  With the phantom smells he was concerned and was referred to the ED.  He had a head CT scan and brain MRI scan on 8/15/2023 which I reviewed the images independently on his visit today and looks good with no acute intracranial abnormalities.  He had a normal routine awake and sleep EEG at the hospital and was started on levetiracetam 1000 mg BID which he is now taking 500 mg BID and doing well other than some sleepiness.  He tells me his symptoms seem to have been getting better and fully resolved.  Before going to the ED he was having the symptom 80% of the time but now he is getting it maybe once a day and it is not as strong.  He has had significant stress over the past 18 months in his work and family life.  There is family history of headaches in his sister and he gets occasional mild headaches for which he does not need to take any medicine and resolve spontaneously within 20 minutes.  He does not think he has ever had COVID 19 that was diagnosed.  He does report having sinus issues.  No history of head trauma.  No family history of seizures.  No history of meningitis or encephalitis.  No history of febrile seizures in childhood.  He had lab work including CMP, TSH, Vit B12, folate, ammonia and Vit D levels.       Past Medical History:   Diagnosis Date    Allergic rhinitis 01/22/2016     Patient has never had allergy testing.  Symptoms are seasonal.    Asymptomatic hyperuricemia 08/25/2011 08/08/2015--patient seen in follow-up tolerated the lower well.  Uric acid upper limit of normal at 6.7.  Uloric increased to 80 mg per day.  06/08/2015--uric acid not at goal.  Started on Uloric 40 mg per day.  08/25/2011--patient was noted to have elevated uric acid but has never had an episode of gout.  Allopurinol was initiated in hopes that this would reduce kidney stones.    Benign essential hypertension 05/28/2008 02/16/2016--patient has lost about 60 pounds and his blood pressure is running low on half a dose of Benicar HCT 40/25.  Blood pressure medications discontinued except for metoprolol.  05/28/2008--treatment for hypertension begun.    Borderline Hypogonadism male 08/25/2011 03/21/2017--patient seen in follow-up and he feels well and does not feel any different off of TRT.  His total testosterone is borderline low at 347.  His free and weakly bound testosterone is in the normal range at 77.0.  Patient needs to stay off of testosterone replacement therapy and we will continue to monitor.  06/30/2016--patient seen for a routine physical examination.  He reports that TRT    Chronic bilateral low back pain without sciatica 06/19/2010    10/17/2014--patient presents with a five-day history of low back pain without radiation into the lower extremities.  No known trauma or injury.  He was making up the bed when this occurred.  No bowel or bladder symptoms.  Patient has a known history of herniated nucleus pulposus and bulging disks.  He has been taking some generic Aleve and this has not really helped much.  He had some leftover hyd    Elevated liver enzymes 07/27/2017 02/06/2019--routine physical.  AST normal at 28, ALT mildly elevated at 60.  Suspect early PAGE despite normal liver ultrasound.  07/27/2017--routine physical.  AST elevated at 52, ALT elevated at 97.  Hepatitis C antibody  screening and hepatitis B surface antigen ordered.  Repeat CMP ordered.  Ultrasound liver.  Hepatitis C and hepatitis B returned negative.  Repeat CMP showed improved liver enzy    History of Bell's palsy 1998    Lasted about a month.    History of nephrolithiasis 07/19/2011 08/25/2011-- allopurinol initiated for hyperuricemia.  07/19/2011--patient presented with right flank pain with radiation into the right back. CT scan of the abdomen revealed a 4 mm stone in the dependent aspect of the urinary bladder.  Likely recent passage from the right ureter where there is mild right hydronephrosis.  1.6 cm nonobstructing stone at the lower pole of the left kidney and a punctate nonobstructing stone at the upper pole of the left kidney.  Patient passed the stone spontaneously.  Calcium oxalate.    Hyperlipidemia 09/28/2008 09/28/2008--treatment for hyperlipidemia begun    Male erectile disorder 01/22/2016    Morbid obesity 01/22/2016    Multiple environmental allergies 01/22/2016    Patient has never had allergy testing.  Symptoms are seasonal.    Obstructive sleep apnea, 2008--tolerates CPAP well. 01/22/2016    Diagnosed in spring of 2008.  Tolerates CPAP well.    Subclinical hypothyroidism 02/06/2019 02/06/2019--routine physical.  TSH is slightly elevated at 4.53.  Free T3 and free T4 are normal.  Close observation.    Temporal lobe epilepsy 08/30/2023 August 30, 2023--patient seen in follow-up/transition of care by Dr. Quintanilla.  The hospital documentation reviewed including history and physical, hospital course, neurology consultation, discharge summary and discharge medications.     Date of Admission: 8/15/2023 date of discharge: August 17, 2023   LOS: 0 days   PCP: Alex Quintanilla MD     Subjective patient feeling well this afternoon, voices    Type 2 diabetes mellitus without complication, without long-term current use of insulin 10/30/2023    October 30, 2023--routine physical.  Hemoglobin A1c elevated  "at 6.6.  Patient has morbid obesity and have strongly encouraged low carbohydrate diet, exercise, and weight loss.  We will reassess in 4 months with lab prior.     04/01/2011--initial diagnosis of impaired fasting glucose.  Initial hemoglobin A1c normal at 5.5.  A few months later 5.7.  Has never been significantly elevated.    Vitamin D deficiency 01/22/2016        Family History   Problem Relation Age of Onset    Alcohol abuse Father     Heart failure Father     Emphysema Father     Diabetes Father     Diabetes Sister         Social History     Socioeconomic History    Marital status:     Number of children: 0    Highest education level: Some college, no degree   Tobacco Use    Smoking status: Never    Smokeless tobacco: Never    Tobacco comments:     An occasional cigar, but not a regular smoker   Vaping Use    Vaping Use: Never used   Substance and Sexual Activity    Alcohol use: Yes     Alcohol/week: 2.0 standard drinks of alcohol     Types: 2 Cans of beer per week     Comment: Social alcohol consumption    Drug use: No    Sexual activity: Yes     Partners: Female     Birth control/protection: OCP        I have reviewed and confirmed the accuracy of the ROS as documented by the MA/LPN/RN Anabel Zamora MD   Review of Systems   Neurological:  Positive for memory problem. Negative for dizziness, tremors, seizures, syncope, facial asymmetry, speech difficulty, weakness, light-headedness, numbness, headache and confusion.   Psychiatric/Behavioral:  Negative for agitation, behavioral problems, decreased concentration, dysphoric mood, hallucinations, self-injury, sleep disturbance, suicidal ideas, negative for hyperactivity, depressed mood and stress. The patient is not nervous/anxious.         Objective   Vital Signs:   /84   Pulse 86   Ht 185.4 cm (72.99\")   Wt (!) 154 kg (340 lb)   BMI 44.87 kg/m²       PHYSICAL EXAM:    General   Mental Status - Alert. General Appearance - Well developed, Well " groomed, Oriented and Cooperative. Orientation - Oriented X3.       Head and Neck  Head - normocephalic, atraumatic with no lesions or palpable masses.  Neck    Global Assessment - supple.       Eye   Sclera/Conjunctiva - Bilateral - Normal.    ENMT  Mouth and Throat   Oral Cavity/Oropharynx: Oropharynx - the soft palate,uvula and tongue are normal in appearance.    Chest and Lung Exam   Chest - lung clear to auscultation bilaterally.    Cardiovascular   Cardiovascular examination reveals  - normal heart sounds, regular rate and rhythm.    Neurologic   Mental Status: Speech - Normal. Cognitive function - appropriate fund of knowledge. No impairment of attention, Impairment of concentration, impairment of long term memory or impairment of short term memory.  Cranial Nerves:   II Optic: Visual acuity - Left - Normal. Right - Normal. Visual fields - Normal (to confrontation).  III Oculomotor: Pupillary constriction - Left - Normal. Right - Normal.  VII Facial: - Normal Bilaterally.   IX Glossopharyngeal / X Vagus - Normal.  XI Accessory: Trapezius - Bilateral - Normal. Sternocleidomastoid - Bilateral - Normal.  XII Hypoglossal - Bilateral - Normal.  Eye Movements: - Normal Bilaterally.  Sensory:   Light Touch: Intact - Globally.  Motor:   Bulk and Contour: - Normal.  Tone: - Normal.  Tremor: Not present.  Strength: 5/5 normal muscle strength - All Muscles.   General Assessment of Reflexes: - deep tendon reflexes are normal. Coordination - No Impairment of finger-to-nose or Impairment of rapid alternating movements. Gait - Normal.       Result Review :                 Assessment and Plan    Problem List Items Addressed This Visit          Symptoms and Signs    Gustatory hallucinations - Primary (Chronic)    Overview     Date of Admission: 8/15/2023   LOS: 0 days   PCP: Alex Quintanilla MD     Subjective patient feeling well this afternoon, voices no complaints.  Reports he is still experiencing gustatory  hallucinations.     Hospital Outcome:    53-year-old male admitted to the observation unit for further evaluation of intermittent forgetfulness, nasal hallucinations, dizziness, and headaches.  Patient had MRI brain with and without contrast that is negative acute.  Patient was seen by neurology, discussed with Dr. Becker.  She suspects patient is having temporal lobe/ partial complex seizures.  EEG obtained which is normal.  Patient still having gustatory hallucinations so neurology recommends increasing Keppra to 1000 mg twice daily.  Patient will follow-up with neurology.  Usual return to ER precautions discussed with patient who expresses understanding and is in agreement with plan.            Current Assessment & Plan     53 year old right handed man with possible temporal lobe seizures.  He tells me about a year and half ago he started having memory problems and some tip of the tongue phenomena.  A year ago he started having some headaches.  5-6 months ago he started getting some dizzy spells.  He started smelling things that were not there and specifically dirty ashtrays and cigarette smokes that were not there.  With the phantom smells he was concerned and was referred to the ED.  He had a head CT scan and brain MRI scan on 8/15/2023 which I reviewed the images independently on his visit today and looks good with no acute intracranial abnormalities.  He had a normal routine awake and sleep EEG at the hospital and was started on levetiracetam 1000 mg BID which he is now taking 500 mg BID and doing well other than some sleepiness.  He tells me his symptoms seem to have been getting better and fully resolved.  Before going to the ED he was having the symptom 80% of the time but now he is getting it maybe once a day and it is not as strong.  He has had significant stress over the past 18 months in his work and family life.  There is family history of headaches in his sister and he gets occasional mild headaches  for which he does not need to take any medicine and resolve spontaneously within 20 minutes.  He does not think he has ever had COVID 19 that was diagnosed.  He does report having sinus issues.  No history of head trauma.  No family history of seizures.  No history of meningitis or encephalitis.  No history of febrile seizures in childhood.  He had lab work including CMP, TSH, Vit B12, folate, ammonia and Vit D levels. At this time as he is tolerating the Keppra 500 mg BID well and not having the sensations any longer I will continue this drug.  Discussed seizure precautions.          Relevant Medications    levETIRAcetam (KEPPRA) 500 MG tablet     Other Visit Diagnoses       Temporal lobe epilepsy  (Chronic)       Relevant Medications    levETIRAcetam (KEPPRA) 500 MG tablet            Follow Up   Return in about 6 months (around 6/19/2024).  Patient was given instructions and counseling regarding his condition or for health maintenance advice. Please see specific information pulled into the AVS if appropriate.

## 2024-01-02 DIAGNOSIS — E11.9 TYPE 2 DIABETES MELLITUS WITHOUT COMPLICATION, WITHOUT LONG-TERM CURRENT USE OF INSULIN: Primary | Chronic | ICD-10-CM

## 2024-02-13 DIAGNOSIS — E78.2 MIXED HYPERLIPIDEMIA: Chronic | ICD-10-CM

## 2024-02-13 DIAGNOSIS — E79.0 ASYMPTOMATIC HYPERURICEMIA: Chronic | ICD-10-CM

## 2024-02-13 RX ORDER — ALLOPURINOL 300 MG/1
TABLET ORAL
Qty: 30 TABLET | Refills: 3 | Status: SHIPPED | OUTPATIENT
Start: 2024-02-13

## 2024-02-13 RX ORDER — EZETIMIBE AND SIMVASTATIN 10; 40 MG/1; MG/1
TABLET ORAL
Qty: 30 TABLET | Refills: 3 | Status: SHIPPED | OUTPATIENT
Start: 2024-02-13

## 2024-02-22 DIAGNOSIS — E79.0 ASYMPTOMATIC HYPERURICEMIA: Chronic | ICD-10-CM

## 2024-02-22 DIAGNOSIS — Z51.81 THERAPEUTIC DRUG MONITORING: ICD-10-CM

## 2024-02-22 DIAGNOSIS — E78.2 MIXED HYPERLIPIDEMIA: Chronic | ICD-10-CM

## 2024-02-22 DIAGNOSIS — E55.9 VITAMIN D DEFICIENCY: Chronic | ICD-10-CM

## 2024-02-22 DIAGNOSIS — I10 BENIGN ESSENTIAL HYPERTENSION: Chronic | ICD-10-CM

## 2024-02-22 DIAGNOSIS — E11.9 TYPE 2 DIABETES MELLITUS WITHOUT COMPLICATION, WITHOUT LONG-TERM CURRENT USE OF INSULIN: ICD-10-CM

## 2024-02-22 DIAGNOSIS — R74.8 ELEVATED LIVER ENZYMES: Chronic | ICD-10-CM

## 2024-02-23 LAB
25(OH)D3+25(OH)D2 SERPL-MCNC: 53.4 NG/ML (ref 30–100)
ALBUMIN SERPL-MCNC: 4.3 G/DL (ref 3.8–4.9)
ALBUMIN/CREAT UR: 157 MG/G CREAT (ref 0–29)
ALBUMIN/GLOB SERPL: 1.5 {RATIO} (ref 1.2–2.2)
ALP SERPL-CCNC: 56 IU/L (ref 44–121)
ALT SERPL-CCNC: 44 IU/L (ref 0–44)
AST SERPL-CCNC: 27 IU/L (ref 0–40)
BILIRUB SERPL-MCNC: 0.4 MG/DL (ref 0–1.2)
BUN SERPL-MCNC: 14 MG/DL (ref 6–24)
BUN/CREAT SERPL: 15 (ref 9–20)
CALCIUM SERPL-MCNC: 9 MG/DL (ref 8.7–10.2)
CHLORIDE SERPL-SCNC: 105 MMOL/L (ref 96–106)
CHOLEST SERPL-MCNC: 100 MG/DL (ref 100–199)
CK SERPL-CCNC: 93 U/L (ref 41–331)
CO2 SERPL-SCNC: 21 MMOL/L (ref 20–29)
CREAT SERPL-MCNC: 0.95 MG/DL (ref 0.76–1.27)
CREAT UR-MCNC: 170.3 MG/DL
EGFRCR SERPLBLD CKD-EPI 2021: 96 ML/MIN/1.73
ERYTHROCYTE [DISTWIDTH] IN BLOOD BY AUTOMATED COUNT: 13.2 % (ref 11.6–15.4)
GLOBULIN SER CALC-MCNC: 2.8 G/DL (ref 1.5–4.5)
GLUCOSE SERPL-MCNC: 95 MG/DL (ref 70–99)
HBA1C MFR BLD: 5.6 % (ref 4.8–5.6)
HCT VFR BLD AUTO: 47.1 % (ref 37.5–51)
HDL SERPL-SCNC: 30.4 UMOL/L
HDLC SERPL-MCNC: 33 MG/DL
HGB BLD-MCNC: 14.9 G/DL (ref 13–17.7)
LDL SERPL QN: 19.8 NM
LDL SERPL-SCNC: 467 NMOL/L
LDL SMALL SERPL-SCNC: 309 NMOL/L
LDLC SERPL CALC-MCNC: 37 MG/DL (ref 0–99)
MCH RBC QN AUTO: 28.9 PG (ref 26.6–33)
MCHC RBC AUTO-ENTMCNC: 31.6 G/DL (ref 31.5–35.7)
MCV RBC AUTO: 92 FL (ref 79–97)
MICROALBUMIN UR-MCNC: 266.8 UG/ML
PLATELET # BLD AUTO: 147 X10E3/UL (ref 150–450)
POTASSIUM SERPL-SCNC: 4.1 MMOL/L (ref 3.5–5.2)
PROT SERPL-MCNC: 7.1 G/DL (ref 6–8.5)
RBC # BLD AUTO: 5.15 X10E6/UL (ref 4.14–5.8)
SODIUM SERPL-SCNC: 145 MMOL/L (ref 134–144)
TRIGL SERPL-MCNC: 185 MG/DL (ref 0–149)
URATE SERPL-MCNC: 4.7 MG/DL (ref 3.8–8.4)
WBC # BLD AUTO: 6 X10E3/UL (ref 3.4–10.8)

## 2024-02-29 ENCOUNTER — OFFICE VISIT (OUTPATIENT)
Dept: INTERNAL MEDICINE | Facility: CLINIC | Age: 54
End: 2024-02-29
Payer: COMMERCIAL

## 2024-02-29 VITALS
RESPIRATION RATE: 16 BRPM | DIASTOLIC BLOOD PRESSURE: 78 MMHG | SYSTOLIC BLOOD PRESSURE: 132 MMHG | HEART RATE: 89 BPM | TEMPERATURE: 97.5 F | OXYGEN SATURATION: 94 % | WEIGHT: 315 LBS | HEIGHT: 73 IN | BODY MASS INDEX: 41.75 KG/M2

## 2024-02-29 DIAGNOSIS — E79.0 ASYMPTOMATIC HYPERURICEMIA: Chronic | ICD-10-CM

## 2024-02-29 DIAGNOSIS — Z51.81 THERAPEUTIC DRUG MONITORING: ICD-10-CM

## 2024-02-29 DIAGNOSIS — E55.9 VITAMIN D DEFICIENCY: Chronic | ICD-10-CM

## 2024-02-29 DIAGNOSIS — E11.9 TYPE 2 DIABETES MELLITUS WITHOUT COMPLICATION, WITHOUT LONG-TERM CURRENT USE OF INSULIN: Primary | Chronic | ICD-10-CM

## 2024-02-29 DIAGNOSIS — I10 BENIGN ESSENTIAL HYPERTENSION: Chronic | ICD-10-CM

## 2024-02-29 DIAGNOSIS — E66.01 MORBID OBESITY: Chronic | ICD-10-CM

## 2024-02-29 DIAGNOSIS — Z87.442 HISTORY OF NEPHROLITHIASIS: Chronic | ICD-10-CM

## 2024-02-29 DIAGNOSIS — G47.33 OBSTRUCTIVE SLEEP APNEA: Chronic | ICD-10-CM

## 2024-02-29 DIAGNOSIS — Z00.00 ROUTINE PHYSICAL EXAMINATION: ICD-10-CM

## 2024-02-29 DIAGNOSIS — E78.2 MIXED HYPERLIPIDEMIA: Chronic | ICD-10-CM

## 2024-02-29 DIAGNOSIS — Z91.09 MULTIPLE ENVIRONMENTAL ALLERGIES: Chronic | ICD-10-CM

## 2024-02-29 DIAGNOSIS — G40.109 TEMPORAL LOBE EPILEPSY: Chronic | ICD-10-CM

## 2024-02-29 DIAGNOSIS — G89.29 CHRONIC BILATERAL LOW BACK PAIN WITHOUT SCIATICA: Chronic | ICD-10-CM

## 2024-02-29 DIAGNOSIS — E03.8 SUBCLINICAL HYPOTHYROIDISM: Chronic | ICD-10-CM

## 2024-02-29 DIAGNOSIS — M54.50 CHRONIC BILATERAL LOW BACK PAIN WITHOUT SCIATICA: Chronic | ICD-10-CM

## 2024-02-29 DIAGNOSIS — R74.8 ELEVATED LIVER ENZYMES: Chronic | ICD-10-CM

## 2024-02-29 NOTE — PROGRESS NOTES
02/29/2024    Patient Information  Victoriano Rowe                                                                                          [id number]  [place] [id number]      1970  [unfilled]  There is no work phone number on file.    Chief Complaint:     Follow-up multiple medical problems as noted below.  No new acute complaints.    History of Present Illness:    Patient with a history of several chronic medical problems including type 2 diabetes and new diagnosis of temporal lobe epilepsy presents today for follow-up with lab prior in order to monitor his chronic medical issues noted below.  His past medical history reviewed and updated were necessary including health maintenance parameters.  This reveals he needs Prevnar 20 and Zostavax as well as diabetic eye exam.  See below.  I encouraged him to get diabetic eye exam.    Review of Systems   Constitutional: Negative.   HENT: Negative.     Eyes: Negative.    Cardiovascular: Negative.    Respiratory: Negative.     Endocrine: Negative.    Hematologic/Lymphatic: Negative.    Skin: Negative.    Musculoskeletal:  Positive for arthritis and back pain.   Gastrointestinal: Negative.    Genitourinary: Negative.    Neurological: Negative.    Psychiatric/Behavioral: Negative.     Allergic/Immunologic: Negative.        Active Problems:    Patient Active Problem List   Diagnosis    Allergic rhinitis    Asymptomatic hyperuricemia    Benign essential hypertension    Chronic bilateral low back pain without sciatica    Hyperlipidemia    Borderline Hypogonadism male    Male erectile disorder    Morbid obesity    Obstructive sleep apnea, 2008--tolerates CPAP well.    Vitamin D deficiency    History of nephrolithiasis    Therapeutic drug monitoring    Routine physical examination    Multiple environmental allergies    Elevated liver enzymes    Subclinical hypothyroidism    Diverticulosis of colon    Temporal lobe epilepsy    Type 2 diabetes  mellitus without complication, without long-term current use of insulin    Encounter for diabetic foot exam         Past Medical History:   Diagnosis Date    Allergic rhinitis 01/22/2016    Patient has never had allergy testing.  Symptoms are seasonal.    Asymptomatic hyperuricemia 08/25/2011 08/08/2015--patient seen in follow-up tolerated the lower well.  Uric acid upper limit of normal at 6.7.  Uloric increased to 80 mg per day.  06/08/2015--uric acid not at goal.  Started on Uloric 40 mg per day.  08/25/2011--patient was noted to have elevated uric acid but has never had an episode of gout.  Allopurinol was initiated in hopes that this would reduce kidney stones.    Benign essential hypertension 05/28/2008 02/16/2016--patient has lost about 60 pounds and his blood pressure is running low on half a dose of Benicar HCT 40/25.  Blood pressure medications discontinued except for metoprolol.  05/28/2008--treatment for hypertension begun.    Borderline Hypogonadism male 08/25/2011 03/21/2017--patient seen in follow-up and he feels well and does not feel any different off of TRT.  His total testosterone is borderline low at 347.  His free and weakly bound testosterone is in the normal range at 77.0.  Patient needs to stay off of testosterone replacement therapy and we will continue to monitor.  06/30/2016--patient seen for a routine physical examination.  He reports that TRT    Chronic bilateral low back pain without sciatica 06/19/2010    10/17/2014--patient presents with a five-day history of low back pain without radiation into the lower extremities.  No known trauma or injury.  He was making up the bed when this occurred.  No bowel or bladder symptoms.  Patient has a known history of herniated nucleus pulposus and bulging disks.  He has been taking some generic Aleve and this has not really helped much.  He had some leftover hyd    Elevated liver enzymes 07/27/2017 02/06/2019--routine physical.  AST  normal at 28, ALT mildly elevated at 60.  Suspect early PAGE despite normal liver ultrasound.  07/27/2017--routine physical.  AST elevated at 52, ALT elevated at 97.  Hepatitis C antibody screening and hepatitis B surface antigen ordered.  Repeat CMP ordered.  Ultrasound liver.  Hepatitis C and hepatitis B returned negative.  Repeat CMP showed improved liver enzy    History of Bell's palsy 1998    Lasted about a month.    History of nephrolithiasis 07/19/2011 08/25/2011-- allopurinol initiated for hyperuricemia.  07/19/2011--patient presented with right flank pain with radiation into the right back. CT scan of the abdomen revealed a 4 mm stone in the dependent aspect of the urinary bladder.  Likely recent passage from the right ureter where there is mild right hydronephrosis.  1.6 cm nonobstructing stone at the lower pole of the left kidney and a punctate nonobstructing stone at the upper pole of the left kidney.  Patient passed the stone spontaneously.  Calcium oxalate.    Hyperlipidemia 09/28/2008 09/28/2008--treatment for hyperlipidemia begun    Male erectile disorder 01/22/2016    Morbid obesity 01/22/2016    Multiple environmental allergies 01/22/2016    Patient has never had allergy testing.  Symptoms are seasonal.    Obstructive sleep apnea, 2008--tolerates CPAP well. 01/22/2016    Diagnosed in spring of 2008.  Tolerates CPAP well.    Subclinical hypothyroidism 02/06/2019 02/06/2019--routine physical.  TSH is slightly elevated at 4.53.  Free T3 and free T4 are normal.  Close observation.    Temporal lobe epilepsy 08/30/2023 August 30, 2023--patient seen in follow-up/transition of care by Dr. Quintanilla.  The hospital documentation reviewed including history and physical, hospital course, neurology consultation, discharge summary and discharge medications.     Date of Admission: 8/15/2023 date of discharge: August 17, 2023   LOS: 0 days   PCP: Alex Quintanilla MD     Subjective patient feeling well  this afternoon, voices    Type 2 diabetes mellitus without complication, without long-term current use of insulin 10/30/2023    October 30, 2023--routine physical.  Hemoglobin A1c elevated at 6.6.  Patient has morbid obesity and have strongly encouraged low carbohydrate diet, exercise, and weight loss.  We will reassess in 4 months with lab prior.     04/01/2011--initial diagnosis of impaired fasting glucose.  Initial hemoglobin A1c normal at 5.5.  A few months later 5.7.  Has never been significantly elevated.    Vitamin D deficiency 01/22/2016         Past Surgical History:   Procedure Laterality Date    COLONOSCOPY N/A 03/18/2022 March 18, 2022--colonoscopy revealed normal terminal ileum.  Few medium mouth diverticuli were found in the sigmoid colon.  Nonbleeding internal hemorrhoids grade 2.    KIDNEY SURGERY Left     Age [age].  Patient had some sort of swelling of his left kidney, possibly a cyst.  Surgical resection.  No further problems.         No Known Allergies        Current Outpatient Medications:     allopurinol (ZYLOPRIM) 300 MG tablet, Take 1 tablet by mouth daily for gout/elevated uric acid., Disp: 30 tablet, Rfl: 3    Aspirin Low Dose 81 MG EC tablet, Take 1 tablet by mouth daily., Disp: 90 tablet, Rfl: 0    ezetimibe-simvastatin (VYTORIN) 10-40 MG per tablet, Take 1 tablet by mouth daily for high cholesterol. Please schedule and keep appointment for further refills., Disp: 30 tablet, Rfl: 3    levETIRAcetam (KEPPRA) 500 MG tablet, Take 1 tablet by mouth 2 (Two) Times a Day., Disp: 60 tablet, Rfl: 5    metoprolol succinate XL (TOPROL-XL) 25 MG 24 hr tablet, Take 1 tablet by mouth every morning for high blood pressure., Disp: 90 tablet, Rfl: 1    sildenafil (VIAGRA) 100 MG tablet, Take 1 tablet by mouth Daily As Needed for erectile dysfunction., Disp: 5 tablet, Rfl: 5    Tirzepatide (Mounjaro) 7.5 MG/0.5ML solution pen-injector pen, Inject 7.5 mg subcutaneously weekly as directed for diabetes,  "Disp: 2 mL, Rfl: 6      Family History   Problem Relation Age of Onset    Alcohol abuse Father     Heart failure Father     Emphysema Father     Diabetes Father     Diabetes Sister          Social History     Socioeconomic History    Marital status:     Number of children: 0    Highest education level: Some college, no degree   Tobacco Use    Smoking status: Never    Smokeless tobacco: Never    Tobacco comments:     An occasional cigar, but not a regular smoker   Vaping Use    Vaping Use: Never used   Substance and Sexual Activity    Alcohol use: Yes     Alcohol/week: 2.0 standard drinks of alcohol     Types: 2 Cans of beer per week     Comment: Social alcohol consumption    Drug use: No    Sexual activity: Yes     Partners: Female     Birth control/protection: OCP         Vitals:    02/29/24 0732   BP: 132/78   Pulse: 89   Resp: 16   Temp: 97.5 °F (36.4 °C)   TempSrc: Temporal   SpO2: 94%   Weight: (!) 152 kg (335 lb)   Height: 185.4 cm (72.99\")        Body mass index is 44.21 kg/m².      Physical Exam:    General: Alert and oriented x 3.  No acute distress.  Obese.  Normal affect.  HEENT: Pupils equal, round, reactive to light; extraocular movements intact; sclerae nonicteric; pharynx, ear canals and TMs normal.  Neck: Without JVD, thyromegaly, bruit, or adenopathy.  Lungs: Clear to auscultation in all fields.  Heart: Regular rate and rhythm without murmur, rub, gallop, or click.  Abdomen: Soft, nontender, without hepatosplenomegaly or hernia.  Bowel sounds normal.  : Deferred.  Rectal: Deferred.  Extremities: Without clubbing, cyanosis, edema, or pulse deficit.  Neurologic: Intact without focal deficit.  Normal station and gait observed during ingress and egress from the examination room.  Skin: Without significant lesion.  Musculoskeletal: Unremarkable.    Lab/other results:    CBC normal except platelets slightly low 147.  CPK normal at 93.  CMP normal except sodium slightly elevated 145.  Hemoglobin " A1c 5.6.  Microalbumin/creatinine ratio elevated 157.  Uric acid normal at 4.7.  Vitamin D normal at 53.4.  Total cholesterol 100.  Triglycerides 185.  LDL particle #[id number].  HDL particle number low at 30.4.    Assessment/Plan:     Diagnosis Plan   1. Type 2 diabetes mellitus without complication, without long-term current use of insulin  Comprehensive Metabolic Panel    Hemoglobin A1c    Microalbumin / Creatinine Urine Ratio - Urine, Clean Catch    Urinalysis With Microscopic If Indicated (No Culture) - Urine, Clean Catch    Tirzepatide (Mounjaro) 7.5 MG/0.5ML solution pen-injector pen      2. Subclinical hypothyroidism  TSH    T4, Free    T3, Free      3. Hyperlipidemia  CK    Comprehensive Metabolic Panel    NMR LipoProfile      4. Elevated liver enzymes        5. Benign essential hypertension        6. Asymptomatic hyperuricemia  Uric Acid      7. Vitamin D deficiency  Vitamin D,25-Hydroxy      8. Temporal lobe epilepsy        9. History of nephrolithiasis        10. Morbid obesity        11. Multiple environmental allergies        12. Obstructive sleep apnea, 2008--tolerates CPAP well.        13. Chronic bilateral low back pain without sciatica        14. Therapeutic drug monitoring  CBC (No Diff)    PSA DIAGNOSTIC      15. Routine physical examination  CBC (No Diff)    CK    Comprehensive Metabolic Panel    Hemoglobin A1c    NMR LipoProfile    Microalbumin / Creatinine Urine Ratio - Urine, Clean Catch    TSH    T4, Free    T3, Free    PSA DIAGNOSTIC    Uric Acid    Urinalysis With Microscopic If Indicated (No Culture) - Urine, Clean Catch    Vitamin D,25-Hydroxy        Patient has type 2 diabetes is under excellent control with terzepatide.  However, he continues to have morbid obesity and we may need to consider increasing the terzepatide in order to help him facilitate weight loss.  No evidence of subclinical hypothyroidism but we will monitor a while longer.  Elevated liver enzymes have resolved.   Hyperlipidemia is under good control.  Blood pressure appears to be controlled.  Elevated uric acid is controlled with allopurinol.  Vitamin D in normal range with supplementation.  Patient saw the neurologist for complaints of gustatory hallucinations and was diagnosed with temporal lobe epilepsy and started on Keppra which she seems to be tolerating.  Patient has a history of kidney stones but none recent.  Environmental allergies currently not an issue.  He has sleep apnea and tolerates CPAP well.  Weight loss will help.  This will also help his chronic lower back pain.    Plan is as follows: Strongly recommend low carbohydrate diet, exercise, and weight loss.  Recommend Prevnar 20.  Patient should also check with his insurance company regarding coverage of Shingrix vaccine.  Strongly recommend diabetic eye exam.  Increase Mounjaro to 7.5 mg subcutaneously weekly.  Will schedule fasting lab for his annual physical after October 30, 2024.  Patient reports that he received pneumonia vaccine at work in November of last year.  He also received his first Shingrix vaccine as well as a COVID booster.  Will try to obtain that documentation.        Procedures

## 2024-04-26 DIAGNOSIS — E11.9 TYPE 2 DIABETES MELLITUS WITHOUT COMPLICATION, WITHOUT LONG-TERM CURRENT USE OF INSULIN: Primary | Chronic | ICD-10-CM

## 2024-05-08 DIAGNOSIS — I10 BENIGN ESSENTIAL HYPERTENSION: Chronic | ICD-10-CM

## 2024-05-08 RX ORDER — METOPROLOL SUCCINATE 25 MG/1
TABLET, EXTENDED RELEASE ORAL
Qty: 90 TABLET | Refills: 0 | Status: SHIPPED | OUTPATIENT
Start: 2024-05-08

## 2024-06-12 DIAGNOSIS — G40.109 TEMPORAL LOBE EPILEPSY: Chronic | ICD-10-CM

## 2024-06-12 DIAGNOSIS — E78.2 MIXED HYPERLIPIDEMIA: Chronic | ICD-10-CM

## 2024-06-12 DIAGNOSIS — R44.2 GUSTATORY HALLUCINATIONS: Chronic | ICD-10-CM

## 2024-06-12 DIAGNOSIS — E79.0 ASYMPTOMATIC HYPERURICEMIA: Chronic | ICD-10-CM

## 2024-06-12 RX ORDER — ALLOPURINOL 300 MG/1
TABLET ORAL
Qty: 30 TABLET | Refills: 2 | Status: SHIPPED | OUTPATIENT
Start: 2024-06-12 | End: 2024-06-13 | Stop reason: SDUPTHER

## 2024-06-12 RX ORDER — EZETIMIBE AND SIMVASTATIN 10; 40 MG/1; MG/1
TABLET ORAL
Qty: 30 TABLET | Refills: 2 | Status: SHIPPED | OUTPATIENT
Start: 2024-06-12 | End: 2024-06-13 | Stop reason: SDUPTHER

## 2024-06-12 RX ORDER — LEVETIRACETAM 500 MG/1
500 TABLET ORAL 2 TIMES DAILY
Qty: 60 TABLET | Refills: 0 | Status: SHIPPED | OUTPATIENT
Start: 2024-06-12 | End: 2024-06-13 | Stop reason: SDUPTHER

## 2024-06-13 DIAGNOSIS — G40.109 TEMPORAL LOBE EPILEPSY: Chronic | ICD-10-CM

## 2024-06-13 DIAGNOSIS — R44.2 GUSTATORY HALLUCINATIONS: Chronic | ICD-10-CM

## 2024-06-13 DIAGNOSIS — E79.0 ASYMPTOMATIC HYPERURICEMIA: Chronic | ICD-10-CM

## 2024-06-13 DIAGNOSIS — E78.2 MIXED HYPERLIPIDEMIA: Chronic | ICD-10-CM

## 2024-06-13 RX ORDER — EZETIMIBE AND SIMVASTATIN 10; 40 MG/1; MG/1
TABLET ORAL
Qty: 90 TABLET | Refills: 2 | Status: SHIPPED | OUTPATIENT
Start: 2024-06-13

## 2024-06-13 RX ORDER — LEVETIRACETAM 500 MG/1
500 TABLET ORAL 2 TIMES DAILY
Qty: 90 TABLET | Refills: 2 | Status: SHIPPED | OUTPATIENT
Start: 2024-06-13

## 2024-06-13 RX ORDER — ALLOPURINOL 300 MG/1
TABLET ORAL
Qty: 90 TABLET | Refills: 2 | Status: SHIPPED | OUTPATIENT
Start: 2024-06-13

## 2024-06-18 ENCOUNTER — OFFICE VISIT (OUTPATIENT)
Dept: NEUROLOGY | Facility: CLINIC | Age: 54
End: 2024-06-18
Payer: COMMERCIAL

## 2024-06-18 VITALS
SYSTOLIC BLOOD PRESSURE: 132 MMHG | BODY MASS INDEX: 41.62 KG/M2 | HEIGHT: 73 IN | HEART RATE: 85 BPM | WEIGHT: 314 LBS | OXYGEN SATURATION: 96 % | DIASTOLIC BLOOD PRESSURE: 74 MMHG

## 2024-06-18 DIAGNOSIS — G40.109 TEMPORAL LOBE EPILEPSY: Primary | Chronic | ICD-10-CM

## 2024-06-18 DIAGNOSIS — R44.2 GUSTATORY HALLUCINATIONS: Chronic | ICD-10-CM

## 2024-06-18 PROCEDURE — 99214 OFFICE O/P EST MOD 30 MIN: CPT | Performed by: PSYCHIATRY & NEUROLOGY

## 2024-06-18 RX ORDER — TIRZEPATIDE 10 MG/.5ML
INJECTION, SOLUTION SUBCUTANEOUS
COMMUNITY
Start: 2023-11-01 | End: 2024-06-18

## 2024-06-18 RX ORDER — LEVETIRACETAM 500 MG/1
500 TABLET ORAL 2 TIMES DAILY
Qty: 60 TABLET | Refills: 11 | Status: SHIPPED | OUTPATIENT
Start: 2024-06-18

## 2024-06-18 NOTE — PROGRESS NOTES
Chief Complaint  Temporal lobe epilepsy    Subjective          Victoriano Rowe presents to [facility] NEUROLOGY for   HISTORY OF PRESENT ILLNESS:    Victoriano Rowe is a 54 year old right handed man who returns to neurology clinic for follow up evaluation and treatment of temporal lobe epilepsy.  He tells me about a year and half ago he started having memory problems and some tip of the tongue phenomena.  A year ago he started having some headaches.  5-6 months ago he started getting some dizzy spells.  He started smelling things that were not there and specifically dirty ashtrays and cigarette smokes that were not there.  With the phantom smells he was concerned and was referred to the ED.  He had a head CT scan and brain MRI scan on 8/15/2023 which looks good with no acute intracranial abnormalities.  He had a normal routine awake and sleep EEG at the hospital and was started on levetiracetam 1000 mg BID which he is now taking 500 mg BID and doing well other than some sleepiness.  He tells me his symptoms seem to have been getting better and fully resolved.  Before going to the ED he was having the symptom 80% of the time but now he is getting it maybe once a day and it is not as strong.  He has had significant stress over the past 18 months in his work and family life.  There is family history of headaches in his sister and he gets occasional mild headaches for which he does not need to take any medicine and resolve spontaneously within 20 minutes.  He does not think he has ever had COVID 19 that was diagnosed.  He does report having sinus issues.  No history of head trauma.  No family history of seizures.  No history of meningitis or encephalitis.  No history of febrile seizures in childhood.         Past Medical History:   Diagnosis Date    Allergic rhinitis 01/22/2016    Patient has never had allergy testing.  Symptoms are seasonal.    Asymptomatic hyperuricemia 08/25/2011     08/08/2015--patient seen in follow-up tolerated the lower well.  Uric acid upper limit of normal at 6.7.  Uloric increased to 80 mg per day.  06/08/2015--uric acid not at goal.  Started on Uloric 40 mg per day.  08/25/2011--patient was noted to have elevated uric acid but has never had an episode of gout.  Allopurinol was initiated in hopes that this would reduce kidney stones.    Benign essential hypertension 05/28/2008 02/16/2016--patient has lost about 60 pounds and his blood pressure is running low on half a dose of Benicar HCT 40/25.  Blood pressure medications discontinued except for metoprolol.  05/28/2008--treatment for hypertension begun.    Borderline Hypogonadism male 08/25/2011 03/21/2017--patient seen in follow-up and he feels well and does not feel any different off of TRT.  His total testosterone is borderline low at 347.  His free and weakly bound testosterone is in the normal range at 77.0.  Patient needs to stay off of testosterone replacement therapy and we will continue to monitor.  06/30/2016--patient seen for a routine physical examination.  He reports that TRT    Chronic bilateral low back pain without sciatica 06/19/2010    10/17/2014--patient presents with a five-day history of low back pain without radiation into the lower extremities.  No known trauma or injury.  He was making up the bed when this occurred.  No bowel or bladder symptoms.  Patient has a known history of herniated nucleus pulposus and bulging disks.  He has been taking some generic Aleve and this has not really helped much.  He had some leftover hyd    Elevated liver enzymes 07/27/2017 02/06/2019--routine physical.  AST normal at 28, ALT mildly elevated at 60.  Suspect early PAGE despite normal liver ultrasound.  07/27/2017--routine physical.  AST elevated at 52, ALT elevated at 97.  Hepatitis C antibody screening and hepatitis B surface antigen ordered.  Repeat CMP ordered.  Ultrasound liver.  Hepatitis C and  hepatitis B returned negative.  Repeat CMP showed improved liver enzy    History of Bell's palsy 1998    Lasted about a month.    History of nephrolithiasis 07/19/2011 08/25/2011-- allopurinol initiated for hyperuricemia.  07/19/2011--patient presented with right flank pain with radiation into the right back. CT scan of the abdomen revealed a 4 mm stone in the dependent aspect of the urinary bladder.  Likely recent passage from the right ureter where there is mild right hydronephrosis.  1.6 cm nonobstructing stone at the lower pole of the left kidney and a punctate nonobstructing stone at the upper pole of the left kidney.  Patient passed the stone spontaneously.  Calcium oxalate.    Hyperlipidemia 09/28/2008 09/28/2008--treatment for hyperlipidemia begun    Male erectile disorder 01/22/2016    Morbid obesity 01/22/2016    Multiple environmental allergies 01/22/2016    Patient has never had allergy testing.  Symptoms are seasonal.    Obstructive sleep apnea, 2008--tolerates CPAP well. 01/22/2016    Diagnosed in spring of 2008.  Tolerates CPAP well.    Subclinical hypothyroidism 02/06/2019 02/06/2019--routine physical.  TSH is slightly elevated at 4.53.  Free T3 and free T4 are normal.  Close observation.    Temporal lobe epilepsy 08/30/2023 August 30, 2023--patient seen in follow-up/transition of care by Dr. Quintanilla.  The hospital documentation reviewed including history and physical, hospital course, neurology consultation, discharge summary and discharge medications.     Date of Admission: 8/15/2023 date of discharge: August 17, 2023   LOS: 0 days   PCP: Alex Quintanilla MD     Subjective patient feeling well this afternoon, voices    Type 2 diabetes mellitus without complication, without long-term current use of insulin 10/30/2023    October 30, 2023--routine physical.  Hemoglobin A1c elevated at 6.6.  Patient has morbid obesity and have strongly encouraged low carbohydrate diet, exercise, and weight  "loss.  We will reassess in 4 months with lab prior.     04/01/2011--initial diagnosis of impaired fasting glucose.  Initial hemoglobin A1c normal at 5.5.  A few months later 5.7.  Has never been significantly elevated.    Vitamin D deficiency 01/22/2016        Family History   Problem Relation Age of Onset    Alcohol abuse Father     Heart failure Father     Emphysema Father     Diabetes Father     Diabetes Sister         Social History     Socioeconomic History    Marital status:     Number of children: 0    Highest education level: Some college, no degree   Tobacco Use    Smoking status: Never    Smokeless tobacco: Never    Tobacco comments:     An occasional cigar, but not a regular smoker   Vaping Use    Vaping status: Never Used   Substance and Sexual Activity    Alcohol use: Yes     Alcohol/week: 2.0 standard drinks of alcohol     Types: 2 Cans of beer per week     Comment: Social alcohol consumption    Drug use: No    Sexual activity: Yes     Partners: Female     Birth control/protection: None        I have reviewed and confirmed the accuracy of the ROS as documented by the MA/LPN/RN Anabel Zamora MD   Review of Systems   Neurological:  Negative for dizziness, tremors, seizures, syncope, facial asymmetry, speech difficulty, weakness, light-headedness, numbness, headache, memory problem and confusion.   Psychiatric/Behavioral:  Negative for agitation, behavioral problems, decreased concentration, dysphoric mood, hallucinations, self-injury, sleep disturbance, suicidal ideas, negative for hyperactivity, depressed mood and stress. The patient is not nervous/anxious.         Objective   Vital Signs:   /74   Pulse 85   Ht 185.4 cm (72.99\")   Wt (!) 142 kg (314 lb)   SpO2 96%   BMI 41.44 kg/m²       PHYSICAL EXAM:    General   Mental Status - Alert. General Appearance - Well developed, Well groomed, Oriented and Cooperative. Orientation - Oriented X3.       Head and Neck  Head - normocephalic, " atraumatic with no lesions or palpable masses.  Neck    Global Assessment - supple.       Eye   Sclera/Conjunctiva - Bilateral - Normal.    ENMT  Mouth and Throat   Oral Cavity/Oropharynx: Oropharynx - the soft palate,uvula and tongue are normal in appearance.    Chest and Lung Exam   Chest - lung clear to auscultation bilaterally.    Cardiovascular   Cardiovascular examination reveals  - normal heart sounds, regular rate and rhythm.    Neurologic   Mental Status: Speech - Normal. Cognitive function - appropriate fund of knowledge. No impairment of attention, Impairment of concentration, impairment of long term memory or impairment of short term memory.  Cranial Nerves:   II Optic: Visual acuity - Left - Normal. Right - Normal. Visual fields - Normal (to confrontation).  III Oculomotor: Pupillary constriction - Left - Normal. Right - Normal.  VII Facial: - Normal Bilaterally.   IX Glossopharyngeal / X Vagus - Normal.  XI Accessory: Trapezius - Bilateral - Normal. Sternocleidomastoid - Bilateral - Normal.  XII Hypoglossal - Bilateral - Normal.  Eye Movements: - Normal Bilaterally.  Sensory:   Light Touch: Intact - Globally.  Motor:   Bulk and Contour: - Normal.  Tone: - Normal.  Tremor: Not present.  Strength: 5/5 normal muscle strength - All Muscles.   General Assessment of Reflexes: - deep tendon reflexes are normal. Coordination - No Impairment of finger-to-nose or Impairment of rapid alternating movements. Gait - Normal.       Result Review :                 Assessment and Plan    Problem List Items Addressed This Visit          Neuro    Temporal lobe epilepsy - Primary (Chronic)    Overview     December 19, 2023--patient was evaluated by the neurologist and felt to have temporal lobe epilepsy.  Started on Keppra 500 mg twice daily.    Date of Admission: 8/15/2023   LOS: 0 days   PCP: Alex Quintanilla MD     Subjective patient feeling well this afternoon, voices no complaints.  Reports he is still experiencing  gustatory hallucinations.     Hospital Outcome:    53-year-old male admitted to the observation unit for further evaluation of intermittent forgetfulness, nasal hallucinations, dizziness, and headaches.  Patient had MRI brain with and without contrast that is negative acute.  Patient was seen by neurology, discussed with Dr. Becker.  She suspects patient is having temporal lobe/ partial complex seizures.  EEG obtained which is normal.  Patient still having gustatory hallucinations so neurology recommends increasing Keppra to 1000 mg twice daily.  Patient will follow-up with neurology.  Usual return to ER precautions discussed with patient who expresses understanding and is in agreement with plan.            Current Assessment & Plan     54 year old right handed man with temporal lobe epilepsy.  He tells me about a year and half ago he started having memory problems and some tip of the tongue phenomena.  A year ago he started having some headaches.  5-6 months ago he started getting some dizzy spells.  He started smelling things that were not there and specifically dirty ashtrays and cigarette smokes that were not there.  With the phantom smells he was concerned and was referred to the ED.  He had a head CT scan and brain MRI scan on 8/15/2023 which looks good with no acute intracranial abnormalities.  He had a normal routine awake and sleep EEG at the hospital and was started on levetiracetam 1000 mg BID which he is now taking 500 mg BID and doing well other than some sleepiness.  He tells me his symptoms seem to have been getting better and fully resolved.  Before going to the ED he was having the symptom 80% of the time but now he is getting it maybe once a day and it is not as strong.  He has had significant stress over the past 18 months in his work and family life.  There is family history of headaches in his sister and he gets occasional mild headaches for which he does not need to take any medicine and resolve  spontaneously within 20 minutes.  He does not think he has ever had COVID 19 that was diagnosed.  He does report having sinus issues.  No history of head trauma.  No family history of seizures.  No history of meningitis or encephalitis.  No history of febrile seizures in childhood.  I will continue the levetiracetam 500 mg BID which he is tolerating well without any noticeable side effects and again advised him to take the medication as prescribed without missing any doses as epileptic seizures can be potentially fatal.  Discussed seizure precautions.  We will set up a follow up in 1 year and sooner if needed.             Relevant Medications    levETIRAcetam (KEPPRA) 500 MG tablet     Other Visit Diagnoses       Gustatory hallucinations  (Chronic)       Relevant Medications    levETIRAcetam (KEPPRA) 500 MG tablet            Follow Up   Return in about 1 year (around 6/18/2025).  Patient was given instructions and counseling regarding his condition or for health maintenance advice. Please see specific information pulled into the AVS if appropriate.

## 2024-06-18 NOTE — PATIENT INSTRUCTIONS
In general, we recommend using good judgement when you are doing certain activities and to avoid those activities that if you were to have a seizure, you could harm yourself or others. In the state of [place], it is the law that you cannot drive within 90 days of a seizure. We also recommend not standing over open flames, not getting on high ladders or the roof, not swimming or taking baths by yourself (showers are ok) and not operating heavy machinery or power tools.  [facility]  Anabel Zamora MD  Neurology clinic  [phone]    With anti-seizure medications, you may initially notice side effects of fatigue, drowsiness, unsteadiness, and dizziness.  Other possible side effects include nausea, abdominal pain, headache, blurry or double vision, slurred speech and mood changes.  Generally, patients will noticed these symptoms when the medication is first started or with higher doses and will go away with time.    It is import to consistently take your medication every day.  Missing just one dose may put you at risk for a breakthrough seizure.  Consider using reminders on your phone or a pill box.    If you develop a rash, please call the neurology clinic immediately or notify another healthcare professional, as this may be potentially life-threatening.  If you are unable to reach a healthcare professional, go to the emergency room immediately for further evaluation.    If you develop thoughts of wanting to hurt yourself or others, please call the neurology clinic immediately to notify another healthcare professional.  If you are unable to reach a healthcare professional, go to the emergency room immediately for further evaluation.    It is the [place] state law that you cannot drive within 90 days of a seizure.    You should avoid certain activities that if you were to have a seizure, you could harm yourself or others. In general, it is recommended that you avoid operating heavy machinery or  power tools, swimming or taking baths by yourself (showers are ok), don't stand over open flames, don't get on high ladders or the roof.  I also recommend to avoid sleeping on your stomach.    For further information on epilepsy and resources available to patients and their families, please visit the [facility] [place] [facility] at [email or web address] or call [phone].    **Check out the [facility] [place] [facility]'s monthly Art Group Gathering.  They [facility] [address].  Call Kina Scott at [phone] or email her at [email or web address] for the dates of future gatherings.**      **If you have having memory problems, consider HOBSCOTCH (Home-Based Self-management and Cognitive Training Changes lives).  It is an 8 week self-management program for adults with epilepsy and memory problems.  The program is free at the [facility].  Contact Kimberli Ramon at [phone] or [email or web address].**

## 2024-06-18 NOTE — ASSESSMENT & PLAN NOTE
54 year old right handed man with temporal lobe epilepsy.  He tells me about a year and half ago he started having memory problems and some tip of the tongue phenomena.  A year ago he started having some headaches.  5-6 months ago he started getting some dizzy spells.  He started smelling things that were not there and specifically dirty ashtrays and cigarette smokes that were not there.  With the phantom smells he was concerned and was referred to the ED.  He had a head CT scan and brain MRI scan on 8/15/2023 which looks good with no acute intracranial abnormalities.  He had a normal routine awake and sleep EEG at the hospital and was started on levetiracetam 1000 mg BID which he is now taking 500 mg BID and doing well other than some sleepiness.  He tells me his symptoms seem to have been getting better and fully resolved.  Before going to the ED he was having the symptom 80% of the time but now he is getting it maybe once a day and it is not as strong.  He has had significant stress over the past 18 months in his work and family life.  There is family history of headaches in his sister and he gets occasional mild headaches for which he does not need to take any medicine and resolve spontaneously within 20 minutes.  He does not think he has ever had COVID 19 that was diagnosed.  He does report having sinus issues.  No history of head trauma.  No family history of seizures.  No history of meningitis or encephalitis.  No history of febrile seizures in childhood.  I will continue the levetiracetam 500 mg BID which he is tolerating well without any noticeable side effects and again advised him to take the medication as prescribed without missing any doses as epileptic seizures can be potentially fatal.  Discussed seizure precautions.  We will set up a follow up in 1 year and sooner if needed.

## 2024-08-07 DIAGNOSIS — I10 BENIGN ESSENTIAL HYPERTENSION: Chronic | ICD-10-CM

## 2024-08-07 RX ORDER — METOPROLOL SUCCINATE 25 MG/1
TABLET, EXTENDED RELEASE ORAL
Qty: 90 TABLET | Refills: 0 | Status: SHIPPED | OUTPATIENT
Start: 2024-08-07

## 2024-10-15 DIAGNOSIS — E11.9 TYPE 2 DIABETES MELLITUS WITHOUT COMPLICATION, WITHOUT LONG-TERM CURRENT USE OF INSULIN: Chronic | ICD-10-CM

## 2024-10-25 ENCOUNTER — LAB (OUTPATIENT)
Dept: LAB | Facility: HOSPITAL | Age: 54
End: 2024-10-25
Payer: COMMERCIAL

## 2024-10-25 PROCEDURE — 84550 ASSAY OF BLOOD/URIC ACID: CPT | Performed by: INTERNAL MEDICINE

## 2024-10-25 PROCEDURE — 82570 ASSAY OF URINE CREATININE: CPT | Performed by: INTERNAL MEDICINE

## 2024-10-25 PROCEDURE — 82043 UR ALBUMIN QUANTITATIVE: CPT | Performed by: INTERNAL MEDICINE

## 2024-10-25 PROCEDURE — 84481 FREE ASSAY (FT-3): CPT | Performed by: INTERNAL MEDICINE

## 2024-10-25 PROCEDURE — 82306 VITAMIN D 25 HYDROXY: CPT | Performed by: INTERNAL MEDICINE

## 2024-10-25 PROCEDURE — 82550 ASSAY OF CK (CPK): CPT | Performed by: INTERNAL MEDICINE

## 2024-10-25 PROCEDURE — 80061 LIPID PANEL: CPT | Performed by: INTERNAL MEDICINE

## 2024-10-25 PROCEDURE — 81001 URINALYSIS AUTO W/SCOPE: CPT | Performed by: INTERNAL MEDICINE

## 2024-10-25 PROCEDURE — 83036 HEMOGLOBIN GLYCOSYLATED A1C: CPT | Performed by: INTERNAL MEDICINE

## 2024-10-25 PROCEDURE — 83704 LIPOPROTEIN BLD QUAN PART: CPT | Performed by: INTERNAL MEDICINE

## 2024-10-25 PROCEDURE — 80050 GENERAL HEALTH PANEL: CPT | Performed by: INTERNAL MEDICINE

## 2024-10-25 PROCEDURE — 84439 ASSAY OF FREE THYROXINE: CPT | Performed by: INTERNAL MEDICINE

## 2024-10-25 PROCEDURE — 84153 ASSAY OF PSA TOTAL: CPT | Performed by: INTERNAL MEDICINE

## 2024-11-04 ENCOUNTER — OFFICE VISIT (OUTPATIENT)
Dept: INTERNAL MEDICINE | Facility: CLINIC | Age: 54
End: 2024-11-04
Payer: COMMERCIAL

## 2024-11-04 VITALS
DIASTOLIC BLOOD PRESSURE: 70 MMHG | OXYGEN SATURATION: 90 % | TEMPERATURE: 98.3 F | RESPIRATION RATE: 16 BRPM | SYSTOLIC BLOOD PRESSURE: 130 MMHG | HEART RATE: 94 BPM | BODY MASS INDEX: 39.36 KG/M2 | WEIGHT: 297 LBS | HEIGHT: 73 IN

## 2024-11-04 DIAGNOSIS — G89.29 CHRONIC BILATERAL LOW BACK PAIN WITHOUT SCIATICA: Chronic | ICD-10-CM

## 2024-11-04 DIAGNOSIS — G47.33 OBSTRUCTIVE SLEEP APNEA: Chronic | ICD-10-CM

## 2024-11-04 DIAGNOSIS — Z51.81 THERAPEUTIC DRUG MONITORING: ICD-10-CM

## 2024-11-04 DIAGNOSIS — R74.8 ELEVATED LIVER ENZYMES: Chronic | ICD-10-CM

## 2024-11-04 DIAGNOSIS — M54.50 CHRONIC BILATERAL LOW BACK PAIN WITHOUT SCIATICA: Chronic | ICD-10-CM

## 2024-11-04 DIAGNOSIS — Z00.00 ROUTINE PHYSICAL EXAMINATION: Primary | ICD-10-CM

## 2024-11-04 DIAGNOSIS — Z86.16 HISTORY OF 2019 NOVEL CORONAVIRUS DISEASE (COVID-19): ICD-10-CM

## 2024-11-04 DIAGNOSIS — Z91.09 MULTIPLE ENVIRONMENTAL ALLERGIES: Chronic | ICD-10-CM

## 2024-11-04 DIAGNOSIS — I10 BENIGN ESSENTIAL HYPERTENSION: Chronic | ICD-10-CM

## 2024-11-04 DIAGNOSIS — E66.01 MORBID OBESITY: Chronic | ICD-10-CM

## 2024-11-04 DIAGNOSIS — E55.9 VITAMIN D DEFICIENCY: Chronic | ICD-10-CM

## 2024-11-04 DIAGNOSIS — G40.109 TEMPORAL LOBE EPILEPSY: Chronic | ICD-10-CM

## 2024-11-04 DIAGNOSIS — E29.1 HYPOGONADISM MALE: Chronic | ICD-10-CM

## 2024-11-04 DIAGNOSIS — J30.1 SEASONAL ALLERGIC RHINITIS DUE TO POLLEN: Chronic | ICD-10-CM

## 2024-11-04 DIAGNOSIS — Z87.442 HISTORY OF NEPHROLITHIASIS: Chronic | ICD-10-CM

## 2024-11-04 DIAGNOSIS — E11.9 ENCOUNTER FOR DIABETIC FOOT EXAM: Chronic | ICD-10-CM

## 2024-11-04 DIAGNOSIS — E03.8 SUBCLINICAL HYPOTHYROIDISM: Chronic | ICD-10-CM

## 2024-11-04 DIAGNOSIS — E78.2 MIXED HYPERLIPIDEMIA: Chronic | ICD-10-CM

## 2024-11-04 DIAGNOSIS — R80.9 URINE TEST POSITIVE FOR MICROALBUMINURIA: ICD-10-CM

## 2024-11-04 DIAGNOSIS — E11.9 TYPE 2 DIABETES MELLITUS WITHOUT COMPLICATION, WITHOUT LONG-TERM CURRENT USE OF INSULIN: Chronic | ICD-10-CM

## 2024-11-04 DIAGNOSIS — E79.0 ASYMPTOMATIC HYPERURICEMIA: Chronic | ICD-10-CM

## 2024-11-04 PROCEDURE — 99396 PREV VISIT EST AGE 40-64: CPT | Performed by: INTERNAL MEDICINE

## 2024-11-04 PROCEDURE — 99214 OFFICE O/P EST MOD 30 MIN: CPT | Performed by: INTERNAL MEDICINE

## 2024-11-04 RX ORDER — METOPROLOL SUCCINATE 25 MG/1
TABLET, EXTENDED RELEASE ORAL
Qty: 90 TABLET | Refills: 0 | Status: SHIPPED | OUTPATIENT
Start: 2024-11-04

## 2024-11-04 RX ORDER — LISINOPRIL 10 MG/1
TABLET ORAL
Qty: 30 TABLET | Refills: 11 | Status: SHIPPED | OUTPATIENT
Start: 2024-11-04

## 2024-11-04 NOTE — PROGRESS NOTES
11/04/2024    Patient Information  Victoriano Rowe                                                                                          [id number] [place] [id number]      1970  [unfilled]  There is no work phone number on file.    Chief Complaint:     Routine annual physical examination/preventative visit.  No new acute complaints.    History of Present Illness:    Patient with a history of medical problems as noted below including type 2 diabetes presents today for his routine annual physical/preventative visit.  His past medical history reviewed and updated were necessary including health maintenance parameters.  This reveals he needs a Shingrix vaccine and I suggested that he check with his insurance company regarding coverage.    Review of Systems   Constitutional: Negative.   HENT: Negative.     Eyes: Negative.    Cardiovascular: Negative.    Respiratory: Negative.     Endocrine: Negative.    Hematologic/Lymphatic: Negative.    Skin: Negative.    Musculoskeletal:  Positive for arthritis and back pain.   Gastrointestinal: Negative.    Genitourinary: Negative.    Neurological: Negative.    Psychiatric/Behavioral: Negative.     Allergic/Immunologic: Negative.        Active Problems:    Patient Active Problem List   Diagnosis    Allergic rhinitis    Asymptomatic hyperuricemia    Benign essential hypertension    Chronic bilateral low back pain without sciatica    Hyperlipidemia    Borderline Hypogonadism male    Male erectile disorder    Morbid obesity    Obstructive sleep apnea, 2008--tolerates CPAP well.    Vitamin D deficiency    History of nephrolithiasis    Therapeutic drug monitoring    Routine physical examination    Multiple environmental allergies    Elevated liver enzymes    Subclinical hypothyroidism    Diverticulosis of colon    Temporal lobe epilepsy    Type 2 diabetes mellitus without complication, without long-term current use of insulin    Encounter for diabetic  Pt informed.    Pt B/P was 127/84 today in the morning .   foot exam    History of 2019 novel coronavirus disease (COVID-19)    Urine test positive for microalbuminuria         Past Medical History:   Diagnosis Date    Allergic rhinitis 01/22/2016    Patient has never had allergy testing.  Symptoms are seasonal.    Asymptomatic hyperuricemia 08/25/2011 08/08/2015--patient seen in follow-up tolerated the lower well.  Uric acid upper limit of normal at 6.7.  Uloric increased to 80 mg per day.  06/08/2015--uric acid not at goal.  Started on Uloric 40 mg per day.  08/25/2011--patient was noted to have elevated uric acid but has never had an episode of gout.  Allopurinol was initiated in hopes that this would reduce kidney stones.    Benign essential hypertension 05/28/2008 02/16/2016--patient has lost about 60 pounds and his blood pressure is running low on half a dose of Benicar HCT 40/25.  Blood pressure medications discontinued except for metoprolol.  05/28/2008--treatment for hypertension begun.    Borderline Hypogonadism male 08/25/2011 03/21/2017--patient seen in follow-up and he feels well and does not feel any different off of TRT.  His total testosterone is borderline low at 347.  His free and weakly bound testosterone is in the normal range at 77.0.  Patient needs to stay off of testosterone replacement therapy and we will continue to monitor.  06/30/2016--patient seen for a routine physical examination.  He reports that TRT    Chronic bilateral low back pain without sciatica 06/19/2010    10/17/2014--patient presents with a five-day history of low back pain without radiation into the lower extremities.  No known trauma or injury.  He was making up the bed when this occurred.  No bowel or bladder symptoms.  Patient has a known history of herniated nucleus pulposus and bulging disks.  He has been taking some generic Aleve and this has not really helped much.  He had some leftover hyd    Elevated liver enzymes 07/27/2017 02/06/2019--routine physical.  AST  normal at 28, ALT mildly elevated at 60.  Suspect early PAGE despite normal liver ultrasound.  07/27/2017--routine physical.  AST elevated at 52, ALT elevated at 97.  Hepatitis C antibody screening and hepatitis B surface antigen ordered.  Repeat CMP ordered.  Ultrasound liver.  Hepatitis C and hepatitis B returned negative.  Repeat CMP showed improved liver enzy    History of Bell's palsy 1998    Lasted about a month.    History of nephrolithiasis 07/19/2011 08/25/2011-- allopurinol initiated for hyperuricemia.  07/19/2011--patient presented with right flank pain with radiation into the right back. CT scan of the abdomen revealed a 4 mm stone in the dependent aspect of the urinary bladder.  Likely recent passage from the right ureter where there is mild right hydronephrosis.  1.6 cm nonobstructing stone at the lower pole of the left kidney and a punctate nonobstructing stone at the upper pole of the left kidney.  Patient passed the stone spontaneously.  Calcium oxalate.    Hyperlipidemia 09/28/2008 09/28/2008--treatment for hyperlipidemia begun    Male erectile disorder 01/22/2016    Morbid obesity 01/22/2016    Multiple environmental allergies 01/22/2016    Patient has never had allergy testing.  Symptoms are seasonal.    Obstructive sleep apnea, 2008--tolerates CPAP well. 01/22/2016    Diagnosed in spring of 2008.  Tolerates CPAP well.    Subclinical hypothyroidism 02/06/2019 02/06/2019--routine physical.  TSH is slightly elevated at 4.53.  Free T3 and free T4 are normal.  Close observation.    Temporal lobe epilepsy 08/30/2023 August 30, 2023--patient seen in follow-up/transition of care by Dr. Quintanilla.  The hospital documentation reviewed including history and physical, hospital course, neurology consultation, discharge summary and discharge medications.     Date of Admission: 8/15/2023 date of discharge: August 17, 2023   LOS: 0 days   PCP: Alex Quintanilla MD     Subjective patient feeling well  this afternoon, voices    Type 2 diabetes mellitus without complication, without long-term current use of insulin 10/30/2023    October 30, 2023--routine physical.  Hemoglobin A1c elevated at 6.6.  Patient has morbid obesity and have strongly encouraged low carbohydrate diet, exercise, and weight loss.  We will reassess in 4 months with lab prior.     04/01/2011--initial diagnosis of impaired fasting glucose.  Initial hemoglobin A1c normal at 5.5.  A few months later 5.7.  Has never been significantly elevated.    Vitamin D deficiency 01/22/2016         Past Surgical History:   Procedure Laterality Date    COLONOSCOPY N/A 03/18/2022 March 18, 2022--colonoscopy revealed normal terminal ileum.  Few medium mouth diverticuli were found in the sigmoid colon.  Nonbleeding internal hemorrhoids grade 2.    KIDNEY SURGERY Left     Age [age].  Patient had some sort of swelling of his left kidney, possibly a cyst.  Surgical resection.  No further problems.         No Known Allergies        Current Outpatient Medications:     allopurinol (ZYLOPRIM) 300 MG tablet, Take 1 tablet by mouth daily for gout/elevated uric acid., Disp: 90 tablet, Rfl: 2    Aspirin Low Dose 81 MG EC tablet, Take 1 tablet by mouth daily., Disp: 90 tablet, Rfl: 0    ezetimibe-simvastatin (VYTORIN) 10-40 MG per tablet, Take 1 tablet by mouth daily for high cholesterol. Please schedule and keep appointment for further refills., Disp: 90 tablet, Rfl: 2    levETIRAcetam (KEPPRA) 500 MG tablet, Take 1 tablet by mouth 2 (Two) Times a Day., Disp: 60 tablet, Rfl: 11    metoprolol succinate XL (TOPROL-XL) 25 MG 24 hr tablet, Take 1 tablet by mouth every morning for high blood pressure., Disp: 90 tablet, Rfl: 0    sildenafil (VIAGRA) 100 MG tablet, Take 1 tablet by mouth Daily As Needed for erectile dysfunction., Disp: 5 tablet, Rfl: 5    lisinopril (PRINIVIL,ZESTRIL) 10 MG tablet, Take 1 p.o. daily for protein in urine, Disp: 30 tablet, Rfl: 11     "Tirzepatide-Weight Management (ZEPBOUND) 15 MG/0.5ML solution auto-injector, Inject 15 mg subcutaneously weekly as directed for diabetes.  This is a dose increase.  Discontinue the 10 mg dose., Disp: 2 mL, Rfl: 11      Family History   Problem Relation Age of Onset    Alcohol abuse Father     Heart failure Father     Emphysema Father     Diabetes Father     Diabetes Sister          Social History     Socioeconomic History    Marital status:     Number of children: 0    Highest education level: Some college, no degree   Tobacco Use    Smoking status: Never    Smokeless tobacco: Never    Tobacco comments:     An occasional cigar, but not a regular smoker   Vaping Use    Vaping status: Never Used   Substance and Sexual Activity    Alcohol use: Yes     Alcohol/week: 2.0 standard drinks of alcohol     Types: 2 Cans of beer per week     Comment: Social alcohol consumption    Drug use: No    Sexual activity: Yes     Partners: Female     Birth control/protection: None         Vitals:    11/04/24 0731   BP: 130/70   Pulse: 94   Resp: 16   Temp: 98.3 °F (36.8 °C)   TempSrc: Oral   SpO2: 90%   Weight: 135 kg (297 lb)   Height: 185.4 cm (72.99\")        Body mass index is 39.19 kg/m².      Physical Exam:    General: Alert and oriented x 3.  No acute distress.  Normal affect.  Obese.  HEENT: Pupils equal, round, reactive to light; extraocular movements intact; sclerae nonicteric; pharynx, ear canals and TMs normal.  Neck: Without JVD, thyromegaly, bruit, or adenopathy.  Lungs: Clear to auscultation in all fields.  Heart: Regular rate and rhythm without murmur, rub, gallop, or click.  Abdomen: Soft, nontender, without hepatosplenomegaly or hernia.  Bowel sounds normal.  : Deferred.  Rectal: Deferred.  Extremities: Without clubbing, cyanosis, edema, or pulse deficit.  Neurologic: Intact without focal deficit.  Normal station and gait observed during ingress and egress from the examination room.  Skin: Without significant " lesion.  Musculoskeletal: Unremarkable.    November 4, 2024--routine diabetic foot exam reveals no evidence of diabetic foot ulcer or preulcerative callus.  Sensation intact.  Pulses are intact.    Lab/other results:    CBC normal.  CPK normal at 59.  CMP normal except ALT 45.  Hemoglobin A1c 4.9.  Total cholesterol 102, triglycerides 171, LDL particle #430, HDL particle #30.3.  Microalbumin/creatinine ratio elevated at 125.5.  Thyroid function tests are normal.  PSA normal at 0.462.  Uric acid normal at 4.1.  Urinalysis reveals 30+ protein.  Vitamin D normal at 43.5.  Microscopic examination reveals 3-5 WBCs, 0-2 RBCs, 0-2 squamous epithelial cells and no bacteria.    Assessment/Plan:     Diagnosis Plan   1. Routine physical examination        2. Type 2 diabetes mellitus without complication, without long-term current use of insulin  Tirzepatide-Weight Management (ZEPBOUND) 15 MG/0.5ML solution auto-injector    Comprehensive Metabolic Panel    Hemoglobin A1c      3. Hyperlipidemia        4. Elevated liver enzymes        5. Benign essential hypertension        6. Asymptomatic hyperuricemia        7. Vitamin D deficiency        8. Subclinical hypothyroidism        9. Temporal lobe epilepsy        10. Multiple environmental allergies        11. History of nephrolithiasis        12. Obstructive sleep apnea, 2008--tolerates CPAP well.        13. Morbid obesity        14. Borderline Hypogonadism male        15. Chronic bilateral low back pain without sciatica        16. Allergic rhinitis        17. Encounter for diabetic foot exam        18. Therapeutic drug monitoring        19. History of 2019 novel coronavirus disease (COVID-19)  SARS-CoV-2 Antibodies, Nucleocapsid (Natural Immunity)      20. Urine test positive for microalbuminuria  lisinopril (PRINIVIL,ZESTRIL) 10 MG tablet    Microalbumin / Creatinine Urine Ratio - Urine, Clean Catch        Patient presents for his routine annual physical and has stable medical  problems listed.  His weight loss is plateaued somewhat and he is ready to increase the tirzepatide.    Several preventative health issues discussed including review of vaccinations and recommendations, including dietary issues, exercise and weight loss.  Safe sex practices discussed.  Patient advised to wear seatbelt whenever driving and avoid texting and driving.  Also advised to look both ways before crossing the street.  Colon cancer prevention discussed and is up-to-date with colonoscopy.  Advised to avoid tobacco products and minimize alcohol consumption.    Plan is as follows: Increase tirzepatide to 15 mg subcutaneous weekly.  Patient will follow-up in about 4 months to reassess.  Recommend patient check with his insurance company regarding Shingrix vaccine.  Patient indicates he has already had this.  I updated the chart.  Also patient has not had hepatitis B series.  He should check with local pharmacy regarding this.      Procedures

## 2024-11-11 DIAGNOSIS — E11.9 TYPE 2 DIABETES MELLITUS WITHOUT COMPLICATION, WITHOUT LONG-TERM CURRENT USE OF INSULIN: Primary | Chronic | ICD-10-CM

## 2024-11-12 ENCOUNTER — TELEPHONE (OUTPATIENT)
Dept: INTERNAL MEDICINE | Facility: CLINIC | Age: 54
End: 2024-11-12

## 2024-11-12 NOTE — TELEPHONE ENCOUNTER
"Caller: Victoriano Rowe \"Zach\"    Relationship: Self    Best call back number:     [phone] (Mobile)       Requested Prescriptions:   Tirzepatide 15 MG/0.5ML solution auto-injector        Pharmacy where request should be sent:      Last office visit with prescribing clinician: 11/4/2024   Last telemedicine visit with prescribing clinician: Visit date not found   Next office visit with prescribing clinician: 3/5/2025     Additional details provided by patient: THE INCREASE IN DOSAGE NEEDS A NEW PRIOR AUTHORIZATION SENT TO PHARMACY.    Does the patient have less than a 3 day supply:  [] Yes  [] No    Would you like a call back once the refill request has been completed: [] Yes [] No    If the office needs to give you a call back, can they leave a voicemail: [] Yes [] No    Vivian Vogt Rep   11/12/24 15:36 EST         THANKS     "

## 2024-11-14 ENCOUNTER — PRIOR AUTHORIZATION (OUTPATIENT)
Dept: INTERNAL MEDICINE | Facility: CLINIC | Age: 54
End: 2024-11-14
Payer: COMMERCIAL

## 2024-11-14 NOTE — TELEPHONE ENCOUNTER
Mounjaro PA done on cover my meds and approved. Pharmacy and patient informed.     CaseId:[id number];Status:Approved;Review Type:Prior Auth;Coverage Start Date:10/15/2024;Coverage End Date:11/14/2025;

## 2025-01-13 DIAGNOSIS — N52.9 MALE ERECTILE DISORDER: ICD-10-CM

## 2025-01-13 RX ORDER — SILDENAFIL 100 MG/1
100 TABLET, FILM COATED ORAL DAILY PRN
Qty: 30 TABLET | Refills: 0 | OUTPATIENT
Start: 2025-01-13

## 2025-02-02 DIAGNOSIS — I10 BENIGN ESSENTIAL HYPERTENSION: Chronic | ICD-10-CM

## 2025-02-03 RX ORDER — METOPROLOL SUCCINATE 25 MG/1
TABLET, EXTENDED RELEASE ORAL
Qty: 90 TABLET | Refills: 0 | Status: SHIPPED | OUTPATIENT
Start: 2025-02-03

## 2025-02-19 DIAGNOSIS — N52.9 MALE ERECTILE DISORDER: ICD-10-CM

## 2025-02-19 RX ORDER — SILDENAFIL 100 MG/1
100 TABLET, FILM COATED ORAL DAILY PRN
Qty: 30 TABLET | Refills: 0 | Status: SHIPPED | OUTPATIENT
Start: 2025-02-19

## 2025-02-21 ENCOUNTER — LAB (OUTPATIENT)
Dept: LAB | Facility: HOSPITAL | Age: 55
End: 2025-02-21
Payer: COMMERCIAL

## 2025-02-21 PROCEDURE — 82570 ASSAY OF URINE CREATININE: CPT | Performed by: INTERNAL MEDICINE

## 2025-02-21 PROCEDURE — 83036 HEMOGLOBIN GLYCOSYLATED A1C: CPT | Performed by: INTERNAL MEDICINE

## 2025-02-21 PROCEDURE — 80053 COMPREHEN METABOLIC PANEL: CPT | Performed by: INTERNAL MEDICINE

## 2025-02-21 PROCEDURE — 86769 SARS-COV-2 COVID-19 ANTIBODY: CPT | Performed by: INTERNAL MEDICINE

## 2025-02-21 PROCEDURE — 82043 UR ALBUMIN QUANTITATIVE: CPT | Performed by: INTERNAL MEDICINE

## 2025-03-05 ENCOUNTER — OFFICE VISIT (OUTPATIENT)
Dept: INTERNAL MEDICINE | Facility: CLINIC | Age: 55
End: 2025-03-05
Payer: COMMERCIAL

## 2025-03-05 VITALS
HEIGHT: 73 IN | HEART RATE: 80 BPM | BODY MASS INDEX: 36.98 KG/M2 | SYSTOLIC BLOOD PRESSURE: 136 MMHG | TEMPERATURE: 98.8 F | WEIGHT: 279 LBS | RESPIRATION RATE: 16 BRPM | OXYGEN SATURATION: 95 % | DIASTOLIC BLOOD PRESSURE: 80 MMHG

## 2025-03-05 DIAGNOSIS — E55.9 VITAMIN D DEFICIENCY: Chronic | ICD-10-CM

## 2025-03-05 DIAGNOSIS — Z91.09 MULTIPLE ENVIRONMENTAL ALLERGIES: Chronic | ICD-10-CM

## 2025-03-05 DIAGNOSIS — E66.01 MORBID OBESITY: Chronic | ICD-10-CM

## 2025-03-05 DIAGNOSIS — E03.8 SUBCLINICAL HYPOTHYROIDISM: Chronic | ICD-10-CM

## 2025-03-05 DIAGNOSIS — E11.29 TYPE 2 DIABETES MELLITUS WITH DIABETIC MICROALBUMINURIA, WITHOUT LONG-TERM CURRENT USE OF INSULIN: Primary | Chronic | ICD-10-CM

## 2025-03-05 DIAGNOSIS — Z51.81 THERAPEUTIC DRUG MONITORING: ICD-10-CM

## 2025-03-05 DIAGNOSIS — Z76.89 ENCOUNTER FOR WEIGHT MANAGEMENT: ICD-10-CM

## 2025-03-05 DIAGNOSIS — R80.9 TYPE 2 DIABETES MELLITUS WITH DIABETIC MICROALBUMINURIA, WITHOUT LONG-TERM CURRENT USE OF INSULIN: Primary | Chronic | ICD-10-CM

## 2025-03-05 DIAGNOSIS — R74.8 ELEVATED LIVER ENZYMES: Chronic | ICD-10-CM

## 2025-03-05 DIAGNOSIS — Z86.16 HISTORY OF 2019 NOVEL CORONAVIRUS DISEASE (COVID-19): Chronic | ICD-10-CM

## 2025-03-05 DIAGNOSIS — R80.9 MICROALBUMINURIA: Chronic | ICD-10-CM

## 2025-03-05 DIAGNOSIS — I10 BENIGN ESSENTIAL HYPERTENSION: Chronic | ICD-10-CM

## 2025-03-05 DIAGNOSIS — G40.109 TEMPORAL LOBE EPILEPSY: Chronic | ICD-10-CM

## 2025-03-05 DIAGNOSIS — Z87.442 HISTORY OF NEPHROLITHIASIS: Chronic | ICD-10-CM

## 2025-03-05 DIAGNOSIS — E78.2 MIXED HYPERLIPIDEMIA: Chronic | ICD-10-CM

## 2025-03-05 DIAGNOSIS — E79.0 ASYMPTOMATIC HYPERURICEMIA: Chronic | ICD-10-CM

## 2025-03-05 DIAGNOSIS — G47.33 OBSTRUCTIVE SLEEP APNEA: Chronic | ICD-10-CM

## 2025-03-05 NOTE — PROGRESS NOTES
03/05/2025    Patient Information  Victoriano Rowe                                                                                          [id number] [facility] [place] [phone]      1970  [unfilled]  There is no work phone number on file.    Chief Complaint:     Follow-up chronic medical problems and recent lab work.    History of Present Illness:    Patient with multiple chronic medical problems presents today for follow-up with lab prior in order to monitor his chronic medical issues.  Patient has type 2 diabetes with microalbuminuria and also has morbid obesity and we have been increasing his tirzepatide to improve his diabetes control as well as hopefully have him lose weight.  His weight is down 18 pounds!. His past medical history reviewed and updated were necessary including health maintenance parameters.  This reveals he is currently up-to-date or else accounted for.    Review of Systems   Constitutional: Negative.   HENT: Negative.     Eyes: Negative.    Cardiovascular: Negative.    Respiratory: Negative.     Endocrine: Negative.    Hematologic/Lymphatic: Negative.    Skin: Negative.    Musculoskeletal: Negative.    Gastrointestinal: Negative.    Genitourinary: Negative.    Neurological: Negative.    Psychiatric/Behavioral: Negative.     Allergic/Immunologic: Negative.        Active Problems:    Patient Active Problem List   Diagnosis    Allergic rhinitis    Asymptomatic hyperuricemia    Benign essential hypertension    Chronic bilateral low back pain without sciatica    Hyperlipidemia    Borderline Hypogonadism male    Male erectile disorder    Morbid obesity    Obstructive sleep apnea, 2008--tolerates CPAP well.    Vitamin D deficiency    History of nephrolithiasis    Therapeutic drug monitoring    Routine physical examination    Multiple environmental allergies    Elevated liver enzymes    Subclinical hypothyroidism    Diverticulosis of colon    Temporal lobe epilepsy    Type 2  diabetes mellitus with diabetic microalbuminuria, without long-term current use of insulin    Encounter for diabetic foot exam    History of 2019 novel coronavirus disease (COVID-19)    Microalbuminuria    Encounter for weight management         Past Medical History:   Diagnosis Date    Allergic     Allergic rhinitis 01/22/2016    Patient has never had allergy testing.  Symptoms are seasonal.    Asymptomatic hyperuricemia 08/25/2011 08/08/2015--patient seen in follow-up tolerated the lower well.  Uric acid upper limit of normal at 6.7.  Uloric increased to 80 mg per day.  06/08/2015--uric acid not at goal.  Started on Uloric 40 mg per day.  08/25/2011--patient was noted to have elevated uric acid but has never had an episode of gout.  Allopurinol was initiated in hopes that this would reduce kidney stones.    Benign essential hypertension 05/28/2008 02/16/2016--patient has lost about 60 pounds and his blood pressure is running low on half a dose of Benicar HCT 40/25.  Blood pressure medications discontinued except for metoprolol.  05/28/2008--treatment for hypertension begun.    Borderline Hypogonadism male 08/25/2011 03/21/2017--patient seen in follow-up and he feels well and does not feel any different off of TRT.  His total testosterone is borderline low at 347.  His free and weakly bound testosterone is in the normal range at 77.0.  Patient needs to stay off of testosterone replacement therapy and we will continue to monitor.  06/30/2016--patient seen for a routine physical examination.  He reports that TRT    Chronic bilateral low back pain without sciatica 06/19/2010    10/17/2014--patient presents with a five-day history of low back pain without radiation into the lower extremities.  No known trauma or injury.  He was making up the bed when this occurred.  No bowel or bladder symptoms.  Patient has a known history of herniated nucleus pulposus and bulging disks.  He has been taking some generic Aleve  and this has not really helped much.  He had some leftover hyd    Elevated liver enzymes 07/27/2017 02/06/2019--routine physical.  AST normal at 28, ALT mildly elevated at 60.  Suspect early PAGE despite normal liver ultrasound.  07/27/2017--routine physical.  AST elevated at 52, ALT elevated at 97.  Hepatitis C antibody screening and hepatitis B surface antigen ordered.  Repeat CMP ordered.  Ultrasound liver.  Hepatitis C and hepatitis B returned negative.  Repeat CMP showed improved liver enzy    Headache 7-1    History of 2019 novel coronavirus disease (COVID-19) 11/04/2024    Approximately June 2024--mild symptoms resolved spontaneously without residual.      History of Bell's palsy 1998    Lasted about a month.    History of nephrolithiasis 07/19/2011 08/25/2011-- allopurinol initiated for hyperuricemia.  07/19/2011--patient presented with right flank pain with radiation into the right back. CT scan of the abdomen revealed a 4 mm stone in the dependent aspect of the urinary bladder.  Likely recent passage from the right ureter where there is mild right hydronephrosis.  1.6 cm nonobstructing stone at the lower pole of the left kidney and a punctate nonobstructing stone at the upper pole of the left kidney.  Patient passed the stone spontaneously.  Calcium oxalate.    Hyperlipidemia 09/28/2008 09/28/2008--treatment for hyperlipidemia begun    Kidney stone     Male erectile disorder 01/22/2016    Microalbuminuria 11/04/2024    Morbid obesity 01/22/2016    Multiple environmental allergies 01/22/2016    Patient has never had allergy testing.  Symptoms are seasonal.    Obstructive sleep apnea, 2008--tolerates CPAP well. 01/22/2016    Diagnosed in spring of 2008.  Tolerates CPAP well.    Subclinical hypothyroidism 02/06/2019 02/06/2019--routine physical.  TSH is slightly elevated at 4.53.  Free T3 and free T4 are normal.  Close observation.    Temporal lobe epilepsy 08/30/2023 August 30, 2023--patient  seen in follow-up/transition of care by Dr. Quintanilla.  The hospital documentation reviewed including history and physical, hospital course, neurology consultation, discharge summary and discharge medications.     Date of Admission: 8/15/2023 date of discharge: August 17, 2023   LOS: 0 days   PCP: Alex Quintanilla MD     Subjective patient feeling well this afternoon, voices    Type 2 diabetes mellitus with diabetic microalbuminuria, without long-term current use of insulin 10/30/2023    October 30, 2023--routine physical.  Hemoglobin A1c elevated at 6.6.  Patient has morbid obesity and have strongly encouraged low carbohydrate diet, exercise, and weight loss.  We will reassess in 4 months with lab prior.     04/01/2011--initial diagnosis of impaired fasting glucose.  Initial hemoglobin A1c normal at 5.5.  A few months later 5.7.  Has never been significantly elevated.      Vitamin D deficiency 01/22/2016         Past Surgical History:   Procedure Laterality Date    COLONOSCOPY N/A 03/18/2022 March 18, 2022--colonoscopy revealed normal terminal ileum.  Few medium mouth diverticuli were found in the sigmoid colon.  Nonbleeding internal hemorrhoids grade 2.    KIDNEY SURGERY Left     Age [age].  Patient had some sort of swelling of his left kidney, possibly a cyst.  Surgical resection.  No further problems.         No Known Allergies        Current Outpatient Medications:     allopurinol (ZYLOPRIM) 300 MG tablet, Take 1 tablet by mouth daily for gout/elevated uric acid., Disp: 90 tablet, Rfl: 2    Aspirin Low Dose 81 MG EC tablet, Take 1 tablet by mouth daily., Disp: 90 tablet, Rfl: 0    ezetimibe-simvastatin (VYTORIN) 10-40 MG per tablet, Take 1 tablet by mouth daily for high cholesterol. Please schedule and keep appointment for further refills., Disp: 90 tablet, Rfl: 2    levETIRAcetam (KEPPRA) 500 MG tablet, Take 1 tablet by mouth 2 (Two) Times a Day., Disp: 60 tablet, Rfl: 11    lisinopril (PRINIVIL,ZESTRIL) 10  "MG tablet, Take 1 p.o. daily for protein in urine, Disp: 30 tablet, Rfl: 11    metoprolol succinate XL (TOPROL-XL) 25 MG 24 hr tablet, Take 1 tablet by mouth every morning for high blood pressure., Disp: 90 tablet, Rfl: 0    sildenafil (Viagra) 100 MG tablet, Take 1 tablet by mouth Daily As Needed for Erectile Dysfunction., Disp: 30 tablet, Rfl: 0    Tirzepatide 15 MG/0.5ML solution auto-injector, Inject 15 mg under the skin into the appropriate area as directed 1 (One) Time Per Week., Disp: 2 mL, Rfl: 11      Family History   Problem Relation Age of Onset    Alcohol abuse Father     Heart failure Father     Emphysema Father     Diabetes Father     Diabetes Sister     Cancer Mother         Diagnosed 11/22 age 82    Diabetes Paternal Grandfather          Social History     Socioeconomic History    Marital status:     Number of children: 0    Highest education level: Some college, no degree   Tobacco Use    Smoking status: Never    Smokeless tobacco: Never    Tobacco comments:     An occasional cigar, but not a regular smoker   Vaping Use    Vaping status: Never Used   Substance and Sexual Activity    Alcohol use: Yes     Alcohol/week: 2.0 standard drinks of alcohol     Types: 2 Cans of beer per week     Comment: Social alcohol consumption    Drug use: No    Sexual activity: Yes     Partners: Female     Birth control/protection: None         Vitals:    03/05/25 0731   BP: 136/80   Pulse: 80   Resp: 16   Temp: 98.8 °F (37.1 °C)   TempSrc: Oral   SpO2: 95%   Weight: 127 kg (279 lb)   Height: 185.4 cm (72.99\")        Body mass index is 36.82 kg/m².      Physical Exam:    General: Alert and oriented x 3.  No acute distress.  Normal affect.  Obese.  HEENT: Pupils equal, round, reactive to light; extraocular movements intact; sclerae nonicteric; pharynx, ear canals and TMs normal.  Neck: Without JVD, thyromegaly, bruit, or adenopathy.  Lungs: Clear to auscultation in all fields.  Heart: Regular rate and rhythm " without murmur, rub, gallop, or click.  Abdomen: Soft, nontender, without hepatosplenomegaly or hernia.  Bowel sounds normal.  : Deferred.  Rectal: Deferred.  Extremities: Without clubbing, cyanosis, edema, or pulse deficit.  Neurologic: Intact without focal deficit.  Normal station and gait observed during ingress and egress from the examination room.  Skin: Without significant lesion.  Musculoskeletal: Unremarkable.    Lab/other results:    SARS antibodies are positive.  CMP is normal except ALT slightly elevated 44.  AST is normal.  Hemoglobin A1c 5.0.  Microalbumin/creatinine ratio elevated at 69.5.    Assessment/Plan:     Diagnosis Plan   1. Type 2 diabetes mellitus with diabetic microalbuminuria, without long-term current use of insulin  Comprehensive Metabolic Panel    Hemoglobin A1c      2. Microalbuminuria  Microalbumin / Creatinine Urine Ratio - Urine, Clean Catch      3. Morbid obesity        4. Encounter for weight management        5. Hyperlipidemia  CK    Comprehensive Metabolic Panel    NMR LipoProfile      6. Elevated liver enzymes  Comprehensive Metabolic Panel      7. Benign essential hypertension  Comprehensive Metabolic Panel      8. Subclinical hypothyroidism  TSH    T4, Free    T3, Free      9. Vitamin D deficiency  Vitamin D,25-Hydroxy      10. History of 2019 novel coronavirus disease (COVID-19)  SARS-CoV-2 Antibodies, Nucleocapsid (Natural Immunity)      11. Asymptomatic hyperuricemia  Uric Acid      12. History of nephrolithiasis  Uric Acid    Urinalysis With Microscopic If Indicated (No Culture) - Urine, Clean Catch      13. Obstructive sleep apnea, 2008--tolerates CPAP well.        14. Multiple environmental allergies        15. Temporal lobe epilepsy        16. Therapeutic drug monitoring  CBC (No Diff)        Patient seen in follow-up for chronic medical problems as noted above.  We started him on tirzepatide and recently increase that to 15 mg subcutaneously weekly.  This is improved  his diabetes control and also his weight is down 18 pounds.  He has a history of elevated liver enzymes and that should improve with glucose control improvement.  Mild elevation only.  Hypertensions under good control.  We have been monitoring subclinical hypothyroidism and thus far no evidence of overt hypothyroidism.  Blood pressure seems to be well-controlled with the metoprolol and lisinopril which also could help with kidney protection.  Cholesterols been great with Vytorin.  He is on allopurinol for history of kidney stones and hyperuricemia.  He has sleep apnea which may improve with weight loss as well.  Epilepsy is stable.    Plan is as follows: Continue with low-carb diet and weight loss efforts.  I see no reason to make any changes in current medical regimen at this time.  I will have him follow-up in about 4 months with lab prior to reassess.  Otherwise he will follow-up as needed.      Procedures

## 2025-05-03 DIAGNOSIS — I10 BENIGN ESSENTIAL HYPERTENSION: Chronic | ICD-10-CM

## 2025-05-03 DIAGNOSIS — E78.2 MIXED HYPERLIPIDEMIA: Chronic | ICD-10-CM

## 2025-05-03 DIAGNOSIS — E79.0 ASYMPTOMATIC HYPERURICEMIA: Chronic | ICD-10-CM

## 2025-05-06 RX ORDER — EZETIMIBE AND SIMVASTATIN 10; 40 MG/1; MG/1
TABLET ORAL
Qty: 90 TABLET | Refills: 3 | Status: SHIPPED | OUTPATIENT
Start: 2025-05-06

## 2025-05-06 RX ORDER — METOPROLOL SUCCINATE 25 MG/1
25 TABLET, EXTENDED RELEASE ORAL EVERY MORNING
Qty: 90 TABLET | Refills: 3 | Status: SHIPPED | OUTPATIENT
Start: 2025-05-06

## 2025-05-06 RX ORDER — ALLOPURINOL 300 MG/1
TABLET ORAL
Qty: 90 TABLET | Refills: 3 | Status: SHIPPED | OUTPATIENT
Start: 2025-05-06

## 2025-06-02 DIAGNOSIS — R44.2 GUSTATORY HALLUCINATIONS: Chronic | ICD-10-CM

## 2025-06-02 DIAGNOSIS — G40.109 TEMPORAL LOBE EPILEPSY: Chronic | ICD-10-CM

## 2025-06-02 RX ORDER — LEVETIRACETAM 500 MG/1
500 TABLET ORAL 2 TIMES DAILY
Qty: 60 TABLET | Refills: 0 | Status: SHIPPED | OUTPATIENT
Start: 2025-06-02

## 2025-07-09 ENCOUNTER — OFFICE VISIT (OUTPATIENT)
Dept: INTERNAL MEDICINE | Facility: CLINIC | Age: 55
End: 2025-07-09
Payer: COMMERCIAL

## 2025-07-09 VITALS
WEIGHT: 270 LBS | HEART RATE: 83 BPM | BODY MASS INDEX: 35.78 KG/M2 | TEMPERATURE: 98.3 F | OXYGEN SATURATION: 95 % | RESPIRATION RATE: 16 BRPM | HEIGHT: 73 IN | SYSTOLIC BLOOD PRESSURE: 132 MMHG | DIASTOLIC BLOOD PRESSURE: 82 MMHG

## 2025-07-09 DIAGNOSIS — Z00.00 ROUTINE PHYSICAL EXAMINATION: ICD-10-CM

## 2025-07-09 DIAGNOSIS — Z51.81 THERAPEUTIC DRUG MONITORING: ICD-10-CM

## 2025-07-09 DIAGNOSIS — E78.2 MIXED HYPERLIPIDEMIA: Chronic | ICD-10-CM

## 2025-07-09 DIAGNOSIS — G40.109 TEMPORAL LOBE EPILEPSY: Chronic | ICD-10-CM

## 2025-07-09 DIAGNOSIS — Z91.09 MULTIPLE ENVIRONMENTAL ALLERGIES: Chronic | ICD-10-CM

## 2025-07-09 DIAGNOSIS — M54.50 CHRONIC BILATERAL LOW BACK PAIN WITHOUT SCIATICA: Chronic | ICD-10-CM

## 2025-07-09 DIAGNOSIS — E55.9 VITAMIN D DEFICIENCY: Chronic | ICD-10-CM

## 2025-07-09 DIAGNOSIS — E66.01 MORBID OBESITY: Chronic | ICD-10-CM

## 2025-07-09 DIAGNOSIS — G47.33 OBSTRUCTIVE SLEEP APNEA: Chronic | ICD-10-CM

## 2025-07-09 DIAGNOSIS — R80.9 TYPE 2 DIABETES MELLITUS WITH DIABETIC MICROALBUMINURIA, WITHOUT LONG-TERM CURRENT USE OF INSULIN: Primary | Chronic | ICD-10-CM

## 2025-07-09 DIAGNOSIS — R80.9 MICROALBUMINURIA: Chronic | ICD-10-CM

## 2025-07-09 DIAGNOSIS — Z76.89 ENCOUNTER FOR WEIGHT MANAGEMENT: ICD-10-CM

## 2025-07-09 DIAGNOSIS — G89.29 CHRONIC BILATERAL LOW BACK PAIN WITHOUT SCIATICA: Chronic | ICD-10-CM

## 2025-07-09 DIAGNOSIS — E11.29 TYPE 2 DIABETES MELLITUS WITH DIABETIC MICROALBUMINURIA, WITHOUT LONG-TERM CURRENT USE OF INSULIN: Primary | Chronic | ICD-10-CM

## 2025-07-09 DIAGNOSIS — Z87.442 HISTORY OF NEPHROLITHIASIS: Chronic | ICD-10-CM

## 2025-07-09 DIAGNOSIS — J30.1 SEASONAL ALLERGIC RHINITIS DUE TO POLLEN: Chronic | ICD-10-CM

## 2025-07-09 DIAGNOSIS — R74.8 ELEVATED LIVER ENZYMES: Chronic | ICD-10-CM

## 2025-07-09 DIAGNOSIS — E03.8 SUBCLINICAL HYPOTHYROIDISM: Chronic | ICD-10-CM

## 2025-07-09 DIAGNOSIS — E79.0 ASYMPTOMATIC HYPERURICEMIA: Chronic | ICD-10-CM

## 2025-07-09 DIAGNOSIS — I10 BENIGN ESSENTIAL HYPERTENSION: Chronic | ICD-10-CM

## 2025-07-09 DIAGNOSIS — Z86.16 HISTORY OF 2019 NOVEL CORONAVIRUS DISEASE (COVID-19): Chronic | ICD-10-CM

## 2025-07-09 PROCEDURE — 99214 OFFICE O/P EST MOD 30 MIN: CPT | Performed by: INTERNAL MEDICINE

## 2025-07-09 RX ORDER — LISINOPRIL 20 MG/1
TABLET ORAL
Qty: 30 TABLET | Refills: 11 | Status: SHIPPED | OUTPATIENT
Start: 2025-07-09

## 2025-07-09 NOTE — PROGRESS NOTES
07/09/2025    Patient Information  Victoriano Rowe                                                                                          [id number] [address] [place] [id number]      1970  [unfilled]  There is no work phone number on file.    Chief Complaint:     Follow-up chronic medical problems.  Recent blood work.  No new acute complaints.    History of Present Illness:    Patient with multiple chronic medical problems as noted below in assessment and plan presents today for follow-up with lab prior in order to monitor his chronic medical issues.  Patient has type 2 diabetes and is on tirzepatide and since I saw him about 3 months ago his weight is down about 9 pounds.  He is making good progression on his morbid obesity.  Obesity is still considered to be morbid given his underlying medical problems noted below which should definitely improve with continued weight loss.  Past medical history reviewed and updated were necessary including health maintenance parameters.  This reveals he needs hepatitis B vaccine.  I will check and see if this is available in the office.  If not have recommended he go to the local drugstore.  Also it appears patient needs diabetic eye exam which I encouraged him to get.    Review of Systems   Constitutional: Negative.   HENT: Negative.     Eyes: Negative.    Cardiovascular: Negative.    Respiratory: Negative.     Endocrine: Negative.    Hematologic/Lymphatic: Negative.    Skin: Negative.    Musculoskeletal:  Positive for arthritis and back pain.   Gastrointestinal: Negative.    Genitourinary: Negative.    Neurological: Negative.    Psychiatric/Behavioral: Negative.     Allergic/Immunologic: Negative.        Active Problems:    Patient Active Problem List   Diagnosis    Allergic rhinitis    Asymptomatic hyperuricemia    Benign essential hypertension    Chronic bilateral low back pain without sciatica    Hyperlipidemia    Borderline Hypogonadism male    Male  erectile disorder    Morbid obesity    Obstructive sleep apnea, 2008--tolerates CPAP well.    Vitamin D deficiency    History of nephrolithiasis    Therapeutic drug monitoring    Routine physical examination    Multiple environmental allergies    Elevated liver enzymes    Subclinical hypothyroidism    Diverticulosis of colon    Temporal lobe epilepsy    Type 2 diabetes mellitus with diabetic microalbuminuria, without long-term current use of insulin    Encounter for diabetic foot exam    History of 2019 novel coronavirus disease (COVID-19)    Microalbuminuria    Encounter for weight management         Past Medical History:   Diagnosis Date    Allergic rhinitis 01/22/2016    Patient has never had allergy testing.  Symptoms are seasonal.    Asymptomatic hyperuricemia 08/25/2011 08/08/2015--patient seen in follow-up tolerated the lower well.  Uric acid upper limit of normal at 6.7.  Uloric increased to 80 mg per day.  06/08/2015--uric acid not at goal.  Started on Uloric 40 mg per day.  08/25/2011--patient was noted to have elevated uric acid but has never had an episode of gout.  Allopurinol was initiated in hopes that this would reduce kidney stones.    Benign essential hypertension 05/28/2008 02/16/2016--patient has lost about 60 pounds and his blood pressure is running low on half a dose of Benicar HCT 40/25.  Blood pressure medications discontinued except for metoprolol.  05/28/2008--treatment for hypertension begun.    Borderline Hypogonadism male 08/25/2011 03/21/2017--patient seen in follow-up and he feels well and does not feel any different off of TRT.  His total testosterone is borderline low at 347.  His free and weakly bound testosterone is in the normal range at 77.0.  Patient needs to stay off of testosterone replacement therapy and we will continue to monitor.  06/30/2016--patient seen for a routine physical examination.  He reports that TRT    Chronic bilateral low back pain without sciatica  06/19/2010    10/17/2014--patient presents with a five-day history of low back pain without radiation into the lower extremities.  No known trauma or injury.  He was making up the bed when this occurred.  No bowel or bladder symptoms.  Patient has a known history of herniated nucleus pulposus and bulging disks.  He has been taking some generic Aleve and this has not really helped much.  He had some leftover hyd    Elevated liver enzymes 07/27/2017 02/06/2019--routine physical.  AST normal at 28, ALT mildly elevated at 60.  Suspect early PAGE despite normal liver ultrasound.  07/27/2017--routine physical.  AST elevated at 52, ALT elevated at 97.  Hepatitis C antibody screening and hepatitis B surface antigen ordered.  Repeat CMP ordered.  Ultrasound liver.  Hepatitis C and hepatitis B returned negative.  Repeat CMP showed improved liver enzy    History of 2019 novel coronavirus disease (COVID-19) 11/04/2024    Approximately June 2024--mild symptoms resolved spontaneously without residual.      History of nephrolithiasis 07/19/2011 08/25/2011-- allopurinol initiated for hyperuricemia.  07/19/2011--patient presented with right flank pain with radiation into the right back. CT scan of the abdomen revealed a 4 mm stone in the dependent aspect of the urinary bladder.  Likely recent passage from the right ureter where there is mild right hydronephrosis.  1.6 cm nonobstructing stone at the lower pole of the left kidney and a punctate nonobstructing stone at the upper pole of the left kidney.  Patient passed the stone spontaneously.  Calcium oxalate.    Hyperlipidemia 09/28/2008 09/28/2008--treatment for hyperlipidemia begun    Male erectile disorder 01/22/2016    Microalbuminuria 11/04/2024    Morbid obesity 01/22/2016    Multiple environmental allergies 01/22/2016    Patient has never had allergy testing.  Symptoms are seasonal.    Obstructive sleep apnea, 2008--tolerates CPAP well. 01/22/2016    Diagnosed in  spring of 2008.  Tolerates CPAP well.    Subclinical hypothyroidism 02/06/2019 02/06/2019--routine physical.  TSH is slightly elevated at 4.53.  Free T3 and free T4 are normal.  Close observation.    Temporal lobe epilepsy 08/30/2023 August 30, 2023--patient seen in follow-up/transition of care by Dr. Quintanilla.  The hospital documentation reviewed including history and physical, hospital course, neurology consultation, discharge summary and discharge medications.     Date of Admission: 8/15/2023 date of discharge: August 17, 2023   LOS: 0 days   PCP: Alex Quintanilla MD     Subjective patient feeling well this afternoon, voices    Type 2 diabetes mellitus with diabetic microalbuminuria, without long-term current use of insulin 10/30/2023    October 30, 2023--routine physical.  Hemoglobin A1c elevated at 6.6.  Patient has morbid obesity and have strongly encouraged low carbohydrate diet, exercise, and weight loss.  We will reassess in 4 months with lab prior.     04/01/2011--initial diagnosis of impaired fasting glucose.  Initial hemoglobin A1c normal at 5.5.  A few months later 5.7.  Has never been significantly elevated.      Vitamin D deficiency 01/22/2016         Past Surgical History:   Procedure Laterality Date    COLONOSCOPY N/A 03/18/2022 March 18, 2022--colonoscopy revealed normal terminal ileum.  Few medium mouth diverticuli were found in the sigmoid colon.  Nonbleeding internal hemorrhoids grade 2.    KIDNEY SURGERY Left     Age [age].  Patient had some sort of swelling of his left kidney, possibly a cyst.  Surgical resection.  No further problems.         No Known Allergies        Current Outpatient Medications:     allopurinol (ZYLOPRIM) 300 MG tablet, Take 1 tablet by mouth daily for gout/elevated uric acid., Disp: 90 tablet, Rfl: 3    Aspirin Low Dose 81 MG EC tablet, Take 1 tablet by mouth daily., Disp: 90 tablet, Rfl: 0    ezetimibe-simvastatin (VYTORIN) 10-40 MG per tablet, Take 1 tablet by  "mouth daily for high cholesterol. Please schedule and keep appointment for further refills., Disp: 90 tablet, Rfl: 3    levETIRAcetam (KEPPRA) 500 MG tablet, Take 1 tablet by mouth twice daily., Disp: 60 tablet, Rfl: 0    metoprolol succinate XL (TOPROL-XL) 25 MG 24 hr tablet, Take 1 tablet by mouth every morning for high blood pressure., Disp: 90 tablet, Rfl: 3    sildenafil (Viagra) 100 MG tablet, Take 1 tablet by mouth Daily As Needed for Erectile Dysfunction., Disp: 30 tablet, Rfl: 0    Tirzepatide 15 MG/0.5ML solution auto-injector, Inject 15 mg under the skin into the appropriate area as directed 1 (One) Time Per Week., Disp: 2 mL, Rfl: 11    lisinopril (PRINIVIL,ZESTRIL) 20 MG tablet, Take 1 tablet (20 mg) every day for blood pressure and protein in the urine., Disp: 30 tablet, Rfl: 11      Family History   Problem Relation Age of Onset    Alcohol abuse Father     Heart failure Father     Emphysema Father     Diabetes Father     Diabetes Sister     Cancer Mother         Diagnosed 11/22 age 82    Diabetes Paternal Grandfather          Social History     Socioeconomic History    Marital status:     Number of children: 0    Highest education level: Some college, no degree   Tobacco Use    Smoking status: Never    Smokeless tobacco: Never    Tobacco comments:     An occasional cigar, but not a regular smoker   Vaping Use    Vaping status: Never Used   Substance and Sexual Activity    Alcohol use: Yes     Alcohol/week: 2.0 standard drinks of alcohol     Types: 2 Cans of beer per week     Comment: Social alcohol consumption    Drug use: No    Sexual activity: Yes     Partners: Female     Birth control/protection: None         Vitals:    07/09/25 0908   BP: 132/82   Pulse: 83   Resp: 16   Temp: 98.3 °F (36.8 °C)   TempSrc: Oral   SpO2: 95%   Weight: 122 kg (270 lb)   Height: 185.4 cm (72.99\")        Body mass index is 35.63 kg/m².      Physical Exam:    General: Alert and oriented x 3.  No acute distress.  " Obese.  Normal affect.  HEENT: Pupils equal, round, reactive to light; extraocular movements intact; sclerae nonicteric; pharynx, ear canals and TMs normal.  Neck: Without JVD, thyromegaly, bruit, or adenopathy.  Lungs: Clear to auscultation in all fields.  Heart: Regular rate and rhythm without murmur, rub, gallop, or click.  Abdomen: Soft, nontender, without hepatosplenomegaly or hernia.  Bowel sounds normal.  : Deferred.  Rectal: Deferred.  Extremities: Without clubbing, cyanosis, edema, or pulse deficit.  Neurologic: Intact without focal deficit.  Normal station and gait observed during ingress and egress from the examination room.  Skin: Without significant lesion.  Musculoskeletal: Unremarkable.    Lab/other results:    SARS antibodies are positive.  CBC is normal.  CK normal at 79.  CMP entirely normal.  Hemoglobin A1c normal at 5.3.  Microalbumin/creatinine ratio elevated at 44.  Total cholesterol 98, triglycerides 132, LDL particle #390, HDL particle #33.  Thyroid function tests are normal.  Uric acid normal at 3.9.  Vitamin D normal at 44.1.  Urinalysis normal.    Assessment/Plan:     Diagnosis Plan   1. Type 2 diabetes mellitus with diabetic microalbuminuria, without long-term current use of insulin  Comprehensive Metabolic Panel    Hemoglobin A1c    Microalbumin / Creatinine Urine Ratio - Urine, Clean Catch      2. Microalbuminuria  Microalbumin / Creatinine Urine Ratio - Urine, Clean Catch    lisinopril (PRINIVIL,ZESTRIL) 20 MG tablet      3. Encounter for weight management        4. Morbid obesity        5. Hyperlipidemia  CK    Comprehensive Metabolic Panel    NMR LipoProfile      6. Asymptomatic hyperuricemia        7. Benign essential hypertension  Comprehensive Metabolic Panel    lisinopril (PRINIVIL,ZESTRIL) 20 MG tablet      8. Vitamin D deficiency  Vitamin D,25-Hydroxy      9. Elevated liver enzymes  Comprehensive Metabolic Panel      10. Subclinical hypothyroidism  TSH    T4, Free    T3, Free       11. Temporal lobe epilepsy        12. Chronic bilateral low back pain without sciatica        13. Obstructive sleep apnea, 2008--tolerates CPAP well.  Uric Acid      14. History of nephrolithiasis        15. Multiple environmental allergies        16. Allergic rhinitis        17. History of 2019 novel coronavirus disease (COVID-19)        18. Therapeutic drug monitoring  CBC (No Diff)      19. Routine physical examination  CBC (No Diff)    CK    Comprehensive Metabolic Panel    Hemoglobin A1c    NMR LipoProfile    Microalbumin / Creatinine Urine Ratio - Urine, Clean Catch    TSH    T4, Free    T3, Free    PSA DIAGNOSTIC    Urinalysis With Microscopic If Indicated (No Culture) - Urine, Clean Catch    Uric Acid    Vitamin D,25-Hydroxy        Patient has type 2 diabetes that is under excellent control with tirzepatide 15 mg subcutaneously weekly.  His A1c is normalized.  He continues to lose weight which is important given his other medical problems that should improve with continued weight loss.  This includes low back pain which already is somewhat better.  He has hyperlipidemia and is on Vytorin tolerating that well and has an excellent NMR profile.  Uric acid is under great control with allopurinol.  Blood pressure is well-controlled with lisinopril and metoprolol.  Patient does have microalbuminuria and would benefit from increased lisinopril.  Blood pressure is little borderline diastolically.  He also takes metoprolol.  There is no evidence of subclinical hypothyroidism and we will continue to monitor this.  His liver enzymes related to fatty liver and this is definitely improved as well.  Patient has sleep apnea and tolerates CPAP well.  He has a history of kidney stones but none recent.  He also has a history of seizure disorder that is well-controlled with Keppra.  He has environmental allergies/allergic rhinitis currently seem to be controlled.    Plan is as follows: Increase lisinopril to 20 mg/day.   No other changes in medical regimen.  Patient will continue with low-carb diet, exercise, and weight loss.  Will schedule him for his annual physical exam on or after November 4, 2025 with lab prior.  Otherwise he will follow-up as needed.  Recommend diabetic eye exam.  Also recommend hepatitis B vaccine.  Patient wants to check on the hepatitis B vaccine.  He may have already had it.  We can discuss at his physical.        Procedures

## 2025-07-22 DIAGNOSIS — G40.109 TEMPORAL LOBE EPILEPSY: Chronic | ICD-10-CM

## 2025-07-22 DIAGNOSIS — R44.2 GUSTATORY HALLUCINATIONS: Chronic | ICD-10-CM

## 2025-07-22 RX ORDER — LEVETIRACETAM 500 MG/1
500 TABLET ORAL 2 TIMES DAILY
Qty: 60 TABLET | Refills: 0 | Status: SHIPPED | OUTPATIENT
Start: 2025-07-22

## 2025-07-22 NOTE — TELEPHONE ENCOUNTER
"Caller: JaeVictoriano \"Zach\"    Relationship: Self    Best call back number: [phone]    Requested Prescriptions:   Requested Prescriptions     Pending Prescriptions Disp Refills    levETIRAcetam (KEPPRA) 500 MG tablet 60 tablet 0     Sig: Take 1 tablet by mouth 2 (Two) Times a Day.      [facility] [address] [phone] [facility] [phone]      Last office visit with prescribing clinician: 6/18/2024   Last telemedicine visit with prescribing clinician: Visit date not found   Next office visit with prescribing clinician: 8/28/2025     Additional details provided by patient: PT STATES [facility] HAS ALSO SENT A REFILL REQUEST, HOWEVER, I DO NOT SEE THIS IS PT'S CHART, THEREFORE, I ADVISED PT THAT I WOULD BEGIN REQUEST. PT VERBALIZED UNDERSTANDING AND WILL AWAIT PROVIDER APPROVAL.    Does the patient have less than a 3 day supply?:  [] Yes  [x] No    Would you like a call back once the refill request has been completed?: [] Yes  [x] No    If the office needs to give you a call back, can they leave a voicemail?: [] Yes  [x] No    PLEASE REVIEW AND ADVISE.    Vivian Tse Rep   07/22/25 08:26 EDT  "

## 2025-08-01 DIAGNOSIS — R44.2 GUSTATORY HALLUCINATIONS: Chronic | ICD-10-CM

## 2025-08-01 DIAGNOSIS — G40.109 TEMPORAL LOBE EPILEPSY: Chronic | ICD-10-CM

## 2025-08-01 RX ORDER — LEVETIRACETAM 500 MG/1
500 TABLET ORAL 2 TIMES DAILY
Qty: 60 TABLET | Refills: 0 | OUTPATIENT
Start: 2025-08-01

## 2025-08-11 DIAGNOSIS — G40.109 TEMPORAL LOBE EPILEPSY: Chronic | ICD-10-CM

## 2025-08-11 DIAGNOSIS — R44.2 GUSTATORY HALLUCINATIONS: Chronic | ICD-10-CM

## 2025-08-11 RX ORDER — LEVETIRACETAM 500 MG/1
500 TABLET ORAL 2 TIMES DAILY
Qty: 60 TABLET | Refills: 0 | Status: SHIPPED | OUTPATIENT
Start: 2025-08-11

## 2025-08-28 ENCOUNTER — OFFICE VISIT (OUTPATIENT)
Dept: NEUROLOGY | Facility: CLINIC | Age: 55
End: 2025-08-28
Payer: COMMERCIAL

## 2025-08-28 VITALS
BODY MASS INDEX: 35.39 KG/M2 | SYSTOLIC BLOOD PRESSURE: 110 MMHG | WEIGHT: 267 LBS | OXYGEN SATURATION: 98 % | HEIGHT: 73 IN | HEART RATE: 84 BPM | DIASTOLIC BLOOD PRESSURE: 72 MMHG

## 2025-08-28 DIAGNOSIS — R44.2 GUSTATORY HALLUCINATIONS: Chronic | ICD-10-CM

## 2025-08-28 DIAGNOSIS — G40.109 TEMPORAL LOBE EPILEPSY: Primary | Chronic | ICD-10-CM

## 2025-08-28 PROCEDURE — 99214 OFFICE O/P EST MOD 30 MIN: CPT | Performed by: PSYCHIATRY & NEUROLOGY

## 2025-08-28 RX ORDER — LEVETIRACETAM 500 MG/1
500 TABLET ORAL 2 TIMES DAILY
Qty: 60 TABLET | Refills: 11 | Status: SHIPPED | OUTPATIENT
Start: 2025-08-28

## (undated) DEVICE — CANN O2 ETCO2 FITS ALL CONN CO2 SMPL A/ 7IN DISP LF

## (undated) DEVICE — LN SMPL CO2 SHTRM SD STREAM W/M LUER

## (undated) DEVICE — SENSR O2 OXIMAX FNGR A/ 18IN NONSTR

## (undated) DEVICE — TUBING, SUCTION, 1/4" X 10', STRAIGHT: Brand: MEDLINE

## (undated) DEVICE — ADAPT CLN BIOGUARD AIR/H2O DISP

## (undated) DEVICE — KT ORCA ORCAPOD DISP STRL